# Patient Record
Sex: FEMALE | Race: BLACK OR AFRICAN AMERICAN | Employment: OTHER | URBAN - METROPOLITAN AREA
[De-identification: names, ages, dates, MRNs, and addresses within clinical notes are randomized per-mention and may not be internally consistent; named-entity substitution may affect disease eponyms.]

---

## 2017-01-17 ENCOUNTER — TELEPHONE (OUTPATIENT)
Dept: DERMATOLOGY | Age: 62
End: 2017-01-17

## 2017-01-23 ENCOUNTER — OFFICE VISIT (OUTPATIENT)
Dept: PRIMARY CARE CLINIC | Age: 62
End: 2017-01-23

## 2017-01-23 VITALS
SYSTOLIC BLOOD PRESSURE: 160 MMHG | OXYGEN SATURATION: 96 % | HEART RATE: 67 BPM | TEMPERATURE: 98 F | DIASTOLIC BLOOD PRESSURE: 80 MMHG | WEIGHT: 254.6 LBS | BODY MASS INDEX: 42.37 KG/M2

## 2017-01-23 DIAGNOSIS — K13.79 MOUTH PAIN: Primary | ICD-10-CM

## 2017-01-23 PROCEDURE — 99214 OFFICE O/P EST MOD 30 MIN: CPT | Performed by: FAMILY MEDICINE

## 2017-01-23 RX ORDER — CLINDAMYCIN HYDROCHLORIDE 150 MG/1
150 CAPSULE ORAL 4 TIMES DAILY
Qty: 40 CAPSULE | Refills: 0 | Status: SHIPPED | OUTPATIENT
Start: 2017-01-23 | End: 2017-02-02

## 2017-01-23 ASSESSMENT — ENCOUNTER SYMPTOMS
VOMITING: 0
SORE THROAT: 0
EYE PAIN: 0
BLOOD IN STOOL: 0
TROUBLE SWALLOWING: 0
SINUS PRESSURE: 0
CONSTIPATION: 0
RECTAL PAIN: 0
SHORTNESS OF BREATH: 0
PHOTOPHOBIA: 0
WHEEZING: 0
EYE REDNESS: 0
APNEA: 0
CHOKING: 0
EYE ITCHING: 0
DIARRHEA: 0
STRIDOR: 0
EYE DISCHARGE: 0
ABDOMINAL DISTENTION: 0
COLOR CHANGE: 0
COUGH: 0
CHEST TIGHTNESS: 0
RHINORRHEA: 0
BACK PAIN: 0
NAUSEA: 0

## 2017-01-28 RX ORDER — CANAGLIFLOZIN AND METFORMIN HYDROCHLORIDE 150; 500 MG/1; MG/1
TABLET, FILM COATED ORAL
Qty: 60 TABLET | Refills: 0 | Status: SHIPPED | OUTPATIENT
Start: 2017-01-28 | End: 2017-04-03 | Stop reason: SDUPTHER

## 2017-01-30 DIAGNOSIS — I10 ESSENTIAL HYPERTENSION: ICD-10-CM

## 2017-01-30 RX ORDER — VALSARTAN AND HYDROCHLOROTHIAZIDE 320; 25 MG/1; MG/1
TABLET, FILM COATED ORAL
Qty: 90 TABLET | Refills: 0 | Status: SHIPPED | OUTPATIENT
Start: 2017-01-30 | End: 2017-03-29 | Stop reason: SDUPTHER

## 2017-02-09 ENCOUNTER — OFFICE VISIT (OUTPATIENT)
Dept: DERMATOLOGY | Age: 62
End: 2017-02-09

## 2017-02-09 DIAGNOSIS — L71.9 ROSACEA: Primary | ICD-10-CM

## 2017-02-09 DIAGNOSIS — L30.0 NUMMULAR DERMATITIS: ICD-10-CM

## 2017-02-09 DIAGNOSIS — L82.1 DERMATOSIS PAPULOSA NIGRA: ICD-10-CM

## 2017-02-09 PROCEDURE — 99213 OFFICE O/P EST LOW 20 MIN: CPT | Performed by: DERMATOLOGY

## 2017-02-09 RX ORDER — MINOCYCLINE HYDROCHLORIDE 50 MG/1
CAPSULE ORAL
Qty: 60 CAPSULE | Refills: 2 | Status: SHIPPED | OUTPATIENT
Start: 2017-02-09 | End: 2018-05-03 | Stop reason: SDUPTHER

## 2017-02-09 RX ORDER — TACROLIMUS 1 MG/G
OINTMENT TOPICAL
Qty: 30 G | Refills: 3 | Status: SHIPPED | OUTPATIENT
Start: 2017-02-09 | End: 2019-07-23 | Stop reason: SDUPTHER

## 2017-02-21 RX ORDER — PANTOPRAZOLE SODIUM 40 MG/1
TABLET, DELAYED RELEASE ORAL
Qty: 90 TABLET | Refills: 0 | Status: SHIPPED | OUTPATIENT
Start: 2017-02-21 | End: 2017-06-10 | Stop reason: SDUPTHER

## 2017-03-06 RX ORDER — ATORVASTATIN CALCIUM 20 MG/1
TABLET, FILM COATED ORAL
Qty: 90 TABLET | Refills: 2 | Status: SHIPPED | OUTPATIENT
Start: 2017-03-06 | End: 2017-12-28 | Stop reason: SDUPTHER

## 2017-03-23 ENCOUNTER — OFFICE VISIT (OUTPATIENT)
Dept: PRIMARY CARE CLINIC | Age: 62
End: 2017-03-23

## 2017-03-23 VITALS
TEMPERATURE: 99.2 F | RESPIRATION RATE: 17 BRPM | HEART RATE: 67 BPM | SYSTOLIC BLOOD PRESSURE: 114 MMHG | DIASTOLIC BLOOD PRESSURE: 75 MMHG | WEIGHT: 259 LBS | HEIGHT: 64 IN | BODY MASS INDEX: 44.22 KG/M2

## 2017-03-23 DIAGNOSIS — J06.9 VIRAL URI WITH COUGH: Primary | ICD-10-CM

## 2017-03-23 PROCEDURE — 99213 OFFICE O/P EST LOW 20 MIN: CPT | Performed by: FAMILY MEDICINE

## 2017-03-23 ASSESSMENT — PATIENT HEALTH QUESTIONNAIRE - PHQ9
SUM OF ALL RESPONSES TO PHQ QUESTIONS 1-9: 0
1. LITTLE INTEREST OR PLEASURE IN DOING THINGS: 0
2. FEELING DOWN, DEPRESSED OR HOPELESS: 0
SUM OF ALL RESPONSES TO PHQ9 QUESTIONS 1 & 2: 0

## 2017-03-24 ENCOUNTER — TELEPHONE (OUTPATIENT)
Dept: PRIMARY CARE CLINIC | Age: 62
End: 2017-03-24

## 2017-03-27 ENCOUNTER — TELEPHONE (OUTPATIENT)
Dept: PRIMARY CARE CLINIC | Age: 62
End: 2017-03-27

## 2017-03-27 RX ORDER — AZITHROMYCIN 250 MG/1
TABLET, FILM COATED ORAL
Qty: 1 PACKET | Refills: 0 | Status: SHIPPED | OUTPATIENT
Start: 2017-03-27 | End: 2017-04-06

## 2017-04-05 RX ORDER — CALCIUM CITRATE/VITAMIN D3 200MG-6.25
TABLET ORAL
Qty: 100 STRIP | Refills: 0 | Status: SHIPPED | OUTPATIENT
Start: 2017-04-05 | End: 2017-05-11 | Stop reason: SDUPTHER

## 2017-06-22 ENCOUNTER — TELEPHONE (OUTPATIENT)
Dept: DERMATOLOGY | Age: 62
End: 2017-06-22

## 2017-06-23 RX ORDER — FLUOCINONIDE 0.5 MG/G
OINTMENT TOPICAL
Qty: 60 G | Refills: 1 | Status: SHIPPED | OUTPATIENT
Start: 2017-06-23 | End: 2017-06-30

## 2017-09-19 ENCOUNTER — TELEPHONE (OUTPATIENT)
Dept: PRIMARY CARE CLINIC | Age: 62
End: 2017-09-19

## 2017-09-27 ENCOUNTER — OFFICE VISIT (OUTPATIENT)
Dept: PRIMARY CARE CLINIC | Age: 62
End: 2017-09-27

## 2017-09-27 VITALS
OXYGEN SATURATION: 97 % | TEMPERATURE: 98.2 F | DIASTOLIC BLOOD PRESSURE: 70 MMHG | WEIGHT: 249 LBS | HEART RATE: 60 BPM | BODY MASS INDEX: 44.12 KG/M2 | HEIGHT: 63 IN | SYSTOLIC BLOOD PRESSURE: 110 MMHG

## 2017-09-27 DIAGNOSIS — Z96.643 S/P BILATERAL HIP REPLACEMENTS: ICD-10-CM

## 2017-09-27 DIAGNOSIS — E78.00 PURE HYPERCHOLESTEROLEMIA: ICD-10-CM

## 2017-09-27 DIAGNOSIS — E11.9 TYPE 2 DIABETES MELLITUS WITHOUT COMPLICATION, WITHOUT LONG-TERM CURRENT USE OF INSULIN (HCC): Primary | ICD-10-CM

## 2017-09-27 DIAGNOSIS — I10 ESSENTIAL HYPERTENSION: ICD-10-CM

## 2017-09-27 LAB — HBA1C MFR BLD: 8 %

## 2017-09-27 PROCEDURE — 90736 HZV VACCINE LIVE SUBQ: CPT | Performed by: FAMILY MEDICINE

## 2017-09-27 PROCEDURE — 90471 IMMUNIZATION ADMIN: CPT | Performed by: FAMILY MEDICINE

## 2017-09-27 PROCEDURE — 99214 OFFICE O/P EST MOD 30 MIN: CPT | Performed by: FAMILY MEDICINE

## 2017-09-27 PROCEDURE — 83036 HEMOGLOBIN GLYCOSYLATED A1C: CPT | Performed by: FAMILY MEDICINE

## 2017-09-27 RX ORDER — GLIMEPIRIDE 2 MG/1
2 TABLET ORAL EVERY MORNING
Qty: 90 TABLET | Refills: 3 | Status: SHIPPED | OUTPATIENT
Start: 2017-09-27 | End: 2017-09-27 | Stop reason: DRUGHIGH

## 2017-09-27 RX ORDER — GLIMEPIRIDE 4 MG/1
TABLET ORAL
Qty: 90 TABLET | Refills: 3 | Status: SHIPPED | OUTPATIENT
Start: 2017-09-27 | End: 2018-09-25 | Stop reason: SDUPTHER

## 2017-09-27 ASSESSMENT — ENCOUNTER SYMPTOMS
APNEA: 0
BACK PAIN: 0
PHOTOPHOBIA: 0
CONSTIPATION: 0
VOMITING: 0
CHOKING: 0
COLOR CHANGE: 0
RECTAL PAIN: 0
BLURRED VISION: 0
BLOOD IN STOOL: 0
EYE PAIN: 0
STRIDOR: 0
NAUSEA: 0
WHEEZING: 0
ORTHOPNEA: 0
RHINORRHEA: 0
SINUS PRESSURE: 0
DIARRHEA: 0
TROUBLE SWALLOWING: 0
EYE REDNESS: 0
VISUAL CHANGE: 0
EYE ITCHING: 0
CHEST TIGHTNESS: 0
SHORTNESS OF BREATH: 0
ABDOMINAL DISTENTION: 0
SORE THROAT: 0
EYE DISCHARGE: 0
COUGH: 0

## 2017-10-02 ENCOUNTER — TELEPHONE (OUTPATIENT)
Dept: PRIMARY CARE CLINIC | Age: 62
End: 2017-10-02

## 2017-10-02 NOTE — TELEPHONE ENCOUNTER
Pt was given the pneumonia shot last week. The injection site appears to have a rash on it. Some clear fluid. She wants to know what you recommend. Pls advise. Thank you!    PT JESUS: [phone]

## 2017-10-04 RX ORDER — MOMETASONE FUROATE 50 UG/1
SPRAY, METERED NASAL
Qty: 34 G | Refills: 5 | Status: SHIPPED | OUTPATIENT
Start: 2017-10-04

## 2017-10-04 RX ORDER — CLONIDINE HYDROCHLORIDE 0.2 MG/1
TABLET ORAL
Qty: 180 TABLET | Refills: 1 | Status: SHIPPED | OUTPATIENT
Start: 2017-10-04 | End: 2018-06-13 | Stop reason: SDUPTHER

## 2017-10-05 NOTE — TELEPHONE ENCOUNTER
Left message on voicemail for patient to call the office back.   To check on status of possible vaccine reaction

## 2017-10-06 ENCOUNTER — TELEPHONE (OUTPATIENT)
Dept: PRIMARY CARE CLINIC | Age: 62
End: 2017-10-06

## 2017-11-10 ENCOUNTER — OFFICE VISIT (OUTPATIENT)
Dept: PRIMARY CARE CLINIC | Age: 62
End: 2017-11-10

## 2017-11-10 VITALS
BODY MASS INDEX: 42.85 KG/M2 | SYSTOLIC BLOOD PRESSURE: 116 MMHG | WEIGHT: 251 LBS | HEART RATE: 60 BPM | DIASTOLIC BLOOD PRESSURE: 74 MMHG | HEIGHT: 64 IN

## 2017-11-10 DIAGNOSIS — Z78.0 POSTMENOPAUSE: ICD-10-CM

## 2017-11-10 DIAGNOSIS — Z00.00 ANNUAL PHYSICAL EXAM: Primary | ICD-10-CM

## 2017-11-10 DIAGNOSIS — E55.9 VITAMIN D DEFICIENCY: ICD-10-CM

## 2017-11-10 PROCEDURE — 99396 PREV VISIT EST AGE 40-64: CPT | Performed by: FAMILY MEDICINE

## 2017-11-10 NOTE — PROGRESS NOTES
Administered    Influenza, Intradermal, Preservative free 12/28/2012, 10/28/2016    Pneumococcal Polysaccharide ([id number]) 11/11/2016    Tdap (Boostrix, Adacel) 06/06/2016    Zoster 09/27/2017       Allergies   Allergen Reactions    Codeine Itching    Penicillins Itching     Current Outpatient Prescriptions   Medication Sig Dispense Refill    cloNIDine (CATAPRES) 0.2 MG tablet TAKE 1 TABLET BY MOUTH TWICE DAILY 180 tablet 1    mometasone (NASONEX) 50 MCG/ACT nasal spray SHAKE WELL AND USE 2 SPRAYS NASALLY DAILY 34 g 5    glimepiride (AMARYL) 4 MG tablet Take one tab a day and stop amaryl 2 mg 90 tablet 3    metoprolol tartrate (LOPRESSOR) 25 MG tablet TAKE 1 TABLET BY MOUTH TWICE DAILY 180 tablet 3    pantoprazole (PROTONIX) 40 MG tablet TAKE 1 TABLET BY MOUTH DAILY 90 tablet 2    amLODIPine (NORVASC) 5 MG tablet TAKE 1 TABLET BY MOUTH DAILY 90 tablet 2    INVOKAMET 150-500 MG TAKE 1 TABLET BY MOUTH TWICE DAILY WITH MEALS 60 tablet 5    valsartan-hydrochlorothiazide (DIOVAN-HCT) 320-25 MG per tablet TAKE 1 TABLET BY MOUTH DAILY 90 tablet 5    atorvastatin (LIPITOR) 20 MG tablet TAKE 1 TABLET BY MOUTH DAILY 90 tablet 2    minocycline (MINOCIN;DYNACIN) 50 MG capsule Take 1 by mouth twice daily for 2 to 4 weeks for rosacea flare ups. 60 capsule 2    tacrolimus (PROTOPIC) 0.1 % ointment Apply topically to the face 2 times daily if needed for rosacea.  30 g 3    Blood Glucose Monitoring Suppl BROCK Dispense one glucose monitor with supplies(any monitor covered by insurance  AIC 8.2  Test glucose twice a day 1 Device 0    Glucose Blood (BLOOD GLUCOSE TEST STRIPS) STRP Dispense glucose strips covered by insurance and compatible with her glucose monitor  AIC 8.2  Test glucose twice a day 200 strip 5    Lancets MISC Dispense any lancets covered by insurance  AIC 8.2  Test glucose twice a day 100 each 5    ibuprofen (ADVIL;MOTRIN) 800 MG tablet One tablet TID prn pain 120 tablet 3    clobetasol (TEMOVATE) 0.05 % ointment Apply to the itchy areas twice daily for 2 weeks or until improved. 30 g 1    ONETOUCH DELICA LANCETS 03M MISC USE TO TEST GLUCOSE TWICE DAILY 300 each 3    naproxen (NAPROSYN) 500 MG tablet Take 1 tablet by mouth 2 times daily as needed for Pain 30 tablet 0    fexofenadine (ALLEGRA) 180 MG tablet Take 1 tablet by mouth daily. 30 tablet 3    albuterol (PROVENTIL HFA) 108 (90 BASE) MCG/ACT inhaler Inhale 2 puffs into the lungs every 6 hours as needed for Wheezing for 365 doses. 1 Inhaler 3    aspirin 325 MG EC tablet Take 325 mg by mouth daily.  TRUE METRIX BLOOD GLUCOSE TEST strip FOLLOW PACKAGE DIRECTIONS 100 strip 5     No current facility-administered medications for this visit. Past Medical History:   Diagnosis Date    Diabetes mellitus ([address]     GERD (gastroesophageal reflux disease)     Hyperlipidemia     Hypertension     Obesity     S/P hip replacement      Past Surgical History:   Procedure Laterality Date    JOINT REPLACEMENT      TOTAL HIP ARTHROPLASTY Left Oct '13     Family History   Problem Relation Age of Onset    Heart Disease Mother      Social History     Social History    Marital status:      Spouse name: N/A    Number of children: N/A    Years of education: N/A     Occupational History    Not on file. Social History Main Topics    Smoking status: Never Smoker    Smokeless tobacco: Never Used    Alcohol use No    Drug use: No    Sexual activity: Not on file     Other Topics Concern    Not on file     Social History Narrative    No narrative on file       Review of Systems:  A comprehensive review of systems was negative except for what was noted in the HPI. Physical Exam:   Vitals:    11/10/17 1647   BP: 116/74   Pulse: 60   Weight: 251 lb (113.9 kg)   Height: 5' 4\" (1.626 m)     Body mass index is 43.08 kg/m². Constitutional: She is oriented to person, place, and time. She appears well-developed and well-nourished.

## 2017-12-18 ENCOUNTER — TELEPHONE (OUTPATIENT)
Dept: PRIMARY CARE CLINIC | Age: 62
End: 2017-12-18

## 2017-12-18 RX ORDER — ALBUTEROL SULFATE 90 UG/1
2 AEROSOL, METERED RESPIRATORY (INHALATION) EVERY 6 HOURS PRN
Qty: 1 INHALER | Refills: 3 | Status: SHIPPED | OUTPATIENT
Start: 2017-12-18 | End: 2021-04-19 | Stop reason: SDUPTHER

## 2017-12-28 RX ORDER — ATORVASTATIN CALCIUM 20 MG/1
TABLET, FILM COATED ORAL
Qty: 90 TABLET | Refills: 0 | Status: SHIPPED | OUTPATIENT
Start: 2017-12-28 | End: 2018-04-04 | Stop reason: SDUPTHER

## 2018-01-31 ENCOUNTER — OFFICE VISIT (OUTPATIENT)
Dept: PRIMARY CARE CLINIC | Age: 63
End: 2018-01-31

## 2018-01-31 VITALS — DIASTOLIC BLOOD PRESSURE: 80 MMHG | SYSTOLIC BLOOD PRESSURE: 120 MMHG

## 2018-01-31 DIAGNOSIS — R07.9 CHEST PAIN, UNSPECIFIED TYPE: ICD-10-CM

## 2018-01-31 DIAGNOSIS — Z82.49 FAMILY HISTORY OF HEART DISEASE: ICD-10-CM

## 2018-01-31 DIAGNOSIS — M17.0 PRIMARY OSTEOARTHRITIS OF BOTH KNEES: ICD-10-CM

## 2018-01-31 DIAGNOSIS — M16.0 BILATERAL HIP JOINT ARTHRITIS: ICD-10-CM

## 2018-01-31 DIAGNOSIS — R06.02 SOB (SHORTNESS OF BREATH): Primary | ICD-10-CM

## 2018-01-31 PROCEDURE — 99214 OFFICE O/P EST MOD 30 MIN: CPT | Performed by: FAMILY MEDICINE

## 2018-01-31 ASSESSMENT — ENCOUNTER SYMPTOMS
WHEEZING: 0
STRIDOR: 0
CONSTIPATION: 0
BACK PAIN: 0
NAUSEA: 0
CHOKING: 0
ABDOMINAL DISTENTION: 0
SHORTNESS OF BREATH: 1
CHEST TIGHTNESS: 0
VOMITING: 0
SINUS PRESSURE: 0
APNEA: 0
TROUBLE SWALLOWING: 0
COLOR CHANGE: 0
COUGH: 0
DIARRHEA: 0
EYE REDNESS: 0
RHINORRHEA: 0
EYE PAIN: 0
EYE ITCHING: 0
BLOOD IN STOOL: 0
SORE THROAT: 0
EYE DISCHARGE: 0
PHOTOPHOBIA: 0
RECTAL PAIN: 0

## 2018-01-31 NOTE — PROGRESS NOTES
is no rebound and no guarding. Musculoskeletal: Normal range of motion. She exhibits no edema or tenderness. Lymphadenopathy:     She has no cervical adenopathy. Neurological: She is alert and oriented to person, place, and time. She has normal reflexes. No cranial nerve deficit. Coordination normal.   Skin: Skin is warm and dry. No rash noted. She is not diaphoretic. No erythema. No pallor. Psychiatric: She has a normal mood and affect. Her behavior is normal. Judgment and thought content normal.   Nursing note and vitals reviewed. Assessment:      1. SOB (shortness of breath)    - Stress test, myoview; Future    2. Chest pain, unspecified type  Restart asa    - Stress test, myoview; Future  Should report to ER stat  If chest pain persists or worsens  3. Family history of heart disease    - Stress test, myoview; Future    4. Primary osteoarthritis of both knees    - Stress test, myoview; Future    5. Bilateral hip joint arthritis    - Stress test, myoview;  Future          Plan:

## 2018-02-05 ENCOUNTER — TELEPHONE (OUTPATIENT)
Dept: PRIMARY CARE CLINIC | Age: 63
End: 2018-02-05

## 2018-02-05 ENCOUNTER — HOSPITAL ENCOUNTER (OUTPATIENT)
Dept: NON INVASIVE DIAGNOSTICS | Age: 63
Discharge: HOME | End: 2018-02-05
Attending: FAMILY MEDICINE | Admitting: FAMILY MEDICINE

## 2018-02-05 DIAGNOSIS — R06.02 SHORTNESS OF BREATH: ICD-10-CM

## 2018-02-05 DIAGNOSIS — R94.39 ABNORMAL STRESS TEST: ICD-10-CM

## 2018-02-05 DIAGNOSIS — R07.9 CHEST PAIN, UNSPECIFIED TYPE: Primary | ICD-10-CM

## 2018-02-05 LAB
LV EF: 74 %
LVEF MODALITY: NORMAL

## 2018-02-06 ENCOUNTER — TELEPHONE (OUTPATIENT)
Dept: PRIMARY CARE CLINIC | Age: 63
End: 2018-02-06

## 2018-02-06 NOTE — TELEPHONE ENCOUNTER
Patient states she needs a letter for work. She states she has been off since 02/01/2018 and does not see the cardiologist until Friday. She would like the letter to stay open.   Please advise

## 2018-02-09 ENCOUNTER — OFFICE VISIT (OUTPATIENT)
Dept: CARDIOLOGY CLINIC | Age: 63
End: 2018-02-09

## 2018-02-09 VITALS
DIASTOLIC BLOOD PRESSURE: 58 MMHG | HEART RATE: 72 BPM | HEIGHT: 63 IN | SYSTOLIC BLOOD PRESSURE: 104 MMHG | BODY MASS INDEX: 45 KG/M2 | WEIGHT: 254 LBS

## 2018-02-09 DIAGNOSIS — Z79.01 LONG-TERM (CURRENT) USE OF ANTICOAGULANTS: ICD-10-CM

## 2018-02-09 DIAGNOSIS — R94.31 ABNORMAL EKG: Primary | ICD-10-CM

## 2018-02-09 DIAGNOSIS — I10 ESSENTIAL HYPERTENSION: ICD-10-CM

## 2018-02-09 DIAGNOSIS — E78.5 HYPERLIPIDEMIA, UNSPECIFIED HYPERLIPIDEMIA TYPE: ICD-10-CM

## 2018-02-09 LAB
A/G RATIO: 1.3 (ref 1.1–2.2)
ALBUMIN SERPL-MCNC: 4.2 G/DL (ref 3.4–5)
ALP BLD-CCNC: 71 U/L (ref 40–129)
ALT SERPL-CCNC: 18 U/L (ref 10–40)
ANION GAP SERPL CALCULATED.3IONS-SCNC: 14 MMOL/L (ref 3–16)
AST SERPL-CCNC: 23 U/L (ref 15–37)
BASOPHILS ABSOLUTE: 0.1 K/UL (ref 0–0.2)
BASOPHILS RELATIVE PERCENT: 0.7 %
BILIRUB SERPL-MCNC: 0.9 MG/DL (ref 0–1)
BUN BLDV-MCNC: 21 MG/DL (ref 7–20)
CALCIUM SERPL-MCNC: 9.8 MG/DL (ref 8.3–10.6)
CHLORIDE BLD-SCNC: 97 MMOL/L (ref 99–110)
CO2: 31 MMOL/L (ref 21–32)
CREAT SERPL-MCNC: 1.3 MG/DL (ref 0.6–1.2)
EOSINOPHILS ABSOLUTE: 0.3 K/UL (ref 0–0.6)
EOSINOPHILS RELATIVE PERCENT: 4.5 %
GFR AFRICAN AMERICAN: 50
GFR NON-AFRICAN AMERICAN: 41
GLOBULIN: 3.3 G/DL
GLUCOSE BLD-MCNC: 127 MG/DL (ref 70–99)
HCT VFR BLD CALC: 41.8 % (ref 36–48)
HEMOGLOBIN: 13.6 G/DL (ref 12–16)
INR BLD: 0.98 (ref 0.85–1.15)
LYMPHOCYTES ABSOLUTE: 1.4 K/UL (ref 1–5.1)
LYMPHOCYTES RELATIVE PERCENT: 20.4 %
MCH RBC QN AUTO: 29.4 PG (ref 26–34)
MCHC RBC AUTO-ENTMCNC: 32.7 G/DL (ref 31–36)
MCV RBC AUTO: 90 FL (ref 80–100)
MONOCYTES ABSOLUTE: 0.7 K/UL (ref 0–1.3)
MONOCYTES RELATIVE PERCENT: 9.9 %
NEUTROPHILS ABSOLUTE: 4.4 K/UL (ref 1.7–7.7)
NEUTROPHILS RELATIVE PERCENT: 64.5 %
PDW BLD-RTO: 15.5 % (ref 12.4–15.4)
PLATELET # BLD: 228 K/UL (ref 135–450)
PMV BLD AUTO: 10.4 FL (ref 5–10.5)
POTASSIUM SERPL-SCNC: 4.5 MMOL/L (ref 3.5–5.1)
PROTHROMBIN TIME: 11.1 SEC (ref 9.6–13)
RBC # BLD: 4.64 M/UL (ref 4–5.2)
SODIUM BLD-SCNC: 142 MMOL/L (ref 136–145)
TOTAL PROTEIN: 7.5 G/DL (ref 6.4–8.2)
WBC # BLD: 6.8 K/UL (ref 4–11)

## 2018-02-09 PROCEDURE — 99204 OFFICE O/P NEW MOD 45 MIN: CPT | Performed by: INTERNAL MEDICINE

## 2018-02-09 NOTE — PROGRESS NOTES
MD Maria De Jesus   clobetasol (TEMOVATE) 0.05 % ointment Apply to the itchy areas twice daily for 2 weeks or until improved. 3/30/16  Yes Tommie Denney MD   [facility] LANCETS 01Y MISC USE TO TEST GLUCOSE TWICE DAILY 6/23/15  Yes Gerard Contreras MD   naproxen (NAPROSYN) 500 MG tablet Take 1 tablet by mouth 2 times daily as needed for Pain 6/3/15  Yes Gerard Contreras MD   fexofenadine (ALLEGRA) 180 MG tablet Take 1 tablet by mouth daily. 10/6/14  Yes Gerard Contreras MD   aspirin 325 MG EC tablet Take 325 mg by mouth daily. Yes Historical Provider, MD           Allergies:  Codeine and Penicillins     Review of Systems:   · Constitutional: there has been no unanticipated weight loss. · Eyes: No visual changes or diplopia. No scleral icterus. · ENT: No Headaches, hearing loss or vertigo. No mouth sores or sore throat. · Cardiovascular: Reviewed in HPI  ·  Pulmonary:  no cough or sputum production. · Gastrointestinal: No abdominal pain, appetite loss, blood in stools. No change in bowel or bladder habits. · Genitourinary: No dysuria, trouble voiding, or hematuria. · Musculoskeletal:  No gait disturbance, weakness or joint complaints. · Integumentary: No rash or pruritis. Endocrine: No malaise, fatigue or temperature intolerance. Hematologic/Lymphatic: No abnormal bruising or bleeding, blood clots or swollen lymph nodes. · Allergic/Immunologic: No nasal congestion or hives. · All other ROS are reviewed and are unremarkable. Physical Examination:      Wt Readings from Last 3 Encounters:   02/09/18 254 lb (115.2 kg)   01/31/18 255 lb (115.7 kg)   01/30/18 255 lb (115.7 kg)       BP Readings from Last 3 Encounters:   02/09/18 (!) 104/58   01/31/18 120/80   01/31/18 126/76       Pulse Readings from Last 3 Encounters:   02/09/18 72   01/31/18 61   01/30/18 63       Constitutional and General Appearance:  Healthy. And alert . HEENT: eyes and ears intact.  No nasal masses  THYROID: not enlarged  LUNGS:  · Clear to auscultation and percussion  HEART and VASCULAR:  · The apical impulses not displaced  · Heart tones are crisp and normal  · Cervical veins are not engorged  · The carotid upstroke is normal in amplitude and contour without delay or bruit  · Peripheral pulses are symmetrical and full  · There is no clubbing, cyanosis of the extremities. · No peripheral edema  · Femoral Arteries: 2+ and equal  · Pedal Pulses: 2+ and equal   ABDOMEN[de-identified]  · No masses or tenderness  · Liver/Spleen: No Abnormalities Noted  NEUROLOGICAL:  . Moves all extremities to command. Cranial nerves 2-12 are in tact.   PSYCHIATRIC: alert and lucid  and oriented and appropriate  SKIN: No lesions or rashes  LYMPH NODES: none enlarged    ·   ·   ·     CBC with Differential:    Lab Results   Component Value Date    WBC 8.0 01/30/2018    RBC 4.51 01/30/2018    HGB 13.1 01/30/2018    HCT 40.5 01/30/2018     01/30/2018    MCV 89.8 01/30/2018    MCH 29.1 01/30/2018    MCHC 32.4 01/30/2018    RDW 15.7 01/30/2018    SEGSPCT 66 08/15/2012    LYMPHOPCT 19.3 01/30/2018    MONOPCT 11.5 01/30/2018    EOSPCT 4.3 02/16/2012    BASOPCT 0.9 01/30/2018    MONOSABS 0.9 01/30/2018    LYMPHSABS 1.5 01/30/2018    EOSABS 0.4 01/30/2018    BASOSABS 0.1 01/30/2018    DIFFTYPE Auto-K 02/16/2012     BMP:    Lab Results   Component Value Date     01/30/2018    K 3.8 01/30/2018     01/30/2018    CO2 32 01/30/2018    BUN 27 01/30/2018    LABALBU 4.3 06/03/2015    CREATININE 1.4 01/30/2018    CALCIUM 9.7 01/30/2018    GFRAA 46 01/30/2018    GFRAA >60 12/28/2012    LABGLOM 38 01/30/2018    LABGLOM 65 09/12/2012    LABGLOM 54 09/12/2012     Uric Acid:  No components found for: URIC  PT/INR:    Lab Results   Component Value Date    PROTIME 12.1 10/22/2013    INR 1.08 10/22/2013     Last 3 Troponin:    Lab Results   Component Value Date    TROPONINI <0.01 01/30/2018     FLP:    Lab Results   Component Value Date    TRIG 88 05/27/2016    HDL

## 2018-02-13 ENCOUNTER — HOSPITAL ENCOUNTER (OUTPATIENT)
Dept: CARDIAC CATH/INVASIVE PROCEDURES | Age: 63
Discharge: HOME | End: 2018-02-13
Attending: INTERNAL MEDICINE | Admitting: INTERNAL MEDICINE

## 2018-02-13 VITALS — HEIGHT: 63 IN | WEIGHT: 254 LBS | BODY MASS INDEX: 45 KG/M2

## 2018-02-13 LAB
ANION GAP SERPL CALCULATED.3IONS-SCNC: 11 MMOL/L (ref 3–16)
BUN BLDV-MCNC: 27 MG/DL (ref 7–20)
CALCIUM SERPL-MCNC: 9.5 MG/DL (ref 8.3–10.6)
CHLORIDE BLD-SCNC: 100 MMOL/L (ref 99–110)
CO2: 30 MMOL/L (ref 21–32)
CREAT SERPL-MCNC: 1.3 MG/DL (ref 0.6–1.2)
GFR AFRICAN AMERICAN: 50
GFR NON-AFRICAN AMERICAN: 41
GLUCOSE BLD-MCNC: 123 MG/DL (ref 70–99)
GLUCOSE BLD-MCNC: 92 MG/DL (ref 70–99)
PERFORMED ON: NORMAL
POTASSIUM SERPL-SCNC: 4.2 MMOL/L (ref 3.5–5.1)
SODIUM BLD-SCNC: 141 MMOL/L (ref 136–145)

## 2018-02-13 PROCEDURE — 99024 POSTOP FOLLOW-UP VISIT: CPT | Performed by: INTERNAL MEDICINE

## 2018-02-13 PROCEDURE — 93452 LEFT HRT CATH W/VENTRCLGRPHY: CPT | Performed by: INTERNAL MEDICINE

## 2018-02-13 RX ORDER — ONDANSETRON 2 MG/ML
4 INJECTION INTRAMUSCULAR; INTRAVENOUS EVERY 6 HOURS PRN
Status: DISCONTINUED | OUTPATIENT
Start: 2018-02-13 | End: 2018-02-14 | Stop reason: HOSPADM

## 2018-02-13 RX ORDER — ACETAMINOPHEN 325 MG/1
650 TABLET ORAL EVERY 4 HOURS PRN
Status: DISCONTINUED | OUTPATIENT
Start: 2018-02-13 | End: 2018-02-14 | Stop reason: HOSPADM

## 2018-02-13 RX ORDER — SODIUM CHLORIDE 9 MG/ML
INJECTION, SOLUTION INTRAVENOUS CONTINUOUS
Status: ACTIVE | OUTPATIENT
Start: 2018-02-13 | End: 2018-02-13

## 2018-02-14 ENCOUNTER — TELEPHONE (OUTPATIENT)
Dept: DERMATOLOGY | Age: 63
End: 2018-02-14

## 2018-02-14 NOTE — TELEPHONE ENCOUNTER
Patient called and her leg is not getting any better. It is very itcy.      Call back # [phone]

## 2018-02-16 ENCOUNTER — TELEPHONE (OUTPATIENT)
Dept: CARDIOLOGY CLINIC | Age: 63
End: 2018-02-16

## 2018-03-05 ENCOUNTER — OFFICE VISIT (OUTPATIENT)
Dept: CARDIOLOGY CLINIC | Age: 63
End: 2018-03-05

## 2018-03-05 VITALS
BODY MASS INDEX: 46.94 KG/M2 | DIASTOLIC BLOOD PRESSURE: 84 MMHG | WEIGHT: 260.8 LBS | HEART RATE: 68 BPM | SYSTOLIC BLOOD PRESSURE: 130 MMHG

## 2018-03-05 DIAGNOSIS — E78.5 HYPERLIPIDEMIA, UNSPECIFIED HYPERLIPIDEMIA TYPE: Primary | ICD-10-CM

## 2018-03-05 PROCEDURE — 99213 OFFICE O/P EST LOW 20 MIN: CPT | Performed by: INTERNAL MEDICINE

## 2018-03-05 RX ORDER — AMLODIPINE BESYLATE 5 MG/1
TABLET ORAL
Qty: 90 TABLET | Refills: 0 | Status: SHIPPED | OUTPATIENT
Start: 2018-03-05 | End: 2018-06-13 | Stop reason: SDUPTHER

## 2018-03-05 NOTE — PROGRESS NOTES
impulses not displaced  Heart tones are crisp and normal  Cervical veins are not engorged  The carotid upstroke is normal in amplitude and contour without delay or bruit  JVP is not elevated  ABDOMEN:  Normal bowel sounds, non-distended and non-tender to palpation  EXT: No edema, no calf tenderness. Pulses are present bilaterally. DATA:    Lab Results   Component Value Date    ALT 18 02/09/2018    AST 23 02/09/2018    ALKPHOS 71 02/09/2018    BILITOT 0.9 02/09/2018     Lab Results   Component Value Date    CREATININE 1.3 (H) 02/13/2018    BUN 27 (H) 02/13/2018     02/13/2018    K 4.2 02/13/2018     02/13/2018    CO2 30 02/13/2018     Lab Results   Component Value Date    TSH 1.34 12/28/2012     Lab Results   Component Value Date    WBC 6.8 02/09/2018    HGB 13.6 02/09/2018    HCT 41.8 02/09/2018    MCV 90.0 02/09/2018     02/09/2018     No components found for: CHLPL  Lab Results   Component Value Date    TRIG 88 05/27/2016    TRIG 98 06/03/2015    TRIG 109 08/15/2012     Lab Results   Component Value Date    HDL 43 05/27/2016    HDL 44 06/03/2015    HDL 42 08/15/2012     Lab Results   Component Value Date    LDLCALC 97 05/27/2016    LDLCALC 98 06/03/2015    LDLCALC 114 08/15/2012     Lab Results   Component Value Date    LABVLDL 18 05/27/2016    LABVLDL 20 06/03/2015    LABVLDL 21 01/30/2012     Radiology Review:  Pertinent images / reports were reviewed as a part of this visit and reveals the following:    Last Echo:    Last Stress Test / Angiogram:  Results:  Left ventricular pressure 14 mmHg 2/13/18  Aortic pressure 136 mmHg     Coronary anatomy:   The left main coronary artery is normal.      Left anterior descending artery is normal     Circumflex artery is normal.     The right coronary artery is a dominant vessel and normal.      Left ventriculogram shows ejection fraction of 55%. Normal wall motion.   All coronary arteries were large vessels without compromise.     Impression:  No evidence   coronary artery disease  False positiNormal sinus rhythm  1/30/18  Possible Left atrial enlargement   Borderline ECG  stress test  Last ECG:  This patient was educated using the patient point room wall mount device. Absence from smokers and smoking and diet and exercising are important. Assessment:    Chest pains that were atypical with a false positive stress nuclear study. Cardiac cath was negative with normal coronaries. Plan:   Continue current therapy. Follow with Dr. Quinton Villavicencio. We will be happy to see her in the future as necessary. Please call if we can assist further [phone]. Marcus Pang.  Supriya PALM, [facility] [place]      This note was likely completed using voice recognition technology and may contain unintended errors

## 2018-04-04 DIAGNOSIS — K21.9 GASTROESOPHAGEAL REFLUX DISEASE WITHOUT ESOPHAGITIS: Primary | ICD-10-CM

## 2018-04-04 RX ORDER — PANTOPRAZOLE SODIUM 40 MG/1
TABLET, DELAYED RELEASE ORAL
Qty: 90 TABLET | Refills: 0 | Status: SHIPPED | OUTPATIENT
Start: 2018-04-04 | End: 2018-07-19 | Stop reason: SDUPTHER

## 2018-04-30 ENCOUNTER — TELEPHONE (OUTPATIENT)
Dept: DERMATOLOGY | Age: 63
End: 2018-04-30

## 2018-04-30 ENCOUNTER — TELEPHONE (OUTPATIENT)
Dept: PRIMARY CARE CLINIC | Age: 63
End: 2018-04-30

## 2018-05-03 ENCOUNTER — OFFICE VISIT (OUTPATIENT)
Dept: DERMATOLOGY | Age: 63
End: 2018-05-03

## 2018-05-03 DIAGNOSIS — L30.9 CHRONIC DERMATITIS: ICD-10-CM

## 2018-05-03 DIAGNOSIS — L71.9 ROSACEA: Primary | ICD-10-CM

## 2018-05-03 PROCEDURE — 99213 OFFICE O/P EST LOW 20 MIN: CPT | Performed by: DERMATOLOGY

## 2018-05-03 RX ORDER — MINOCYCLINE HYDROCHLORIDE 50 MG/1
CAPSULE ORAL
Qty: 60 CAPSULE | Refills: 2 | Status: SHIPPED | OUTPATIENT
Start: 2018-05-03 | End: 2019-05-11 | Stop reason: SDUPTHER

## 2018-05-03 RX ORDER — CLOBETASOL PROPIONATE 0.5 MG/G
OINTMENT TOPICAL
Qty: 45 G | Refills: 2 | Status: SHIPPED | OUTPATIENT
Start: 2018-05-03

## 2018-05-15 RX ORDER — CANAGLIFLOZIN AND METFORMIN HYDROCHLORIDE 150; 500 MG/1; MG/1
TABLET, FILM COATED ORAL
Qty: 60 TABLET | Refills: 3 | Status: SHIPPED | OUTPATIENT
Start: 2018-05-15 | End: 2018-09-25 | Stop reason: SDUPTHER

## 2018-06-13 DIAGNOSIS — I10 ESSENTIAL HYPERTENSION: ICD-10-CM

## 2018-06-13 RX ORDER — AMLODIPINE BESYLATE 5 MG/1
TABLET ORAL
Qty: 90 TABLET | Refills: 0 | Status: SHIPPED | OUTPATIENT
Start: 2018-06-13 | End: 2018-09-25 | Stop reason: SDUPTHER

## 2018-06-13 RX ORDER — VALSARTAN AND HYDROCHLOROTHIAZIDE 320; 25 MG/1; MG/1
1 TABLET, FILM COATED ORAL DAILY
Qty: 90 TABLET | Refills: 0 | Status: SHIPPED | OUTPATIENT
Start: 2018-06-13 | End: 2018-09-25 | Stop reason: SDUPTHER

## 2018-07-19 DIAGNOSIS — K21.9 GASTROESOPHAGEAL REFLUX DISEASE WITHOUT ESOPHAGITIS: ICD-10-CM

## 2018-07-20 RX ORDER — PANTOPRAZOLE SODIUM 40 MG/1
TABLET, DELAYED RELEASE ORAL
Qty: 90 TABLET | Refills: 0 | Status: SHIPPED | OUTPATIENT
Start: 2018-07-20 | End: 2018-09-25 | Stop reason: SDUPTHER

## 2018-07-20 NOTE — TELEPHONE ENCOUNTER
Requested Prescriptions     Pending Prescriptions Disp Refills    pantoprazole (PROTONIX) 40 MG tablet [Pharmacy Med Name: PANTOPRAZOLE 40MG TABLETS] 90 tablet 0     Sig: TAKE 1 TABLET BY MOUTH DAILY   .   Last ov      2/14/18   Future OV  none

## 2018-08-03 ENCOUNTER — TELEPHONE (OUTPATIENT)
Dept: PRIMARY CARE CLINIC | Age: 63
End: 2018-08-03

## 2018-08-03 DIAGNOSIS — B37.9 YEAST INFECTION: Primary | ICD-10-CM

## 2018-08-03 RX ORDER — FLUCONAZOLE 150 MG/1
150 TABLET ORAL ONCE
Qty: 1 TABLET | Refills: 0 | Status: SHIPPED | OUTPATIENT
Start: 2018-08-03 | End: 2018-08-03

## 2018-09-25 ENCOUNTER — OFFICE VISIT (OUTPATIENT)
Dept: PRIMARY CARE CLINIC | Age: 63
End: 2018-09-25
Payer: COMMERCIAL

## 2018-09-25 VITALS
HEART RATE: 72 BPM | HEIGHT: 64 IN | BODY MASS INDEX: 42.85 KG/M2 | WEIGHT: 251 LBS | SYSTOLIC BLOOD PRESSURE: 128 MMHG | OXYGEN SATURATION: 95 % | TEMPERATURE: 98.4 F | DIASTOLIC BLOOD PRESSURE: 76 MMHG

## 2018-09-25 DIAGNOSIS — Z12.31 ENCOUNTER FOR SCREENING MAMMOGRAM FOR BREAST CANCER: ICD-10-CM

## 2018-09-25 DIAGNOSIS — I10 ESSENTIAL HYPERTENSION: ICD-10-CM

## 2018-09-25 DIAGNOSIS — E11.9 TYPE 2 DIABETES MELLITUS WITHOUT COMPLICATION, WITHOUT LONG-TERM CURRENT USE OF INSULIN (HCC): Primary | ICD-10-CM

## 2018-09-25 DIAGNOSIS — Z23 NEED FOR PROPHYLACTIC VACCINATION AND INOCULATION AGAINST VARICELLA: ICD-10-CM

## 2018-09-25 DIAGNOSIS — K21.9 GASTROESOPHAGEAL REFLUX DISEASE WITHOUT ESOPHAGITIS: ICD-10-CM

## 2018-09-25 DIAGNOSIS — E66.01 MORBID OBESITY (HCC): ICD-10-CM

## 2018-09-25 DIAGNOSIS — Z13.220 SCREENING FOR HYPERLIPIDEMIA: ICD-10-CM

## 2018-09-25 LAB — HBA1C MFR BLD: 7.5 %

## 2018-09-25 PROCEDURE — 83036 HEMOGLOBIN GLYCOSYLATED A1C: CPT | Performed by: FAMILY MEDICINE

## 2018-09-25 PROCEDURE — 99214 OFFICE O/P EST MOD 30 MIN: CPT | Performed by: FAMILY MEDICINE

## 2018-09-25 RX ORDER — PANTOPRAZOLE SODIUM 40 MG/1
TABLET, DELAYED RELEASE ORAL
Qty: 90 TABLET | Refills: 1 | Status: SHIPPED | OUTPATIENT
Start: 2018-09-25 | End: 2018-12-24

## 2018-09-25 RX ORDER — GLIMEPIRIDE 4 MG/1
TABLET ORAL
Qty: 90 TABLET | Refills: 1 | Status: SHIPPED | OUTPATIENT
Start: 2018-09-25 | End: 2018-12-24

## 2018-09-25 RX ORDER — AMLODIPINE BESYLATE 5 MG/1
TABLET ORAL
Qty: 90 TABLET | Refills: 0 | Status: SHIPPED | OUTPATIENT
Start: 2018-09-25 | End: 2018-12-23 | Stop reason: SDUPTHER

## 2018-09-25 RX ORDER — ATORVASTATIN CALCIUM 20 MG/1
TABLET, FILM COATED ORAL
Qty: 90 TABLET | Refills: 1 | Status: SHIPPED | OUTPATIENT
Start: 2018-09-25 | End: 2019-07-05 | Stop reason: SDUPTHER

## 2018-09-25 RX ORDER — VALSARTAN AND HYDROCHLOROTHIAZIDE 320; 25 MG/1; MG/1
1 TABLET, FILM COATED ORAL DAILY
Qty: 90 TABLET | Refills: 1 | Status: SHIPPED | OUTPATIENT
Start: 2018-09-25 | End: 2019-05-08 | Stop reason: SDUPTHER

## 2018-09-25 ASSESSMENT — ENCOUNTER SYMPTOMS
EYE ITCHING: 0
WHEEZING: 0
NAUSEA: 0
PHOTOPHOBIA: 0
CHOKING: 0
RECTAL PAIN: 0
BLURRED VISION: 0
EYE REDNESS: 0
APNEA: 0
VOMITING: 0
DIARRHEA: 0
STRIDOR: 0
COLOR CHANGE: 0
VISUAL CHANGE: 0
WATER BRASH: 0
CHEST TIGHTNESS: 0
RHINORRHEA: 0
ABDOMINAL PAIN: 0
HOARSE VOICE: 0
SHORTNESS OF BREATH: 0
GLOBUS SENSATION: 0
COUGH: 0
SINUS PRESSURE: 0
EYE DISCHARGE: 0
CONSTIPATION: 0
TROUBLE SWALLOWING: 0
ABDOMINAL DISTENTION: 0
ORTHOPNEA: 0
BELCHING: 0
BLOOD IN STOOL: 0
HEARTBURN: 0
BACK PAIN: 0
EYE PAIN: 0
SORE THROAT: 0

## 2018-09-25 ASSESSMENT — PATIENT HEALTH QUESTIONNAIRE - PHQ9
SUM OF ALL RESPONSES TO PHQ9 QUESTIONS 1 & 2: 0
1. LITTLE INTEREST OR PLEASURE IN DOING THINGS: 0
2. FEELING DOWN, DEPRESSED OR HOPELESS: 0
SUM OF ALL RESPONSES TO PHQ QUESTIONS 1-9: 0
SUM OF ALL RESPONSES TO PHQ QUESTIONS 1-9: 0

## 2018-10-01 DIAGNOSIS — I10 HYPERTENSION: ICD-10-CM

## 2018-10-19 RX ORDER — CLONIDINE HYDROCHLORIDE 0.2 MG/1
TABLET ORAL
Qty: 180 TABLET | Refills: 0 | Status: SHIPPED | OUTPATIENT
Start: 2018-10-19 | End: 2019-01-21 | Stop reason: SDUPTHER

## 2018-12-23 DIAGNOSIS — I10 ESSENTIAL HYPERTENSION: ICD-10-CM

## 2018-12-23 DIAGNOSIS — K21.9 GASTROESOPHAGEAL REFLUX DISEASE WITHOUT ESOPHAGITIS: ICD-10-CM

## 2018-12-23 DIAGNOSIS — E11.9 TYPE 2 DIABETES MELLITUS WITHOUT COMPLICATION, WITHOUT LONG-TERM CURRENT USE OF INSULIN (HCC): ICD-10-CM

## 2018-12-24 RX ORDER — AMLODIPINE BESYLATE 5 MG/1
TABLET ORAL
Qty: 90 TABLET | Refills: 0 | Status: SHIPPED | OUTPATIENT
Start: 2018-12-24 | End: 2019-03-31 | Stop reason: SDUPTHER

## 2018-12-24 RX ORDER — PANTOPRAZOLE SODIUM 40 MG/1
TABLET, DELAYED RELEASE ORAL
Qty: 90 TABLET | Refills: 0 | Status: SHIPPED | OUTPATIENT
Start: 2018-12-24 | End: 2019-06-07 | Stop reason: SDUPTHER

## 2018-12-24 RX ORDER — GLIMEPIRIDE 2 MG/1
2 TABLET ORAL EVERY MORNING
Qty: 90 TABLET | Refills: 0 | Status: SHIPPED | OUTPATIENT
Start: 2018-12-24 | End: 2019-03-23 | Stop reason: SDUPTHER

## 2018-12-27 ENCOUNTER — OFFICE VISIT (OUTPATIENT)
Dept: SLEEP MEDICINE | Age: 63
End: 2018-12-27
Payer: COMMERCIAL

## 2018-12-27 VITALS
TEMPERATURE: 98.1 F | HEART RATE: 74 BPM | BODY MASS INDEX: 42.68 KG/M2 | DIASTOLIC BLOOD PRESSURE: 80 MMHG | WEIGHT: 250 LBS | SYSTOLIC BLOOD PRESSURE: 130 MMHG | HEIGHT: 64 IN | OXYGEN SATURATION: 97 % | RESPIRATION RATE: 18 BRPM

## 2018-12-27 DIAGNOSIS — Z99.89 OSA ON CPAP: Primary | ICD-10-CM

## 2018-12-27 DIAGNOSIS — G47.33 OSA ON CPAP: Primary | ICD-10-CM

## 2018-12-27 DIAGNOSIS — Z99.89 DEPENDENCE ON OTHER ENABLING MACHINES AND DEVICES: ICD-10-CM

## 2018-12-27 PROCEDURE — 99203 OFFICE O/P NEW LOW 30 MIN: CPT | Performed by: PSYCHIATRY & NEUROLOGY

## 2018-12-27 ASSESSMENT — SLEEP AND FATIGUE QUESTIONNAIRES
ESS TOTAL SCORE: 1
HOW LIKELY ARE YOU TO NOD OFF OR FALL ASLEEP WHILE SITTING AND READING: 0
HOW LIKELY ARE YOU TO NOD OFF OR FALL ASLEEP IN A CAR, WHILE STOPPED FOR A FEW MINUTES IN TRAFFIC: 0
HOW LIKELY ARE YOU TO NOD OFF OR FALL ASLEEP WHILE SITTING INACTIVE IN A PUBLIC PLACE: 0
HOW LIKELY ARE YOU TO NOD OFF OR FALL ASLEEP WHILE LYING DOWN TO REST IN THE AFTERNOON WHEN CIRCUMSTANCES PERMIT: 0
NECK CIRCUMFERENCE (INCHES): 16
HOW LIKELY ARE YOU TO NOD OFF OR FALL ASLEEP WHILE WATCHING TV: 1
HOW LIKELY ARE YOU TO NOD OFF OR FALL ASLEEP WHILE SITTING AND TALKING TO SOMEONE: 0
HOW LIKELY ARE YOU TO NOD OFF OR FALL ASLEEP WHILE SITTING QUIETLY AFTER LUNCH WITHOUT ALCOHOL: 0
HOW LIKELY ARE YOU TO NOD OFF OR FALL ASLEEP WHEN YOU ARE A PASSENGER IN A CAR FOR AN HOUR WITHOUT A BREAK: 0

## 2018-12-27 NOTE — PROGRESS NOTES
basis.    I educated to use the CPAP every night more than 4 hours at least.  Was given a new mask to use for now till she can get all her supplies through her insurance. Will download the data in 3 months, patient had never have a problem with CPAP therefore most likely will use it on nightly basis now with new mask. No orders of the defined types were placed in this encounter. Return in about 3 months (around 3/27/2019) for Reveiwing CPAP usage and compliance report and tro.     Jennifer Camacho MD  Medical Director - [facility]

## 2019-01-22 RX ORDER — CLONIDINE HYDROCHLORIDE 0.2 MG/1
TABLET ORAL
Qty: 180 TABLET | Refills: 0 | Status: SHIPPED | OUTPATIENT
Start: 2019-01-22 | End: 2019-03-31 | Stop reason: SDUPTHER

## 2019-01-29 ENCOUNTER — OFFICE VISIT (OUTPATIENT)
Dept: PRIMARY CARE CLINIC | Age: 64
End: 2019-01-29
Payer: COMMERCIAL

## 2019-01-29 VITALS
BODY MASS INDEX: 42.55 KG/M2 | HEART RATE: 65 BPM | HEIGHT: 64 IN | TEMPERATURE: 98.4 F | SYSTOLIC BLOOD PRESSURE: 98 MMHG | OXYGEN SATURATION: 95 % | WEIGHT: 249.2 LBS | DIASTOLIC BLOOD PRESSURE: 66 MMHG

## 2019-01-29 DIAGNOSIS — R82.90 URINE ABNORMALITY: ICD-10-CM

## 2019-01-29 DIAGNOSIS — E11.9 TYPE 2 DIABETES MELLITUS WITHOUT COMPLICATION, WITHOUT LONG-TERM CURRENT USE OF INSULIN (HCC): ICD-10-CM

## 2019-01-29 DIAGNOSIS — N39.0 URINARY TRACT INFECTION WITHOUT HEMATURIA, SITE UNSPECIFIED: Primary | ICD-10-CM

## 2019-01-29 LAB
BACTERIA: ABNORMAL /HPF
BILIRUBIN URINE: NEGATIVE
BILIRUBIN, POC: ABNORMAL
BLOOD URINE, POC: ABNORMAL
BLOOD, URINE: NEGATIVE
CLARITY, POC: ABNORMAL
CLARITY: ABNORMAL
COLOR, POC: YELLOW
COLOR: YELLOW
EPITHELIAL CELLS, UA: 1 /HPF (ref 0–5)
GLUCOSE URINE, POC: ABNORMAL
GLUCOSE URINE: >=1000 MG/DL
HBA1C MFR BLD: 8.1 %
HYALINE CASTS: 0 /LPF (ref 0–8)
KETONES, POC: ABNORMAL
KETONES, URINE: NEGATIVE MG/DL
LEUKOCYTE EST, POC: ABNORMAL
LEUKOCYTE ESTERASE, URINE: NEGATIVE
MICROSCOPIC EXAMINATION: YES
NITRITE, POC: ABNORMAL
NITRITE, URINE: NEGATIVE
PH UA: 5.5
PH, POC: 5.5
PROTEIN UA: NEGATIVE MG/DL
PROTEIN, POC: ABNORMAL
RBC UA: 2 /HPF (ref 0–4)
SPECIFIC GRAVITY UA: 1.03
SPECIFIC GRAVITY, POC: 1.01
URINE TYPE: ABNORMAL
UROBILINOGEN, POC: 0.2
UROBILINOGEN, URINE: 0.2 E.U./DL
WBC UA: 16 /HPF (ref 0–5)

## 2019-01-29 PROCEDURE — 83036 HEMOGLOBIN GLYCOSYLATED A1C: CPT | Performed by: FAMILY MEDICINE

## 2019-01-29 PROCEDURE — 99214 OFFICE O/P EST MOD 30 MIN: CPT | Performed by: FAMILY MEDICINE

## 2019-01-29 PROCEDURE — 81002 URINALYSIS NONAUTO W/O SCOPE: CPT | Performed by: FAMILY MEDICINE

## 2019-01-29 RX ORDER — SULFAMETHOXAZOLE AND TRIMETHOPRIM 800; 160 MG/1; MG/1
1 TABLET ORAL 2 TIMES DAILY
Qty: 14 TABLET | Refills: 0 | Status: SHIPPED | OUTPATIENT
Start: 2019-01-29 | End: 2019-02-05

## 2019-01-29 ASSESSMENT — ENCOUNTER SYMPTOMS
RECTAL PAIN: 0
VOMITING: 0
CONSTIPATION: 0
APNEA: 0
COUGH: 0
WHEEZING: 0
PHOTOPHOBIA: 0
EYE REDNESS: 0
EYE ITCHING: 0
TROUBLE SWALLOWING: 0
EYE DISCHARGE: 0
SINUS PRESSURE: 0
ABDOMINAL DISTENTION: 0
STRIDOR: 0
CHOKING: 0
CHEST TIGHTNESS: 0
RHINORRHEA: 0
EYE PAIN: 0
COLOR CHANGE: 0
NAUSEA: 1
BACK PAIN: 0
DIARRHEA: 0
SORE THROAT: 0
SHORTNESS OF BREATH: 0
BLOOD IN STOOL: 0

## 2019-01-31 LAB
ORGANISM: ABNORMAL
URINE CULTURE, ROUTINE: ABNORMAL
URINE CULTURE, ROUTINE: ABNORMAL

## 2019-03-11 ENCOUNTER — TELEPHONE (OUTPATIENT)
Dept: PRIMARY CARE CLINIC | Age: 64
End: 2019-03-11

## 2019-03-11 DIAGNOSIS — E11.9 TYPE 2 DIABETES MELLITUS WITHOUT COMPLICATION, WITHOUT LONG-TERM CURRENT USE OF INSULIN (HCC): ICD-10-CM

## 2019-03-23 RX ORDER — GLIMEPIRIDE 2 MG/1
2 TABLET ORAL EVERY MORNING
Qty: 90 TABLET | Refills: 0 | Status: SHIPPED | OUTPATIENT
Start: 2019-03-23 | End: 2019-07-05 | Stop reason: SDUPTHER

## 2019-03-25 ENCOUNTER — OFFICE VISIT (OUTPATIENT)
Dept: SLEEP MEDICINE | Age: 64
End: 2019-03-25
Payer: COMMERCIAL

## 2019-03-25 VITALS
OXYGEN SATURATION: 95 % | HEIGHT: 66 IN | TEMPERATURE: 98.6 F | RESPIRATION RATE: 16 BRPM | BODY MASS INDEX: 40.98 KG/M2 | WEIGHT: 255 LBS | HEART RATE: 78 BPM | SYSTOLIC BLOOD PRESSURE: 120 MMHG | DIASTOLIC BLOOD PRESSURE: 73 MMHG

## 2019-03-25 DIAGNOSIS — Z99.89 OSA ON CPAP: Primary | ICD-10-CM

## 2019-03-25 DIAGNOSIS — Z99.89 DEPENDENCE ON OTHER ENABLING MACHINES AND DEVICES: ICD-10-CM

## 2019-03-25 DIAGNOSIS — G47.33 OSA ON CPAP: Primary | ICD-10-CM

## 2019-03-25 PROCEDURE — 99213 OFFICE O/P EST LOW 20 MIN: CPT | Performed by: PSYCHIATRY & NEUROLOGY

## 2019-03-25 ASSESSMENT — SLEEP AND FATIGUE QUESTIONNAIRES
HOW LIKELY ARE YOU TO NOD OFF OR FALL ASLEEP WHILE LYING DOWN TO REST IN THE AFTERNOON WHEN CIRCUMSTANCES PERMIT: 1
HOW LIKELY ARE YOU TO NOD OFF OR FALL ASLEEP WHEN YOU ARE A PASSENGER IN A CAR FOR AN HOUR WITHOUT A BREAK: 0
HOW LIKELY ARE YOU TO NOD OFF OR FALL ASLEEP WHILE SITTING QUIETLY AFTER LUNCH WITHOUT ALCOHOL: 0
HOW LIKELY ARE YOU TO NOD OFF OR FALL ASLEEP WHILE SITTING AND TALKING TO SOMEONE: 0
HOW LIKELY ARE YOU TO NOD OFF OR FALL ASLEEP WHILE SITTING AND READING: 2
HOW LIKELY ARE YOU TO NOD OFF OR FALL ASLEEP WHILE WATCHING TV: 0
HOW LIKELY ARE YOU TO NOD OFF OR FALL ASLEEP WHILE SITTING INACTIVE IN A PUBLIC PLACE: 0
HOW LIKELY ARE YOU TO NOD OFF OR FALL ASLEEP IN A CAR, WHILE STOPPED FOR A FEW MINUTES IN TRAFFIC: 0
ESS TOTAL SCORE: 3

## 2019-03-25 ASSESSMENT — ENCOUNTER SYMPTOMS
CHOKING: 0
APNEA: 0

## 2019-03-31 DIAGNOSIS — I10 ESSENTIAL HYPERTENSION: ICD-10-CM

## 2019-04-05 RX ORDER — CLONIDINE HYDROCHLORIDE 0.2 MG/1
TABLET ORAL
Qty: 180 TABLET | Refills: 0 | Status: SHIPPED | OUTPATIENT
Start: 2019-04-05 | End: 2019-06-12 | Stop reason: SDUPTHER

## 2019-04-05 RX ORDER — AMLODIPINE BESYLATE 5 MG/1
TABLET ORAL
Qty: 90 TABLET | Refills: 0 | Status: SHIPPED | OUTPATIENT
Start: 2019-04-05 | End: 2019-07-21 | Stop reason: SDUPTHER

## 2019-05-08 DIAGNOSIS — I10 ESSENTIAL HYPERTENSION: ICD-10-CM

## 2019-05-09 RX ORDER — VALSARTAN AND HYDROCHLOROTHIAZIDE 320; 25 MG/1; MG/1
1 TABLET, FILM COATED ORAL DAILY
Qty: 90 TABLET | Refills: 0 | Status: SHIPPED | OUTPATIENT
Start: 2019-05-09 | End: 2019-07-21 | Stop reason: SDUPTHER

## 2019-05-13 RX ORDER — MINOCYCLINE HYDROCHLORIDE 50 MG/1
CAPSULE ORAL
Qty: 60 CAPSULE | Refills: 0 | Status: SHIPPED | OUTPATIENT
Start: 2019-05-13 | End: 2019-07-23 | Stop reason: SDUPTHER

## 2019-05-30 ENCOUNTER — TELEPHONE (OUTPATIENT)
Dept: DERMATOLOGY | Age: 64
End: 2019-05-30

## 2019-06-07 DIAGNOSIS — K21.9 GASTROESOPHAGEAL REFLUX DISEASE WITHOUT ESOPHAGITIS: ICD-10-CM

## 2019-06-10 RX ORDER — PANTOPRAZOLE SODIUM 40 MG/1
TABLET, DELAYED RELEASE ORAL
Qty: 90 TABLET | Refills: 0 | Status: SHIPPED | OUTPATIENT
Start: 2019-06-10 | End: 2019-09-11 | Stop reason: SDUPTHER

## 2019-06-12 DIAGNOSIS — I10 HYPERTENSION: ICD-10-CM

## 2019-06-14 RX ORDER — CLONIDINE HYDROCHLORIDE 0.2 MG/1
TABLET ORAL
Qty: 180 TABLET | Refills: 0 | Status: SHIPPED | OUTPATIENT
Start: 2019-06-14 | End: 2019-09-12 | Stop reason: SDUPTHER

## 2019-07-05 DIAGNOSIS — E11.9 TYPE 2 DIABETES MELLITUS WITHOUT COMPLICATION, WITHOUT LONG-TERM CURRENT USE OF INSULIN (HCC): ICD-10-CM

## 2019-07-08 RX ORDER — ATORVASTATIN CALCIUM 20 MG/1
TABLET, FILM COATED ORAL
Qty: 90 TABLET | Refills: 0 | Status: SHIPPED | OUTPATIENT
Start: 2019-07-08 | End: 2019-07-09 | Stop reason: SDUPTHER

## 2019-07-08 RX ORDER — GLIMEPIRIDE 2 MG/1
2 TABLET ORAL EVERY MORNING
Qty: 90 TABLET | Refills: 0 | Status: SHIPPED | OUTPATIENT
Start: 2019-07-08 | End: 2019-10-08 | Stop reason: DRUGHIGH

## 2019-07-09 DIAGNOSIS — E11.9 TYPE 2 DIABETES MELLITUS WITHOUT COMPLICATION, WITHOUT LONG-TERM CURRENT USE OF INSULIN (HCC): ICD-10-CM

## 2019-07-09 DIAGNOSIS — I10 HYPERTENSION: ICD-10-CM

## 2019-07-10 RX ORDER — ATORVASTATIN CALCIUM 20 MG/1
TABLET, FILM COATED ORAL
Qty: 90 TABLET | Refills: 0 | Status: SHIPPED | OUTPATIENT
Start: 2019-07-10 | End: 2020-01-22 | Stop reason: SDUPTHER

## 2019-07-21 DIAGNOSIS — I10 ESSENTIAL HYPERTENSION: ICD-10-CM

## 2019-07-22 RX ORDER — VALSARTAN AND HYDROCHLOROTHIAZIDE 320; 25 MG/1; MG/1
1 TABLET, FILM COATED ORAL DAILY
Qty: 90 TABLET | Refills: 0 | Status: SHIPPED | OUTPATIENT
Start: 2019-07-22 | End: 2019-10-19 | Stop reason: SDUPTHER

## 2019-07-22 RX ORDER — AMLODIPINE BESYLATE 5 MG/1
TABLET ORAL
Qty: 90 TABLET | Refills: 0 | Status: SHIPPED | OUTPATIENT
Start: 2019-07-22 | End: 2019-10-19 | Stop reason: SDUPTHER

## 2019-07-23 ENCOUNTER — TELEPHONE (OUTPATIENT)
Dept: DERMATOLOGY | Age: 64
End: 2019-07-23

## 2019-07-23 RX ORDER — MINOCYCLINE HYDROCHLORIDE 50 MG/1
CAPSULE ORAL
Qty: 60 CAPSULE | Refills: 0 | Status: SHIPPED | OUTPATIENT
Start: 2019-07-23 | End: 2020-01-22 | Stop reason: SDUPTHER

## 2019-07-23 RX ORDER — TACROLIMUS 1 MG/G
OINTMENT TOPICAL
Qty: 30 G | Refills: 0 | Status: SHIPPED | OUTPATIENT
Start: 2019-07-23

## 2019-09-11 DIAGNOSIS — K21.9 GASTROESOPHAGEAL REFLUX DISEASE WITHOUT ESOPHAGITIS: ICD-10-CM

## 2019-09-11 RX ORDER — PANTOPRAZOLE SODIUM 40 MG/1
TABLET, DELAYED RELEASE ORAL
Qty: 90 TABLET | Refills: 0 | Status: SHIPPED | OUTPATIENT
Start: 2019-09-11 | End: 2019-12-21

## 2019-09-13 RX ORDER — CLONIDINE HYDROCHLORIDE 0.2 MG/1
TABLET ORAL
Qty: 180 TABLET | Refills: 0 | Status: SHIPPED | OUTPATIENT
Start: 2019-09-13 | End: 2020-01-22 | Stop reason: SDUPTHER

## 2019-10-01 DIAGNOSIS — E11.9 TYPE 2 DIABETES MELLITUS WITHOUT COMPLICATION, WITHOUT LONG-TERM CURRENT USE OF INSULIN (HCC): ICD-10-CM

## 2019-10-08 ENCOUNTER — OFFICE VISIT (OUTPATIENT)
Dept: PRIMARY CARE CLINIC | Age: 64
End: 2019-10-08
Payer: COMMERCIAL

## 2019-10-08 VITALS
OXYGEN SATURATION: 98 % | DIASTOLIC BLOOD PRESSURE: 63 MMHG | SYSTOLIC BLOOD PRESSURE: 100 MMHG | WEIGHT: 260.8 LBS | BODY MASS INDEX: 41.91 KG/M2 | HEART RATE: 60 BPM | HEIGHT: 66 IN

## 2019-10-08 DIAGNOSIS — E78.2 MIXED HYPERLIPIDEMIA: ICD-10-CM

## 2019-10-08 DIAGNOSIS — Z23 NEED FOR INFLUENZA VACCINATION: ICD-10-CM

## 2019-10-08 DIAGNOSIS — E11.9 TYPE 2 DIABETES MELLITUS WITHOUT COMPLICATION, WITHOUT LONG-TERM CURRENT USE OF INSULIN (HCC): Primary | ICD-10-CM

## 2019-10-08 DIAGNOSIS — I10 ESSENTIAL HYPERTENSION: ICD-10-CM

## 2019-10-08 DIAGNOSIS — E66.01 MORBID (SEVERE) OBESITY DUE TO EXCESS CALORIES (HCC): ICD-10-CM

## 2019-10-08 DIAGNOSIS — M67.441 GANGLION CYST OF FINGER OF RIGHT HAND: ICD-10-CM

## 2019-10-08 DIAGNOSIS — Z12.31 ENCOUNTER FOR MAMMOGRAM TO ESTABLISH BASELINE MAMMOGRAM: ICD-10-CM

## 2019-10-08 LAB — HBA1C MFR BLD: 9 %

## 2019-10-08 PROCEDURE — 90471 IMMUNIZATION ADMIN: CPT | Performed by: FAMILY MEDICINE

## 2019-10-08 PROCEDURE — 83036 HEMOGLOBIN GLYCOSYLATED A1C: CPT | Performed by: FAMILY MEDICINE

## 2019-10-08 PROCEDURE — 90686 IIV4 VACC NO PRSV 0.5 ML IM: CPT | Performed by: FAMILY MEDICINE

## 2019-10-08 PROCEDURE — 99214 OFFICE O/P EST MOD 30 MIN: CPT | Performed by: FAMILY MEDICINE

## 2019-10-08 RX ORDER — GLIMEPIRIDE 4 MG/1
4 TABLET ORAL EVERY MORNING
Qty: 30 TABLET | Refills: 3 | Status: SHIPPED | OUTPATIENT
Start: 2019-10-08 | End: 2020-01-22 | Stop reason: SDUPTHER

## 2019-10-08 ASSESSMENT — PATIENT HEALTH QUESTIONNAIRE - PHQ9
SUM OF ALL RESPONSES TO PHQ9 QUESTIONS 1 & 2: 1
2. FEELING DOWN, DEPRESSED OR HOPELESS: 1
SUM OF ALL RESPONSES TO PHQ QUESTIONS 1-9: 1
1. LITTLE INTEREST OR PLEASURE IN DOING THINGS: 0
SUM OF ALL RESPONSES TO PHQ QUESTIONS 1-9: 1

## 2019-10-08 ASSESSMENT — ENCOUNTER SYMPTOMS
SHORTNESS OF BREATH: 0
ORTHOPNEA: 0
VISUAL CHANGE: 0
BLURRED VISION: 0

## 2019-10-18 ENCOUNTER — NURSE TRIAGE (OUTPATIENT)
Dept: OTHER | Facility: CLINIC | Age: 64
End: 2019-10-18

## 2019-10-18 ENCOUNTER — OFFICE VISIT (OUTPATIENT)
Dept: INTERNAL MEDICINE CLINIC | Age: 64
End: 2019-10-18
Payer: COMMERCIAL

## 2019-10-18 VITALS
BODY MASS INDEX: 43.54 KG/M2 | SYSTOLIC BLOOD PRESSURE: 134 MMHG | HEART RATE: 88 BPM | HEIGHT: 64 IN | RESPIRATION RATE: 16 BRPM | OXYGEN SATURATION: 96 % | WEIGHT: 255 LBS | DIASTOLIC BLOOD PRESSURE: 79 MMHG | TEMPERATURE: 98.8 F

## 2019-10-18 DIAGNOSIS — M62.838 MUSCLE SPASMS OF NECK: ICD-10-CM

## 2019-10-18 DIAGNOSIS — T74.91XA DOMESTIC VIOLENCE OF ADULT, INITIAL ENCOUNTER: Primary | ICD-10-CM

## 2019-10-18 PROCEDURE — 99213 OFFICE O/P EST LOW 20 MIN: CPT | Performed by: STUDENT IN AN ORGANIZED HEALTH CARE EDUCATION/TRAINING PROGRAM

## 2019-10-18 RX ORDER — CYCLOBENZAPRINE HCL 5 MG
5 TABLET ORAL 3 TIMES DAILY PRN
Qty: 30 TABLET | Refills: 0 | Status: SHIPPED | OUTPATIENT
Start: 2019-10-18 | End: 2019-10-28

## 2019-10-18 RX ORDER — ASPIRIN 81 MG/1
81 TABLET ORAL DAILY
Qty: 30 TABLET | Refills: 0 | Status: ON HOLD | OUTPATIENT
Start: 2019-10-18 | End: 2021-10-13 | Stop reason: ALTCHOICE

## 2019-10-18 ASSESSMENT — ENCOUNTER SYMPTOMS
BACK PAIN: 1
BLOOD IN STOOL: 0
NAUSEA: 0
SORE THROAT: 0
ABDOMINAL PAIN: 0
COUGH: 0
CONSTIPATION: 0
DIARRHEA: 0
EYE REDNESS: 0
VOMITING: 0
CHEST TIGHTNESS: 0
SHORTNESS OF BREATH: 0
PHOTOPHOBIA: 0
TROUBLE SWALLOWING: 1
COLOR CHANGE: 1

## 2019-10-19 DIAGNOSIS — I10 ESSENTIAL HYPERTENSION: ICD-10-CM

## 2019-10-21 RX ORDER — AMLODIPINE BESYLATE 5 MG/1
TABLET ORAL
Qty: 90 TABLET | Refills: 0 | Status: SHIPPED | OUTPATIENT
Start: 2019-10-21 | End: 2020-01-22 | Stop reason: SDUPTHER

## 2019-10-21 RX ORDER — VALSARTAN AND HYDROCHLOROTHIAZIDE 320; 25 MG/1; MG/1
1 TABLET, FILM COATED ORAL DAILY
Qty: 90 TABLET | Refills: 0 | Status: SHIPPED | OUTPATIENT
Start: 2019-10-21 | End: 2020-01-22 | Stop reason: SDUPTHER

## 2019-11-06 ENCOUNTER — TELEPHONE (OUTPATIENT)
Dept: INTERNAL MEDICINE CLINIC | Age: 64
End: 2019-11-06

## 2020-01-16 ENCOUNTER — TELEPHONE (OUTPATIENT)
Dept: PRIMARY CARE CLINIC | Age: 65
End: 2020-01-16

## 2020-01-16 RX ORDER — FLUCONAZOLE 100 MG/1
200 TABLET ORAL ONCE
Qty: 2 TABLET | Refills: 0 | Status: SHIPPED | OUTPATIENT
Start: 2020-01-16 | End: 2020-01-16

## 2020-01-22 ENCOUNTER — OFFICE VISIT (OUTPATIENT)
Dept: PRIMARY CARE CLINIC | Age: 65
End: 2020-01-22
Payer: COMMERCIAL

## 2020-01-22 VITALS
WEIGHT: 248 LBS | HEART RATE: 62 BPM | BODY MASS INDEX: 43.94 KG/M2 | HEIGHT: 63 IN | DIASTOLIC BLOOD PRESSURE: 69 MMHG | SYSTOLIC BLOOD PRESSURE: 111 MMHG | OXYGEN SATURATION: 97 %

## 2020-01-22 LAB — HBA1C MFR BLD: 7.6 %

## 2020-01-22 PROCEDURE — 83036 HEMOGLOBIN GLYCOSYLATED A1C: CPT | Performed by: FAMILY MEDICINE

## 2020-01-22 PROCEDURE — 99214 OFFICE O/P EST MOD 30 MIN: CPT | Performed by: FAMILY MEDICINE

## 2020-01-22 RX ORDER — ATORVASTATIN CALCIUM 20 MG/1
20 TABLET, FILM COATED ORAL
COMMUNITY
Start: 2018-12-23 | End: 2020-02-03

## 2020-01-22 RX ORDER — OLMESARTAN MEDOXOMIL AND HYDROCHLOROTHIAZIDE 40/25 40; 25 MG/1; MG/1
1 TABLET ORAL
COMMUNITY
End: 2020-01-22 | Stop reason: SDUPTHER

## 2020-01-22 RX ORDER — FLUCONAZOLE 150 MG/1
TABLET ORAL
COMMUNITY
Start: 2019-11-22 | End: 2020-02-03 | Stop reason: SDUPTHER

## 2020-01-22 RX ORDER — GLIMEPIRIDE 2 MG/1
2 TABLET ORAL
COMMUNITY
Start: 2018-12-24 | End: 2020-03-31 | Stop reason: SDUPTHER

## 2020-01-22 RX ORDER — AMLODIPINE BESYLATE 5 MG/1
TABLET ORAL
COMMUNITY
Start: 2018-12-24 | End: 2020-02-07

## 2020-01-22 RX ORDER — SULFAMETHOXAZOLE AND TRIMETHOPRIM 800; 160 MG/1; MG/1
TABLET ORAL
COMMUNITY
Start: 2019-02-28 | End: 2020-01-22 | Stop reason: SDUPTHER

## 2020-01-22 RX ORDER — ALBUTEROL SULFATE 90 UG/1
AEROSOL, METERED RESPIRATORY (INHALATION)
COMMUNITY
Start: 2017-12-18 | End: 2021-01-22

## 2020-01-22 RX ORDER — SULFAMETHOXAZOLE AND TRIMETHOPRIM 800; 160 MG/1; MG/1
TABLET ORAL
COMMUNITY
Start: 2019-12-19 | End: 2020-01-22 | Stop reason: SDUPTHER

## 2020-01-22 RX ORDER — VALSARTAN AND HYDROCHLOROTHIAZIDE 320; 25 MG/1; MG/1
1 TABLET, FILM COATED ORAL
COMMUNITY
Start: 2018-12-23 | End: 2020-02-03

## 2020-01-22 RX ORDER — NAPROXEN 500 MG/1
TABLET ORAL
COMMUNITY
Start: 2015-06-03 | End: 2021-01-22 | Stop reason: ALTCHOICE

## 2020-01-22 RX ORDER — FLUCONAZOLE 150 MG/1
TABLET ORAL
COMMUNITY
Start: 2019-11-22 | End: 2020-02-03 | Stop reason: ALTCHOICE

## 2020-01-22 RX ORDER — MINOCYCLINE HYDROCHLORIDE 50 MG/1
CAPSULE ORAL
COMMUNITY
Start: 2018-12-23 | End: 2021-01-22 | Stop reason: SDUPTHER

## 2020-01-22 RX ORDER — CLONIDINE HYDROCHLORIDE 0.2 MG/1
TABLET ORAL
COMMUNITY
Start: 2019-01-22 | End: 2020-02-17

## 2020-01-22 RX ORDER — HYDROCODONE BITARTRATE AND ACETAMINOPHEN 5; 325 MG/1; MG/1
1-2 TABLET ORAL
COMMUNITY
Start: 2013-10-29 | End: 2021-01-22 | Stop reason: ALTCHOICE

## 2020-01-22 RX ORDER — ASPIRIN 325 MG
TABLET, DELAYED RELEASE (ENTERIC COATED) ORAL
COMMUNITY
End: 2020-01-22 | Stop reason: SDUPTHER

## 2020-01-22 RX ORDER — LOSARTAN POTASSIUM 25 MG/1
25 TABLET ORAL DAILY
Qty: 90 TABLET | Refills: 1 | Status: SHIPPED | OUTPATIENT
Start: 2020-01-22 | End: 2020-01-22 | Stop reason: CLARIF

## 2020-01-22 RX ORDER — FLUCONAZOLE 100 MG/1
TABLET ORAL
COMMUNITY
Start: 2020-01-16 | End: 2020-02-03 | Stop reason: ALTCHOICE

## 2020-01-22 RX ORDER — PANTOPRAZOLE SODIUM 40 MG/1
TABLET, DELAYED RELEASE ORAL
COMMUNITY
Start: 2018-12-24 | End: 2020-07-07 | Stop reason: SDUPTHER

## 2020-01-22 ASSESSMENT — ENCOUNTER SYMPTOMS
DIARRHEA: 0
COUGH: 0
COLOR CHANGE: 0
RECTAL PAIN: 0
NAUSEA: 0
BLOOD IN STOOL: 0
CHEST TIGHTNESS: 0
RHINORRHEA: 0
SHORTNESS OF BREATH: 0
TROUBLE SWALLOWING: 0
ABDOMINAL DISTENTION: 0
EYE ITCHING: 0
SINUS PRESSURE: 0
EYE REDNESS: 0
ANAL BLEEDING: 0
BACK PAIN: 0
ABDOMINAL PAIN: 0
CHOKING: 0
ORTHOPNEA: 0
EYE DISCHARGE: 0
BLURRED VISION: 0
VOMITING: 0
SORE THROAT: 0
WHEEZING: 0
CONSTIPATION: 0
APNEA: 0
PHOTOPHOBIA: 0
EYE PAIN: 0
STRIDOR: 0

## 2020-01-22 NOTE — PROGRESS NOTES
PND.   Gastrointestinal: Negative for abdominal distention, abdominal pain, anal bleeding, blood in stool, constipation, diarrhea, nausea, rectal pain and vomiting. Endocrine: Negative for polydipsia, polyphagia and polyuria. Genitourinary: Negative for decreased urine volume, difficulty urinating, dyspareunia, dysuria, enuresis, flank pain, frequency, genital sores, hematuria, impotence, menstrual problem, pelvic pain, urgency, vaginal bleeding and vaginal pain. Musculoskeletal: Positive for arthralgias. Negative for back pain, gait problem, joint swelling, myalgias, neck pain and neck stiffness. Skin: Negative for color change, pallor, rash and wound. Neurological: Negative for dizziness, tingling, tremors, seizures, syncope, facial asymmetry, speech difficulty, weakness, light-headedness, numbness and headaches. Hematological: Negative for adenopathy. Does not bruise/bleed easily. Psychiatric/Behavioral: Negative for agitation, behavioral problems, confusion, decreased concentration, dysphoric mood, hallucinations, self-injury, sleep disturbance and suicidal ideas. The patient is not nervous/anxious and is not hyperactive. Objective:   Physical Exam  Vitals signs and nursing note reviewed. Constitutional:       General: She is not in acute distress. Appearance: She is well-developed. She is not diaphoretic. HENT:      Head: Normocephalic and atraumatic. Right Ear: External ear normal.      Left Ear: External ear normal.      Nose: Nose normal.      Mouth/Throat:      Pharynx: No oropharyngeal exudate. Eyes:      General: No scleral icterus. Left eye: No discharge. Conjunctiva/sclera: Conjunctivae normal.      Pupils: Pupils are equal, round, and reactive to light. Neck:      Musculoskeletal: Normal range of motion and neck supple. Thyroid: No thyromegaly. Vascular: No JVD. Trachea: No tracheal deviation.    Cardiovascular:      Rate and Rhythm:

## 2020-02-03 RX ORDER — FLUCONAZOLE 150 MG/1
TABLET ORAL
Qty: 2 TABLET | Refills: 0 | Status: SHIPPED | OUTPATIENT
Start: 2020-02-03 | End: 2020-04-01 | Stop reason: SDUPTHER

## 2020-03-24 ENCOUNTER — VIRTUAL VISIT (OUTPATIENT)
Dept: SLEEP MEDICINE | Age: 65
End: 2020-03-24

## 2020-03-31 RX ORDER — GLIMEPIRIDE 4 MG/1
TABLET ORAL
Qty: 30 TABLET | Refills: 3 | Status: SHIPPED | OUTPATIENT
Start: 2020-03-31 | End: 2020-11-24

## 2020-04-01 ENCOUNTER — TELEPHONE (OUTPATIENT)
Dept: PRIMARY CARE CLINIC | Age: 65
End: 2020-04-01

## 2020-04-01 NOTE — TELEPHONE ENCOUNTER
Patient believes she has yeast infection and would like prescription sent to [facility]. She believes it was Flagyl one dose last time. Please call and advise.

## 2020-06-03 ENCOUNTER — OFFICE VISIT (OUTPATIENT)
Dept: DERMATOLOGY | Age: 65
End: 2020-06-03
Payer: COMMERCIAL

## 2020-06-03 VITALS — TEMPERATURE: 94.7 F

## 2020-06-03 PROCEDURE — 99213 OFFICE O/P EST LOW 20 MIN: CPT | Performed by: DERMATOLOGY

## 2020-06-03 RX ORDER — MINOCYCLINE HYDROCHLORIDE 50 MG/1
50 CAPSULE ORAL 2 TIMES DAILY
Qty: 60 CAPSULE | Refills: 2 | Status: SHIPPED | OUTPATIENT
Start: 2020-06-03 | End: 2021-01-27 | Stop reason: SDUPTHER

## 2020-06-03 RX ORDER — CLOBETASOL PROPIONATE 0.5 MG/G
OINTMENT TOPICAL
Qty: 45 G | Refills: 1 | Status: SHIPPED | OUTPATIENT
Start: 2020-06-03 | End: 2021-01-22

## 2020-06-03 NOTE — PROGRESS NOTES
[facility]  Ashlee Fabienne Kristen Fisher  1955    71 y.o. female     Date of Visit: 6/3/2020    Chief Complaint: rosacea    History of Present Illness:    Last seen 2 years ago:    1. Papulopustular rosacea - flaring lately on the lower face. Worse with wearing mask at work. Did well with minocycline and Protopic 0.1% ointment in the past.     2.  F/u for lichenified dermatitis on the left thigh - worse lately, complains of pruritis. Review of Systems:  Gen: Feels well, good sense of health. Past Medical History, Family History, Surgical History, Medications and Allergies reviewed. Past Medical History:   Diagnosis Date    Diabetes mellitus ([address]     GERD (gastroesophageal reflux disease)     Hyperlipidemia     Hypertension     Obesity     S/P hip replacement     Sleep apnea      Past Surgical History:   Procedure Laterality Date    JOINT REPLACEMENT      TOTAL HIP ARTHROPLASTY Left Oct '13       Allergies   Allergen Reactions    Codeine Itching    Penicillins Itching     Outpatient Medications Marked as Taking for the 6/3/20 encounter (Office Visit) with Nicolas Kidd MD   Medication Sig Dispense Refill    minocycline (MINOCIN;DYNACIN) 50 MG capsule Take 1 capsule by mouth 2 times daily 60 capsule 2    clobetasol (TEMOVATE) 0.05 % ointment Apply to affected area on the left thig twice daily until improved.  45 g 1    glimepiride (AMARYL) 4 MG tablet TAKE 1 TABLET BY MOUTH EVERY MORNING 30 tablet 3    cloNIDine (CATAPRES) 0.2 MG tablet TAKE 1 TABLET BY MOUTH TWICE DAILY 180 tablet 1    amLODIPine (NORVASC) 5 MG tablet TAKE 1 TABLET BY MOUTH DAILY 90 tablet 2    valsartan-hydrochlorothiazide (DIOVAN-HCT) 320-25 MG per tablet TAKE 1 TABLET BY MOUTH DAILY 90 tablet 3    atorvastatin (LIPITOR) 20 MG tablet TAKE 1 TABLET BY MOUTH DAILY 90 tablet 3    estradiol (ESTRING) 2 MG vaginal ring Place 2 mg vaginally      HYDROcodone-acetaminophen (NORCO) 5-325 MG per tablet Take 1-2 tablets by mouth.  albuterol sulfate  (90 Base) MCG/ACT inhaler Inhale into the lungs      canagliflozin-metformin HCl (INVOKAMET) 150-500 MG TAKE 1 TABLET BY MOUTH TWICE DAILY WITH MEALS      metoprolol tartrate (LOPRESSOR) 25 MG tablet Take 25 mg by mouth      minocycline (MINOCIN;DYNACIN) 50 MG capsule TK ONE C PO  BID FOR 2 TO 4 WEEKS FOR ROSACEA FLARE UPS      naproxen (NAPROSYN) 500 MG tablet Take by mouth      pantoprazole (PROTONIX) 40 MG tablet TAKE 1 TABLET BY MOUTH DAILY      pantoprazole (PROTONIX) 40 MG tablet TAKE 1 TABLET BY MOUTH DAILY 90 tablet 1    aspirin 81 MG EC tablet Take 1 tablet by mouth daily 30 tablet 0    Dulaglutide (TRULICITY) 0.53 CY/4.0ZT SOPN Inject 0.75 mg into the skin once a week 4 pen 5    tacrolimus (PROTOPIC) 0.1 % ointment Apply topically to the face 2 times daily if needed for rosacea. 30 g 0    clobetasol (TEMOVATE) 0.05 % ointment Apply to affected area on the left thigh twice daily for up to 2 weeks or until improved. 45 g 2    albuterol sulfate HFA (PROVENTIL HFA) 108 (90 Base) MCG/ACT inhaler Inhale 2 puffs into the lungs every 6 hours as needed for Wheezing 1 Inhaler 3    mometasone (NASONEX) 50 MCG/ACT nasal spray SHAKE WELL AND USE 2 SPRAYS NASALLY DAILY 34 g 5    TRUE METRIX BLOOD GLUCOSE TEST strip FOLLOW PACKAGE DIRECTIONS (Patient taking differently: test blood sugar daily) 100 strip 5    Blood Glucose Monitoring Suppl BROCK Dispense one glucose monitor with supplies(any monitor covered by insurance  AIC 8.2  Test glucose twice a day 1 Device 0    Glucose Blood (BLOOD GLUCOSE TEST STRIPS) STRP Dispense glucose strips covered by insurance and compatible with her glucose monitor  AIC 8.2  Test glucose twice a day 200 strip 5    Lancets MISC Dispense any lancets covered by insurance  AIC 8.2  Test glucose twice a day 100 each 5      for [employer].     Physical

## 2020-06-11 RX ORDER — DULAGLUTIDE 0.75 MG/.5ML
INJECTION, SOLUTION SUBCUTANEOUS
Qty: 2 ML | Refills: 2 | Status: SHIPPED | OUTPATIENT
Start: 2020-06-11 | End: 2020-11-17

## 2020-07-07 RX ORDER — PANTOPRAZOLE SODIUM 40 MG/1
TABLET, DELAYED RELEASE ORAL
Qty: 90 TABLET | Refills: 1 | Status: SHIPPED | OUTPATIENT
Start: 2020-07-07 | End: 2021-02-02 | Stop reason: SDUPTHER

## 2020-07-12 ENCOUNTER — HOSPITAL ENCOUNTER (EMERGENCY)
Age: 65
Discharge: HOME | End: 2020-07-12
Attending: EMERGENCY MEDICINE
Payer: COMMERCIAL

## 2020-07-12 VITALS
OXYGEN SATURATION: 96 % | RESPIRATION RATE: 16 BRPM | BODY MASS INDEX: 42.87 KG/M2 | DIASTOLIC BLOOD PRESSURE: 65 MMHG | HEART RATE: 69 BPM | TEMPERATURE: 98.2 F | SYSTOLIC BLOOD PRESSURE: 122 MMHG | WEIGHT: 242 LBS

## 2020-07-12 LAB
A/G RATIO: 1.2 (ref 1.1–2.2)
ALBUMIN SERPL-MCNC: 4.1 G/DL (ref 3.4–5)
ALP BLD-CCNC: 86 U/L (ref 40–129)
ALT SERPL-CCNC: 13 U/L (ref 10–40)
ANION GAP SERPL CALCULATED.3IONS-SCNC: 14 MMOL/L (ref 3–16)
AST SERPL-CCNC: 19 U/L (ref 15–37)
BASOPHILS ABSOLUTE: 0 K/UL (ref 0–0.2)
BASOPHILS RELATIVE PERCENT: 0.6 %
BILIRUB SERPL-MCNC: 0.5 MG/DL (ref 0–1)
BUN BLDV-MCNC: 31 MG/DL (ref 7–20)
CALCIUM SERPL-MCNC: 9.8 MG/DL (ref 8.3–10.6)
CHLORIDE BLD-SCNC: 103 MMOL/L (ref 99–110)
CO2: 26 MMOL/L (ref 21–32)
CREAT SERPL-MCNC: 1.2 MG/DL (ref 0.6–1.2)
EOSINOPHILS ABSOLUTE: 0.3 K/UL (ref 0–0.6)
EOSINOPHILS RELATIVE PERCENT: 3.6 %
GFR AFRICAN AMERICAN: 54
GFR NON-AFRICAN AMERICAN: 45
GLOBULIN: 3.5 G/DL
GLUCOSE BLD-MCNC: 145 MG/DL (ref 70–99)
HCT VFR BLD CALC: 37.3 % (ref 36–48)
HEMOGLOBIN: 12.4 G/DL (ref 12–16)
LYMPHOCYTES ABSOLUTE: 1.3 K/UL (ref 1–5.1)
LYMPHOCYTES RELATIVE PERCENT: 16.5 %
MCH RBC QN AUTO: 28.9 PG (ref 26–34)
MCHC RBC AUTO-ENTMCNC: 33.3 G/DL (ref 31–36)
MCV RBC AUTO: 86.5 FL (ref 80–100)
MONOCYTES ABSOLUTE: 0.9 K/UL (ref 0–1.3)
MONOCYTES RELATIVE PERCENT: 10.8 %
NEUTROPHILS ABSOLUTE: 5.4 K/UL (ref 1.7–7.7)
NEUTROPHILS RELATIVE PERCENT: 68.5 %
PDW BLD-RTO: 15.5 % (ref 12.4–15.4)
PLATELET # BLD: 220 K/UL (ref 135–450)
PMV BLD AUTO: 9.9 FL (ref 5–10.5)
POTASSIUM REFLEX MAGNESIUM: 3.7 MMOL/L (ref 3.5–5.1)
PRO-BNP: 17 PG/ML (ref 0–124)
RBC # BLD: 4.31 M/UL (ref 4–5.2)
SODIUM BLD-SCNC: 143 MMOL/L (ref 136–145)
TOTAL PROTEIN: 7.6 G/DL (ref 6.4–8.2)
TROPONIN: <0.01 NG/ML
WBC # BLD: 7.9 K/UL (ref 4–11)

## 2020-07-12 PROCEDURE — 99284 EMERGENCY DEPT VISIT MOD MDM: CPT

## 2020-07-12 PROCEDURE — 80053 COMPREHEN METABOLIC PANEL: CPT

## 2020-07-12 PROCEDURE — 84484 ASSAY OF TROPONIN QUANT: CPT

## 2020-07-12 PROCEDURE — 2580000003 HC RX 258: Performed by: EMERGENCY MEDICINE

## 2020-07-12 PROCEDURE — 85025 COMPLETE CBC W/AUTO DIFF WBC: CPT

## 2020-07-12 PROCEDURE — 93005 ELECTROCARDIOGRAM TRACING: CPT | Performed by: EMERGENCY MEDICINE

## 2020-07-12 PROCEDURE — 83880 ASSAY OF NATRIURETIC PEPTIDE: CPT

## 2020-07-12 RX ORDER — 0.9 % SODIUM CHLORIDE 0.9 %
1000 INTRAVENOUS SOLUTION INTRAVENOUS ONCE
Status: COMPLETED | OUTPATIENT
Start: 2020-07-12 | End: 2020-07-12

## 2020-07-12 RX ADMIN — SODIUM CHLORIDE 1000 ML: 9 INJECTION, SOLUTION INTRAVENOUS at 05:48

## 2020-07-12 NOTE — ED PROVIDER NOTES
[id number] [address] ENCOUNTER        Pt Name: Amanda Alford  MRN: [id number]  [place] 1955  Date of evaluation: 7/12/2020  Provider: Renu Banda MD  PCP: Timoteo Feliciano MD    This patient was seen and evaluated by the attending physician Renu Banda MD.      [address] [place] [address]       Chief Complaint   Patient presents with    Dizziness       HISTORY OF PRESENT ILLNESS   (Location/Symptom, Timing/Onset, Context/Setting, Quality, Duration, Modifying Factors, Severity)  Note limiting factors. Amanda Alford is a 71 y.o. female with hx of DM, HTN, HLD, p/w cc of feeling dizzy and lightheaded intermittently since yesterday morning. No nausea, vomiting, chest pain or palpitations. No tingling numbness weakness or paresthesia. Nursing Notes were all reviewed and agreed with or any disagreements were addressed  in the HPI. REVIEW OF SYSTEMS    (2-9 systems for level 4, 10 or more for level 5)     Review of Systems    Positives and Pertinent negatives as per HPI. Except as noted abovein the ROS, all other systems were reviewed and negative.        PAST MEDICAL HISTORY     Past Medical History:   Diagnosis Date    Diabetes mellitus ([facility] [place] [address]     GERD (gastroesophageal reflux disease)     Hyperlipidemia     Hypertension     Obesity     S/P hip replacement     Sleep apnea          SURGICAL HISTORY     Past Surgical History:   Procedure Laterality Date    JOINT REPLACEMENT      TOTAL HIP ARTHROPLASTY Left Oct '13         CURRENTMEDICATIONS       Previous Medications    ALBUTEROL SULFATE HFA (PROVENTIL HFA) 108 (90 BASE) MCG/ACT INHALER    Inhale 2 puffs into the lungs every 6 hours as needed for Wheezing    ALBUTEROL SULFATE  (90 BASE) MCG/ACT INHALER    Inhale into the lungs    AMLODIPINE (NORVASC) 5 MG TABLET    TAKE 1 TABLET BY MOUTH DAILY    ASPIRIN 81 MG EC TABLET    Take 1 tablet by mouth daily    ATORVASTATIN (LIPITOR) 20 MG TABLET TAKE 1 TABLET BY MOUTH DAILY    BLOOD GLUCOSE MONITORING SUPPL BROCK    Dispense one glucose monitor with supplies(any monitor covered by insurance  AIC 8.2  Test glucose twice a day    CANAGLIFLOZIN-METFORMIN HCL (INVOKAMET) 150-500 MG    TAKE 1 TABLET BY MOUTH TWICE DAILY WITH MEALS    CLOBETASOL (TEMOVATE) 0.05 % OINTMENT    Apply to affected area on the left thigh twice daily for up to 2 weeks or until improved. CLOBETASOL (TEMOVATE) 0.05 % OINTMENT    Apply to affected area on the left thig twice daily until improved. CLONIDINE (CATAPRES) 0.2 MG TABLET    TAKE 1 TABLET BY MOUTH TWICE DAILY    ESTRADIOL (ESTRING) 2 MG VAGINAL RING    Place 2 mg vaginally    GLIMEPIRIDE (AMARYL) 4 MG TABLET    TAKE 1 TABLET BY MOUTH EVERY MORNING    GLUCOSE BLOOD (BLOOD GLUCOSE TEST STRIPS) STRP    Dispense glucose strips covered by insurance and compatible with her glucose monitor  AIC 8.2  Test glucose twice a day    HYDROCODONE-ACETAMINOPHEN (NORCO) 5-325 MG PER TABLET    Take 1-2 tablets by mouth. LANCETS MISC    Dispense any lancets covered by insurance  AIC 8.2  Test glucose twice a day    METOPROLOL TARTRATE (LOPRESSOR) 25 MG TABLET    Take 25 mg by mouth    MINOCYCLINE (MINOCIN;DYNACIN) 50 MG CAPSULE    TK ONE C PO  BID FOR 2 TO 4 WEEKS FOR ROSACEA FLARE UPS    MINOCYCLINE (MINOCIN;DYNACIN) 50 MG CAPSULE    Take 1 capsule by mouth 2 times daily    MOMETASONE (NASONEX) 50 MCG/ACT NASAL SPRAY    SHAKE WELL AND USE 2 SPRAYS NASALLY DAILY    NAPROXEN (NAPROSYN) 500 MG TABLET    Take by mouth    PANTOPRAZOLE (PROTONIX) 40 MG TABLET    TAKE 1 TABLET BY MOUTH DAILY    TACROLIMUS (PROTOPIC) 0.1 % OINTMENT    Apply topically to the face 2 times daily if needed for rosacea.     TRUE METRIX BLOOD GLUCOSE TEST STRIP    FOLLOW PACKAGE DIRECTIONS    TRULICITY 1.59 OC/4.8SJ SOPN    INJECT 0.75 MG UNDER THE SKIN ONCE WEEKLY    VALSARTAN-HYDROCHLOROTHIAZIDE (DIOVAN-HCT) 320-25 MG PER TABLET    TAKE 1 TABLET BY MOUTH DAILY ALLERGIES     Codeine and Penicillins    FAMILYHISTORY       Family History   Problem Relation Age of Onset    Heart Disease Mother           SOCIAL HISTORY       Social History     Socioeconomic History    Marital status:      Spouse name: None    Number of children: None    Years of education: None    Highest education level: None   Occupational History    None   Social Needs    Financial resource strain: None    Food insecurity     Worry: None     Inability: None    Transportation needs     Medical: None     Non-medical: None   Tobacco Use    Smoking status: Never Smoker    Smokeless tobacco: Never Used   Substance and Sexual Activity    Alcohol use: No    Drug use: No    Sexual activity: Yes     Partners: Male   Lifestyle    Physical activity     Days per week: None     Minutes per session: None    Stress: None   Relationships    Social connections     Talks on phone: None     Gets together: None     Attends [employer] witness service: None     Active member of club or organization: None     Attends meetings of clubs or organizations: None     Relationship status: None    Intimate partner violence     Fear of current or ex partner: None     Emotionally abused: None     Physically abused: None     Forced sexual activity: None   Other Topics Concern    None   Social History Narrative    None       SCREENINGS    [place] Coma Scale  Eye Opening: Spontaneous  Best Verbal Response: Oriented  Best Motor Response: Obeys commands  [place] Coma Scale Score: 15        PHYSICAL EXAM    (up to 7 for level 4, 8 or more for level 5)     ED Triage Vitals   BP Temp Temp src Pulse Resp SpO2 Height Weight   -- -- -- -- -- -- -- --       Physical Exam     General Appearance:  Alert, cooperative, no distress, appears stated age. Head:  Normocephalic, without obvious abnormality, atraumatic. Eyes:  conjunctiva/corneas clear, EOM's intact. Sclera anicteric.    ENT: Mucous membranes moist.   Neck: Supple, symmetrical, trachea midline, no adenopathy. No jugular venous distention. Lungs:   No Respiratory Distress. Chest Wall:  Atraumatic   Heart:  RRR no m/c/g/r   Abdomen:   Soft, NT, ND   Extremities:  Full range of motion. Pulses: Symmetric x4   Skin:  No rashes or lesions to exposed skin. Neurologic: Alert and oriented X 3. Motor grossly normal.  Speech clear. DIAGNOSTIC RESULTS   LABS:    Labs Reviewed   CBC WITH AUTO DIFFERENTIAL - Abnormal; Notable for the following components:       Result Value    RDW 15.5 (*)     All other components within normal limits    Narrative:     Performed at:  [facility]  [facility] [place],  [facility] [employer] [employer]   Phone [phone]   COMPREHENSIVE METABOLIC PANEL W/ REFLEX TO MG FOR LOW K - Abnormal; Notable for the following components:    Glucose 145 (*)     BUN 31 (*)     GFR Non- 45 (*)     GFR  54 (*)     All other components within normal limits    Narrative:     Performed at:  [facility]  [employer] [place],  [place] [facility] [employer]   Phone [phone]   TROPONIN    Narrative:     Performed at:  [facility]  [place],  [employer]   Phone [phone] PEPTIDE    Narrative:     Performed at:  [facility]  [employer] [place],  [place] [facility] [facility] [employer]   Phone [phone]       All other labs were within normal range or not returned as of this dictation. EKG: All EKG's are interpreted by the Emergency Department Physician in the absence of a cardiologist.  Please see their note for interpretation of EKG. EKG reviewed by myself. Dated today 05 27. Rate 72. Normal sinus rhythm. RSR prime V1. No change compared to January 2018.     RADIOLOGY:   Non-plain film images such as CT, Ultrasound and MRI are read by the radiologist. Shannan Waller radiographic images are visualized andpreliminarily interpreted by the  ED Provider with the below findings:        Interpretation perthe Radiologist below, if available at the time of this note:    No orders to display     No results found. PROCEDURES   Unless otherwise noted below, none     Procedures    CRITICAL CARE TIME   N/A    CONSULTS:  None      EMERGENCY DEPARTMENT COURSE and DIFFERENTIAL DIAGNOSIS/MDM:   Vitals:    Vitals:    07/12/20 0525 07/12/20 0600   BP: (!) 152/83 117/69   Pulse: 89 75   Resp: 16 16   Temp: 98.2 °F (36.8 °C)    TempSrc: Oral    SpO2: 99% 95%   Weight: 109.8 kg (242 lb)        Patient was given thefollowing medications:  Medications   0.9 % sodium chloride bolus (1,000 mLs Intravenous New Bag 7/12/20 0548)       64yoF with lightheadedness for past day  Checking labs, EKG. EKG WNL  Labs benign. Improved after ED treatment. DC home with PCP followup. FINAL IMPRESSION      1.  Lightheadedness          DISPOSITION/PLAN   DISPOSITION        PATIENT REFERREDTO:  Cliff Rios MD  [address] [address] [phone]            DISCHARGE MEDICATIONS:  New Prescriptions    No medications on file       DISCONTINUED MEDICATIONS:  Discontinued Medications    No medications on file              (Please note that portions ofthis note were completed with a voice recognition program.  Efforts were made to edit the dictations but occasionally words are mis-transcribed.)    Kedric [place], MD (electronically signed)           Kedric [place], MD  07/12/20 2990

## 2020-07-14 LAB
EKG ATRIAL RATE: 72 BPM
EKG DIAGNOSIS: NORMAL
EKG P AXIS: 50 DEGREES
EKG P-R INTERVAL: 158 MS
EKG Q-T INTERVAL: 394 MS
EKG QRS DURATION: 94 MS
EKG QTC CALCULATION (BAZETT): 431 MS
EKG R AXIS: -26 DEGREES
EKG T AXIS: 25 DEGREES
EKG VENTRICULAR RATE: 72 BPM

## 2020-07-14 PROCEDURE — 93010 ELECTROCARDIOGRAM REPORT: CPT | Performed by: INTERNAL MEDICINE

## 2020-11-24 RX ORDER — GLIMEPIRIDE 4 MG/1
TABLET ORAL
Qty: 90 TABLET | Refills: 1 | Status: SHIPPED | OUTPATIENT
Start: 2020-11-24 | End: 2021-01-05

## 2020-12-05 RX ORDER — CLONIDINE HYDROCHLORIDE 0.2 MG/1
TABLET ORAL
Qty: 180 TABLET | Refills: 1 | Status: SHIPPED | OUTPATIENT
Start: 2020-12-05 | End: 2021-06-02 | Stop reason: SDUPTHER

## 2021-01-05 DIAGNOSIS — E11.9 TYPE 2 DIABETES MELLITUS WITHOUT COMPLICATION, WITHOUT LONG-TERM CURRENT USE OF INSULIN (HCC): ICD-10-CM

## 2021-01-05 DIAGNOSIS — N76.0 ACUTE VAGINITIS: ICD-10-CM

## 2021-01-05 RX ORDER — GLIMEPIRIDE 4 MG/1
TABLET ORAL
Qty: 90 TABLET | Refills: 1 | Status: SHIPPED | OUTPATIENT
Start: 2021-01-05 | End: 2021-01-22 | Stop reason: SDUPTHER

## 2021-01-05 RX ORDER — FLUCONAZOLE 150 MG/1
TABLET ORAL
Qty: 1 TABLET | Refills: 0 | Status: SHIPPED | OUTPATIENT
Start: 2021-01-05 | End: 2021-01-22 | Stop reason: ALTCHOICE

## 2021-01-05 NOTE — TELEPHONE ENCOUNTER
Medication:   Requested Prescriptions     Pending Prescriptions Disp Refills    glimepiride (AMARYL) 4 MG tablet [Pharmacy Med Name: GLIMEPIRIDE 4MG TABLETS] 90 tablet 1     Sig: TAKE 1 TABLET BY MOUTH EVERY MORNING    fluconazole (DIFLUCAN) 150 MG tablet [Pharmacy Med Name: FLUCONAZOLE 150MG TABLETS] 1 tablet 0     Sig: TAKE 1 TABLET BY MOUTH 1 TIME FOR 1 DOSE        Last Filled:      Patient Phone Number: [phone] (home) [phone] (work)    Last appt: 4/1/2020   Next appt: 1/22/2021    Last OARRS:   RX Monitoring 7/28/2015   Periodic Controlled Substance Monitoring No signs of potential drug abuse or diversion identified.

## 2021-01-22 ENCOUNTER — OFFICE VISIT (OUTPATIENT)
Dept: PRIMARY CARE CLINIC | Age: 66
End: 2021-01-22
Payer: COMMERCIAL

## 2021-01-22 VITALS
TEMPERATURE: 97.2 F | DIASTOLIC BLOOD PRESSURE: 64 MMHG | WEIGHT: 253 LBS | OXYGEN SATURATION: 97 % | HEART RATE: 68 BPM | BODY MASS INDEX: 44.82 KG/M2 | SYSTOLIC BLOOD PRESSURE: 114 MMHG

## 2021-01-22 DIAGNOSIS — E11.9 TYPE 2 DIABETES MELLITUS WITHOUT COMPLICATION, WITHOUT LONG-TERM CURRENT USE OF INSULIN (HCC): Primary | ICD-10-CM

## 2021-01-22 DIAGNOSIS — Z12.31 ENCOUNTER FOR MAMMOGRAM TO ESTABLISH BASELINE MAMMOGRAM: ICD-10-CM

## 2021-01-22 DIAGNOSIS — E78.2 MIXED HYPERLIPIDEMIA: ICD-10-CM

## 2021-01-22 DIAGNOSIS — Z12.11 SCREEN FOR COLON CANCER: ICD-10-CM

## 2021-01-22 DIAGNOSIS — I10 ESSENTIAL HYPERTENSION: ICD-10-CM

## 2021-01-22 LAB — HBA1C MFR BLD: 9.1 %

## 2021-01-22 PROCEDURE — 83036 HEMOGLOBIN GLYCOSYLATED A1C: CPT | Performed by: FAMILY MEDICINE

## 2021-01-22 PROCEDURE — 99214 OFFICE O/P EST MOD 30 MIN: CPT | Performed by: FAMILY MEDICINE

## 2021-01-22 RX ORDER — GLIMEPIRIDE 4 MG/1
TABLET ORAL
Qty: 90 TABLET | Refills: 1 | Status: SHIPPED | OUTPATIENT
Start: 2021-01-22 | End: 2021-07-23 | Stop reason: SDUPTHER

## 2021-01-22 RX ORDER — GLUCOSAMINE HCL/CHONDROITIN SU 500-400 MG
CAPSULE ORAL
Qty: 200 STRIP | Refills: 5 | Status: SHIPPED | OUTPATIENT
Start: 2021-01-22 | End: 2022-01-27

## 2021-01-22 RX ORDER — PIOGLITAZONEHYDROCHLORIDE 30 MG/1
30 TABLET ORAL DAILY
Qty: 30 TABLET | Refills: 3 | Status: SHIPPED | OUTPATIENT
Start: 2021-01-22 | End: 2021-01-24 | Stop reason: SDUPTHER

## 2021-01-22 RX ORDER — CANAGLIFLOZIN AND METFORMIN HYDROCHLORIDE 150; 500 MG/1; MG/1
TABLET, FILM COATED ORAL
Qty: 180 TABLET | Refills: 2 | Status: SHIPPED | OUTPATIENT
Start: 2021-01-22 | End: 2021-06-02 | Stop reason: SDUPTHER

## 2021-01-22 RX ORDER — DULAGLUTIDE 0.75 MG/.5ML
INJECTION, SOLUTION SUBCUTANEOUS
Qty: 2 ML | Refills: 5 | Status: SHIPPED | OUTPATIENT
Start: 2021-01-22 | End: 2021-09-20

## 2021-01-22 ASSESSMENT — ENCOUNTER SYMPTOMS
CHEST TIGHTNESS: 0
NAUSEA: 0
BLOOD IN STOOL: 0
DIARRHEA: 0
BACK PAIN: 0
ABDOMINAL DISTENTION: 0
WHEEZING: 0
COUGH: 0
SORE THROAT: 0
STRIDOR: 0
RECTAL PAIN: 0
COLOR CHANGE: 0
SINUS PRESSURE: 0
CHOKING: 0
SHORTNESS OF BREATH: 0
EYE DISCHARGE: 0
EYE PAIN: 0
VOMITING: 0
PHOTOPHOBIA: 0
TROUBLE SWALLOWING: 0
RHINORRHEA: 0
EYE REDNESS: 0
APNEA: 0
EYE ITCHING: 0
CONSTIPATION: 0

## 2021-01-22 ASSESSMENT — PATIENT HEALTH QUESTIONNAIRE - PHQ9
SUM OF ALL RESPONSES TO PHQ QUESTIONS 1-9: 0
SUM OF ALL RESPONSES TO PHQ9 QUESTIONS 1 & 2: 0

## 2021-01-22 NOTE — PROGRESS NOTES
HPI 75 y/o female known dm-2(last AIC 7.6 and 9.1 today), hypertension, hyperlipidemia    She is fu today    Dm-2  No blurred vision, polyuria, polydipsia, but some wt gain  Am glucose 170 -180  No hypoglycemia    Hypertension  No ha or sob or chest pain    Hyperlipidemia  No muscle aches    Past Medical History:   Diagnosis Date    [facility] ([facility] [address]     GERD (gastroesophageal reflux disease)     Hyperlipidemia     Hypertension     Obesity     S/P hip replacement     Sleep apnea          Allergies   Allergen Reactions    Codeine Itching    Penicillins Itching   Review of Systems   Constitutional: Negative for activity change, appetite change, chills, diaphoresis, fatigue and fever. HENT: Negative for congestion, dental problem, drooling, ear discharge, ear pain, hearing loss, mouth sores, nosebleeds, postnasal drip, rhinorrhea, sinus pressure, sneezing, sore throat, tinnitus and trouble swallowing. Eyes: Negative for photophobia, pain, discharge, redness, itching and visual disturbance. Respiratory: Negative for apnea, cough, choking, chest tightness, shortness of breath, wheezing and stridor. Cardiovascular: Negative for chest pain, palpitations and leg swelling. Gastrointestinal: Negative for abdominal distention, blood in stool, constipation, diarrhea, nausea, rectal pain and vomiting. Genitourinary: Negative for decreased urine volume, difficulty urinating, dyspareunia, dysuria, enuresis, flank pain, frequency, genital sores, hematuria, menstrual problem, pelvic pain, urgency, vaginal bleeding and vaginal pain. Musculoskeletal: Negative for arthralgias, back pain, gait problem, joint swelling, myalgias, neck pain and neck stiffness. Skin: Negative for color change, pallor, rash and wound. Neurological: Negative for dizziness, tremors, seizures, syncope, facial asymmetry, speech difficulty, weakness, light-headedness, numbness and headaches. Hematological: Negative for adenopathy. Does not bruise/bleed easily. Psychiatric/Behavioral: Negative for agitation, behavioral problems, confusion, decreased concentration, dysphoric mood, hallucinations, self-injury, sleep disturbance and suicidal ideas. The patient is not nervous/anxious and is not hyperactive. Physical Exam  Constitutional:       General: She is not in acute distress. Appearance: She is well-developed. She is not diaphoretic. HENT:      Head: Normocephalic and atraumatic. Right Ear: External ear normal.      Left Ear: External ear normal.      Nose: Nose normal.      Mouth/Throat:      Pharynx: No oropharyngeal exudate. Eyes:      General: No scleral icterus. Left eye: No discharge. Conjunctiva/sclera: Conjunctivae normal.      Pupils: Pupils are equal, round, and reactive to light. Neck:      Musculoskeletal: Normal range of motion and neck supple. Thyroid: No thyromegaly. Vascular: No JVD. Trachea: No tracheal deviation. Cardiovascular:      Rate and Rhythm: Normal rate and regular rhythm. Heart sounds: Normal heart sounds. No murmur. No friction rub. No gallop. Pulmonary:      Effort: Pulmonary effort is normal. No respiratory distress. Breath sounds: Normal breath sounds. No stridor. No wheezing or rales. Chest:      Chest wall: No tenderness. Abdominal:      General: Bowel sounds are normal. There is no distension. Palpations: Abdomen is soft. There is no mass. Tenderness: There is no abdominal tenderness. There is no guarding or rebound. Musculoskeletal: Normal range of motion. General: No tenderness. Lymphadenopathy:      Cervical: No cervical adenopathy. Skin:     General: Skin is warm and dry. Coloration: Skin is not pale. Findings: No erythema or rash. Neurological:      Mental Status: She is alert and oriented to person, place, and time. Cranial Nerves: No cranial nerve deficit. Coordination: Coordination normal.      Deep Tendon Reflexes: Reflexes are normal and symmetric. Reflexes normal.   Psychiatric:         Behavior: Behavior normal.         Thought Content: Thought content normal.         Judgment: Judgment normal.       Lab Results   Component Value Date    CHOL 158 05/27/2016    CHOL 162 06/03/2015    CHOL 103 12/28/2012     Lab Results   Component Value Date    TRIG 88 05/27/2016    TRIG 98 06/03/2015    TRIG 109 08/15/2012     Lab Results   Component Value Date    HDL 43 05/27/2016    HDL 44 06/03/2015    HDL 42 08/15/2012     Lab Results   Component Value Date    LDLCALC 97 05/27/2016    LDLCALC 98 06/03/2015    LDLCALC 114 08/15/2012     Lab Results   Component Value Date    LABVLDL 18 05/27/2016    LABVLDL 20 06/03/2015    LABVLDL 21 01/30/2012     Lab Results   Component Value Date    CHOLHDLRATIO 4.2 08/15/2012     Lab Results   Component Value Date    CREATININE 1.2 07/12/2020    BUN 31 (H) 07/12/2020     07/12/2020    K 3.7 07/12/2020     07/12/2020    CO2 26 07/12/2020         1. Type 2 diabetes mellitus without complication, without long-term current use of insulin ([facility])  AIC 7.6 to 9.1  Goal <7  Add actos  Diet & wt loss discussed  - blood glucose monitor strips; Dispense glucose strips covered by insurance and compatible with her glucose monitor  AIC 9.1  Test glucose twice a day  Dispense: 200 strip; Refill: 5  - POCT glycosylated hemoglobin (Hb A1C)  - MICROALBUMIN / CREATININE URINE RATIO; Future  - Dulaglutide (TRULICITY) 9.59 TF/8.9JO SOPN; ADMINISTER 0.5 ML UNDER THE SKIN 1 TIME WEEKLY  Dispense: 2 mL; Refill: 5  - glimepiride (AMARYL) 4 MG tablet; TAKE 1 TABLET BY MOUTH EVERY MORNING  Dispense: 90 tablet; Refill: 1  - pioglitazone (ACTOS) 30 MG tablet; Take 1 tablet by mouth daily  Dispense: 30 tablet; Refill: 3    2. Mixed hyperlipidemia    - Lipid, Fasting; Future  - AST; Future  - AST;  Future

## 2021-01-27 RX ORDER — MINOCYCLINE HYDROCHLORIDE 50 MG/1
50 CAPSULE ORAL 2 TIMES DAILY
Qty: 60 CAPSULE | Refills: 1 | Status: SHIPPED | OUTPATIENT
Start: 2021-01-27 | End: 2021-08-17 | Stop reason: SDUPTHER

## 2021-01-29 ENCOUNTER — TELEPHONE (OUTPATIENT)
Dept: PRIMARY CARE CLINIC | Age: 66
End: 2021-01-29

## 2021-01-29 NOTE — TELEPHONE ENCOUNTER
Patient states that she needs refills of pantoprazole and amlodipine sent to [facility] in [facility].

## 2021-02-02 DIAGNOSIS — K21.9 GASTROESOPHAGEAL REFLUX DISEASE WITHOUT ESOPHAGITIS: ICD-10-CM

## 2021-02-02 RX ORDER — AMLODIPINE BESYLATE 5 MG/1
TABLET ORAL
Qty: 90 TABLET | Refills: 2 | Status: SHIPPED | OUTPATIENT
Start: 2021-02-02 | End: 2021-06-02 | Stop reason: SDUPTHER

## 2021-02-02 RX ORDER — PANTOPRAZOLE SODIUM 40 MG/1
TABLET, DELAYED RELEASE ORAL
Qty: 90 TABLET | Refills: 2 | Status: SHIPPED | OUTPATIENT
Start: 2021-02-02 | End: 2021-11-11

## 2021-02-02 NOTE — TELEPHONE ENCOUNTER
Medication:   Requested Prescriptions     Pending Prescriptions Disp Refills    amLODIPine (NORVASC) 5 MG tablet 90 tablet 2        Last Filled:      Patient Phone Number: [phone] (home) [phone] (work)    Last appt: 1/22/2021   Next appt: 4/23/2021    Last OARRS:   RX Monitoring 7/28/2015   Periodic Controlled Substance Monitoring No signs of potential drug abuse or diversion identified.

## 2021-02-07 ENCOUNTER — TELEPHONE (OUTPATIENT)
Dept: PRIMARY CARE CLINIC | Age: 66
End: 2021-02-07

## 2021-02-07 DIAGNOSIS — M25.559 ACUTE HIP PAIN, UNSPECIFIED LATERALITY: Primary | ICD-10-CM

## 2021-02-07 RX ORDER — OXYCODONE HYDROCHLORIDE AND ACETAMINOPHEN 5; 325 MG/1; MG/1
1 TABLET ORAL EVERY 6 HOURS PRN
Qty: 12 TABLET | Refills: 0 | Status: SHIPPED | OUTPATIENT
Start: 2021-02-07 | End: 2021-02-10

## 2021-02-15 ENCOUNTER — HOSPITAL ENCOUNTER (OUTPATIENT)
Dept: NUCLEAR MEDICINE | Age: 66
Discharge: HOME | End: 2021-02-15
Payer: COMMERCIAL

## 2021-02-15 DIAGNOSIS — Z96.641 PRESENCE OF RIGHT ARTIFICIAL HIP JOINT: ICD-10-CM

## 2021-02-15 PROCEDURE — 3430000000 HC RX DIAGNOSTIC RADIOPHARMACEUTICAL: Performed by: PHYSICIAN ASSISTANT

## 2021-02-15 PROCEDURE — 78315 BONE IMAGING 3 PHASE: CPT

## 2021-02-15 PROCEDURE — A9503 TC99M MEDRONATE: HCPCS | Performed by: PHYSICIAN ASSISTANT

## 2021-02-15 RX ORDER — TC 99M MEDRONATE 20 MG/10ML
25 INJECTION, POWDER, LYOPHILIZED, FOR SOLUTION INTRAVENOUS
Status: COMPLETED | OUTPATIENT
Start: 2021-02-15 | End: 2021-02-15

## 2021-02-15 RX ADMIN — TC 99M MEDRONATE 25 MILLICURIE: 20 INJECTION, POWDER, LYOPHILIZED, FOR SOLUTION INTRAVENOUS at 08:53

## 2021-03-15 RX ORDER — ATORVASTATIN CALCIUM 20 MG/1
TABLET, FILM COATED ORAL
Qty: 90 TABLET | Refills: 3 | Status: SHIPPED | OUTPATIENT
Start: 2021-03-15 | End: 2021-06-02 | Stop reason: SDUPTHER

## 2021-03-17 RX ORDER — VALSARTAN AND HYDROCHLOROTHIAZIDE 320; 25 MG/1; MG/1
1 TABLET, FILM COATED ORAL DAILY
Qty: 90 TABLET | Refills: 1 | Status: SHIPPED | OUTPATIENT
Start: 2021-03-17 | End: 2021-09-24 | Stop reason: SDUPTHER

## 2021-04-19 RX ORDER — ALBUTEROL SULFATE 90 UG/1
2 AEROSOL, METERED RESPIRATORY (INHALATION) EVERY 6 HOURS PRN
Qty: 1 INHALER | Refills: 5 | Status: SHIPPED | OUTPATIENT
Start: 2021-04-19

## 2021-04-19 NOTE — TELEPHONE ENCOUNTER
Medication:   Requested Prescriptions     Pending Prescriptions Disp Refills    albuterol sulfate HFA (PROVENTIL HFA) 108 (90 Base) MCG/ACT inhaler 1 Inhaler 3     Sig: Inhale 2 puffs into the lungs every 6 hours as needed for Wheezing        Last Filled:      Patient Phone Number: [phone] (home) [phone] (work)    Last appt: 1/22/2021   Next appt: 4/23/2021    Last OARRS:   RX Monitoring 7/28/2015   Periodic Controlled Substance Monitoring No signs of potential drug abuse or diversion identified.

## 2021-04-23 ENCOUNTER — OFFICE VISIT (OUTPATIENT)
Dept: PRIMARY CARE CLINIC | Age: 66
End: 2021-04-23
Payer: COMMERCIAL

## 2021-04-23 ENCOUNTER — HOSPITAL ENCOUNTER (INPATIENT)
Age: 66
LOS: 4 days | Discharge: HOME | DRG: 243 | End: 2021-04-27
Attending: EMERGENCY MEDICINE | Admitting: HOSPITALIST
Payer: COMMERCIAL

## 2021-04-23 ENCOUNTER — APPOINTMENT (OUTPATIENT)
Dept: GENERAL RADIOLOGY | Age: 66
DRG: 243 | End: 2021-04-23
Payer: COMMERCIAL

## 2021-04-23 VITALS
SYSTOLIC BLOOD PRESSURE: 142 MMHG | DIASTOLIC BLOOD PRESSURE: 67 MMHG | BODY MASS INDEX: 46.41 KG/M2 | TEMPERATURE: 97.2 F | HEART RATE: 87 BPM | WEIGHT: 262 LBS | OXYGEN SATURATION: 96 %

## 2021-04-23 DIAGNOSIS — I44.2 COMPLETE HEART BLOCK (HCC): Primary | ICD-10-CM

## 2021-04-23 DIAGNOSIS — R07.9 CHEST PAIN, UNSPECIFIED TYPE: ICD-10-CM

## 2021-04-23 DIAGNOSIS — N17.9 AKI (ACUTE KIDNEY INJURY) (HCC): ICD-10-CM

## 2021-04-23 DIAGNOSIS — R07.9 CHEST PAIN, UNSPECIFIED TYPE: Primary | ICD-10-CM

## 2021-04-23 DIAGNOSIS — I45.9 HEART BLOCK: ICD-10-CM

## 2021-04-23 LAB
ANION GAP SERPL CALCULATED.3IONS-SCNC: 11 MMOL/L (ref 3–16)
BASOPHILS ABSOLUTE: 0.1 K/UL (ref 0–0.2)
BASOPHILS RELATIVE PERCENT: 0.8 %
BUN BLDV-MCNC: 58 MG/DL (ref 7–20)
CALCIUM SERPL-MCNC: 9.4 MG/DL (ref 8.3–10.6)
CHLORIDE BLD-SCNC: 99 MMOL/L (ref 99–110)
CO2: 28 MMOL/L (ref 21–32)
CREAT SERPL-MCNC: 2.4 MG/DL (ref 0.6–1.2)
EOSINOPHILS ABSOLUTE: 0.2 K/UL (ref 0–0.6)
EOSINOPHILS RELATIVE PERCENT: 2.8 %
GFR AFRICAN AMERICAN: 24
GFR NON-AFRICAN AMERICAN: 20
GLUCOSE BLD-MCNC: 103 MG/DL (ref 70–99)
GLUCOSE BLD-MCNC: 92 MG/DL (ref 70–99)
HCT VFR BLD CALC: 37.5 % (ref 36–48)
HEMOGLOBIN: 12.5 G/DL (ref 12–16)
LYMPHOCYTES ABSOLUTE: 1.6 K/UL (ref 1–5.1)
LYMPHOCYTES RELATIVE PERCENT: 19.3 %
MAGNESIUM: 2.5 MG/DL (ref 1.8–2.4)
MCH RBC QN AUTO: 29.3 PG (ref 26–34)
MCHC RBC AUTO-ENTMCNC: 33.2 G/DL (ref 31–36)
MCV RBC AUTO: 88.2 FL (ref 80–100)
MONOCYTES ABSOLUTE: 1.2 K/UL (ref 0–1.3)
MONOCYTES RELATIVE PERCENT: 14.4 %
NEUTROPHILS ABSOLUTE: 5.2 K/UL (ref 1.7–7.7)
NEUTROPHILS RELATIVE PERCENT: 62.7 %
PDW BLD-RTO: 15.8 % (ref 12.4–15.4)
PERFORMED ON: NORMAL
PLATELET # BLD: 189 K/UL (ref 135–450)
PMV BLD AUTO: 10.4 FL (ref 5–10.5)
POTASSIUM REFLEX MAGNESIUM: 4.3 MMOL/L (ref 3.5–5.1)
RBC # BLD: 4.26 M/UL (ref 4–5.2)
SODIUM BLD-SCNC: 138 MMOL/L (ref 136–145)
TROPONIN: <0.01 NG/ML
WBC # BLD: 8.3 K/UL (ref 4–11)

## 2021-04-23 PROCEDURE — 36415 COLL VENOUS BLD VENIPUNCTURE: CPT

## 2021-04-23 PROCEDURE — 84484 ASSAY OF TROPONIN QUANT: CPT

## 2021-04-23 PROCEDURE — 85025 COMPLETE CBC W/AUTO DIFF WBC: CPT

## 2021-04-23 PROCEDURE — 71046 X-RAY EXAM CHEST 2 VIEWS: CPT

## 2021-04-23 PROCEDURE — 99284 EMERGENCY DEPT VISIT MOD MDM: CPT

## 2021-04-23 PROCEDURE — 83735 ASSAY OF MAGNESIUM: CPT

## 2021-04-23 PROCEDURE — 99214 OFFICE O/P EST MOD 30 MIN: CPT | Performed by: FAMILY MEDICINE

## 2021-04-23 PROCEDURE — 2580000003 HC RX 258: Performed by: NURSE PRACTITIONER

## 2021-04-23 PROCEDURE — 2580000003 HC RX 258: Performed by: HOSPITALIST

## 2021-04-23 PROCEDURE — 2000000000 HC ICU R&B

## 2021-04-23 PROCEDURE — 93000 ELECTROCARDIOGRAM COMPLETE: CPT | Performed by: FAMILY MEDICINE

## 2021-04-23 PROCEDURE — 93005 ELECTROCARDIOGRAM TRACING: CPT | Performed by: NURSE PRACTITIONER

## 2021-04-23 PROCEDURE — 96360 HYDRATION IV INFUSION INIT: CPT

## 2021-04-23 PROCEDURE — 6370000000 HC RX 637 (ALT 250 FOR IP): Performed by: NURSE PRACTITIONER

## 2021-04-23 PROCEDURE — 80048 BASIC METABOLIC PNL TOTAL CA: CPT

## 2021-04-23 RX ORDER — SODIUM CHLORIDE 0.9 % (FLUSH) 0.9 %
5-40 SYRINGE (ML) INJECTION EVERY 12 HOURS SCHEDULED
Status: DISCONTINUED | OUTPATIENT
Start: 2021-04-23 | End: 2021-04-26

## 2021-04-23 RX ORDER — PANTOPRAZOLE SODIUM 40 MG/1
40 TABLET, DELAYED RELEASE ORAL DAILY
Status: DISCONTINUED | OUTPATIENT
Start: 2021-04-24 | End: 2021-04-27 | Stop reason: HOSPADM

## 2021-04-23 RX ORDER — ATORVASTATIN CALCIUM 20 MG/1
20 TABLET, FILM COATED ORAL DAILY
Status: DISCONTINUED | OUTPATIENT
Start: 2021-04-24 | End: 2021-04-27 | Stop reason: HOSPADM

## 2021-04-23 RX ORDER — FLUTICASONE PROPIONATE 50 MCG
2 SPRAY, SUSPENSION (ML) NASAL DAILY
Status: DISCONTINUED | OUTPATIENT
Start: 2021-04-24 | End: 2021-04-27 | Stop reason: HOSPADM

## 2021-04-23 RX ORDER — 0.9 % SODIUM CHLORIDE 0.9 %
1000 INTRAVENOUS SOLUTION INTRAVENOUS ONCE
Status: COMPLETED | OUTPATIENT
Start: 2021-04-23 | End: 2021-04-23

## 2021-04-23 RX ORDER — NITROGLYCERIN 0.4 MG/1
0.4 TABLET SUBLINGUAL EVERY 5 MIN PRN
Status: DISCONTINUED | OUTPATIENT
Start: 2021-04-23 | End: 2021-04-27 | Stop reason: HOSPADM

## 2021-04-23 RX ORDER — NICOTINE POLACRILEX 4 MG
15 LOZENGE BUCCAL PRN
Status: DISCONTINUED | OUTPATIENT
Start: 2021-04-23 | End: 2021-04-27 | Stop reason: HOSPADM

## 2021-04-23 RX ORDER — ALBUTEROL SULFATE 90 UG/1
2 AEROSOL, METERED RESPIRATORY (INHALATION) EVERY 6 HOURS PRN
Status: DISCONTINUED | OUTPATIENT
Start: 2021-04-23 | End: 2021-04-27 | Stop reason: HOSPADM

## 2021-04-23 RX ORDER — PROMETHAZINE HYDROCHLORIDE 25 MG/1
12.5 TABLET ORAL EVERY 6 HOURS PRN
Status: DISCONTINUED | OUTPATIENT
Start: 2021-04-23 | End: 2021-04-27 | Stop reason: HOSPADM

## 2021-04-23 RX ORDER — ASPIRIN 81 MG/1
81 TABLET ORAL DAILY
Status: DISCONTINUED | OUTPATIENT
Start: 2021-04-24 | End: 2021-04-27

## 2021-04-23 RX ORDER — SODIUM CHLORIDE 9 MG/ML
25 INJECTION, SOLUTION INTRAVENOUS PRN
Status: DISCONTINUED | OUTPATIENT
Start: 2021-04-23 | End: 2021-04-26 | Stop reason: SDUPTHER

## 2021-04-23 RX ORDER — AMLODIPINE BESYLATE 5 MG/1
5 TABLET ORAL DAILY
Status: DISCONTINUED | OUTPATIENT
Start: 2021-04-24 | End: 2021-04-27 | Stop reason: HOSPADM

## 2021-04-23 RX ORDER — SODIUM CHLORIDE 0.9 % (FLUSH) 0.9 %
5-40 SYRINGE (ML) INJECTION PRN
Status: DISCONTINUED | OUTPATIENT
Start: 2021-04-23 | End: 2021-04-26

## 2021-04-23 RX ORDER — POLYETHYLENE GLYCOL 3350 17 G/17G
17 POWDER, FOR SOLUTION ORAL DAILY PRN
Status: DISCONTINUED | OUTPATIENT
Start: 2021-04-23 | End: 2021-04-27 | Stop reason: HOSPADM

## 2021-04-23 RX ORDER — MELOXICAM 15 MG/1
15 TABLET ORAL DAILY
COMMUNITY
Start: 2021-02-09 | End: 2021-06-02

## 2021-04-23 RX ORDER — SODIUM CHLORIDE 9 MG/ML
INJECTION, SOLUTION INTRAVENOUS CONTINUOUS
Status: DISCONTINUED | OUTPATIENT
Start: 2021-04-23 | End: 2021-04-27

## 2021-04-23 RX ORDER — ACETAMINOPHEN 650 MG/1
650 SUPPOSITORY RECTAL EVERY 6 HOURS PRN
Status: DISCONTINUED | OUTPATIENT
Start: 2021-04-23 | End: 2021-04-26 | Stop reason: SDUPTHER

## 2021-04-23 RX ORDER — DEXTROSE MONOHYDRATE 50 MG/ML
100 INJECTION, SOLUTION INTRAVENOUS PRN
Status: DISCONTINUED | OUTPATIENT
Start: 2021-04-23 | End: 2021-04-27 | Stop reason: HOSPADM

## 2021-04-23 RX ORDER — ONDANSETRON 2 MG/ML
4 INJECTION INTRAMUSCULAR; INTRAVENOUS EVERY 6 HOURS PRN
Status: DISCONTINUED | OUTPATIENT
Start: 2021-04-23 | End: 2021-04-27 | Stop reason: HOSPADM

## 2021-04-23 RX ORDER — ACETAMINOPHEN 325 MG/1
650 TABLET ORAL EVERY 6 HOURS PRN
Status: DISCONTINUED | OUTPATIENT
Start: 2021-04-23 | End: 2021-04-26 | Stop reason: SDUPTHER

## 2021-04-23 RX ORDER — DEXTROSE MONOHYDRATE 25 G/50ML
12.5 INJECTION, SOLUTION INTRAVENOUS PRN
Status: DISCONTINUED | OUTPATIENT
Start: 2021-04-23 | End: 2021-04-27 | Stop reason: HOSPADM

## 2021-04-23 RX ORDER — ASPIRIN 81 MG/1
324 TABLET, CHEWABLE ORAL ONCE
Status: COMPLETED | OUTPATIENT
Start: 2021-04-23 | End: 2021-04-23

## 2021-04-23 RX ADMIN — ASPIRIN 324 MG: 81 TABLET, CHEWABLE ORAL at 17:59

## 2021-04-23 RX ADMIN — SODIUM CHLORIDE: 9 INJECTION, SOLUTION INTRAVENOUS at 23:23

## 2021-04-23 RX ADMIN — SODIUM CHLORIDE 1000 ML: 9 INJECTION, SOLUTION INTRAVENOUS at 18:54

## 2021-04-23 RX ADMIN — SODIUM CHLORIDE 1000 ML: 9 INJECTION, SOLUTION INTRAVENOUS at 20:09

## 2021-04-23 ASSESSMENT — PAIN DESCRIPTION - LOCATION
LOCATION: CHEST
LOCATION: CHEST

## 2021-04-23 ASSESSMENT — PAIN DESCRIPTION - FREQUENCY: FREQUENCY: CONTINUOUS

## 2021-04-23 ASSESSMENT — PAIN SCALES - GENERAL
PAINLEVEL_OUTOF10: 5
PAINLEVEL_OUTOF10: 4

## 2021-04-23 ASSESSMENT — PAIN DESCRIPTION - PAIN TYPE
TYPE: ACUTE PAIN
TYPE: ACUTE PAIN

## 2021-04-23 NOTE — PROGRESS NOTES
Medication Reconciliation     List of medications patient is currently taking is complete. Source of information:   1. Conversation with patient at bedside  2. EPIC records        Notes regarding home medications:  1. Patient did not take any meds today. 2. Takes all medications before bed.  BID meds she typically just takes once daily at night given her work schedule        Tommie Sanchez, Pharmacy Intern  4/23/2021 7:26 PM

## 2021-04-23 NOTE — ED PROVIDER NOTES
[facility] Emergency Department    CHIEF COMPLAINT  Chest Pain (abnormal EKG at PCP office )      [facility]  I have seen and evaluated this patient with my supervising physician, Dr. Adriana Acevedo. HISTORY OF PRESENT ILLNESS  Dony Aguayo is a 76 y.o. obese female with a history of diabetes mellitus, hypertension, and hyperlipidemia who presents to the ED sent by her PCPs office for an abnormal EKG which was performed after she endorsed a 1 week history of exertional shortness of breath and \"tight\" 4-5/10 substernal chest pain with radiation into her right neck since Wednesday also with exertion. She does have the tightness presently. Accompanying symptoms include postprandial nausea, dizziness, presyncope, cough productive of yellowish phlegm, and chills. Denies ripping or tearing sensation, vomiting, fevers, hemoptysis, leg/calf pain or swelling, abdominal pain, diarrhea, or urinary symptoms. No other complaints, modifying factors or associated symptoms. Nursing notes reviewed.    Past Medical History:   Diagnosis Date    Diabetes mellitus ([facility] [address]     GERD (gastroesophageal reflux disease)     Hyperlipidemia     Hypertension     Obesity     S/P hip replacement     Sleep apnea      Past Surgical History:   Procedure Laterality Date    JOINT REPLACEMENT      TOTAL HIP ARTHROPLASTY Left Oct '13     Family History   Problem Relation Age of Onset    Heart Disease Mother      Social History     Socioeconomic History    Marital status:      Spouse name: Not on file    Number of children: Not on file    Years of education: Not on file    Highest education level: Not on file   Occupational History    Not on file   Social Needs    Financial resource strain: Not on file    Food insecurity     Worry: Not on file     Inability: Not on file    Transportation needs     Medical: Not on file     Non-medical: Not on file   Tobacco Use    Smoking status: Never Smoker    Smokeless tobacco: Never Used   Substance and Sexual Activity    Alcohol use: No    Drug use: No    Sexual activity: Yes     Partners: Male   Lifestyle    Physical activity     Days per week: Not on file     Minutes per session: Not on file    Stress: Not on file   Relationships    Social connections     Talks on phone: Not on file     Gets together: Not on file     Attends Taoism service: Not on file     Active member of club or organization: Not on file     Attends meetings of clubs or organizations: Not on file     Relationship status: Not on file    Intimate partner violence     Fear of current or ex partner: Not on file     Emotionally abused: Not on file     Physically abused: Not on file     Forced sexual activity: Not on file   Other Topics Concern    Not on file   Social History Narrative    Not on file     No current facility-administered medications for this encounter.       Current Outpatient Medications   Medication Sig Dispense Refill    albuterol sulfate HFA (PROVENTIL HFA) 108 (90 Base) MCG/ACT inhaler Inhale 2 puffs into the lungs every 6 hours as needed for Wheezing 1 Inhaler 5    valsartan-hydroCHLOROthiazide (DIOVAN-HCT) 320-25 MG per tablet Take 1 tablet by mouth daily 90 tablet 1    atorvastatin (LIPITOR) 20 MG tablet TAKE ONE TABLET BY MOUTH DAILY 90 tablet 3    metoprolol tartrate (LOPRESSOR) 25 MG tablet TAKE 1 TABLET BY MOUTH TWICE DAILY 180 tablet 1    amLODIPine (NORVASC) 5 MG tablet TAKE 1 TABLET BY MOUTH DAILY 90 tablet 2    pantoprazole (PROTONIX) 40 MG tablet TAKE 1 TABLET BY MOUTH DAILY 90 tablet 2    minocycline (MINOCIN;DYNACIN) 50 MG capsule Take 1 capsule by mouth 2 times daily 60 capsule 1    pioglitazone (ACTOS) 30 MG tablet Take 1 tablet by mouth daily 30 tablet 5    blood glucose monitor strips Dispense glucose strips covered by insurance and compatible with her glucose monitor  AIC 8.2  Test glucose twice a day 200 strip 5    canagliflozin-metformin HCl (INVOKAMET) 150-500 MG TAKE 1 TABLET BY MOUTH TWICE DAILY WITH MEALS 180 tablet 2    Dulaglutide (TRULICITY) 5.11 OI/2.7MZ SOPN ADMINISTER 0.5 ML UNDER THE SKIN 1 TIME WEEKLY 2 mL 5    glimepiride (AMARYL) 4 MG tablet TAKE 1 TABLET BY MOUTH EVERY MORNING 90 tablet 1    cloNIDine (CATAPRES) 0.2 MG tablet TAKE 1 TABLET BY MOUTH TWICE DAILY 180 tablet 1    oxaprozin (DAYPRO) 600 MG tablet TAKE 1 TABLET BY MOUTH EVERY DAY 60 tablet 3    estradiol (ESTRING) 2 MG vaginal ring Place 2 mg vaginally      aspirin 81 MG EC tablet Take 1 tablet by mouth daily 30 tablet 0    tacrolimus (PROTOPIC) 0.1 % ointment Apply topically to the face 2 times daily if needed for rosacea. 30 g 0    clobetasol (TEMOVATE) 0.05 % ointment Apply to affected area on the left thigh twice daily for up to 2 weeks or until improved. 45 g 2    mometasone (NASONEX) 50 MCG/ACT nasal spray SHAKE WELL AND USE 2 SPRAYS NASALLY DAILY 34 g 5     Allergies   Allergen Reactions    Codeine Itching    Penicillins Itching       REVIEW OF SYSTEMS  10 systems reviewed, pertinent positives per HPI otherwise noted to be negative    PHYSICAL EXAM  Pulse (!) 42   Temp 99.1 °F (37.3 °C) (Oral)   Resp 28   Wt 266 lb 15.6 oz (121.1 kg)   SpO2 99%   BMI 47.29 kg/m²   GENERAL APPEARANCE: Awake and alert. Cooperative.  + Distress. Non-- toxic in appearance. HEAD: Normocephalic. Atraumatic. EYES: PERRL. EOM's grossly intact. ENT: Mucous membranes are moist.   NECK: Supple. There is no meningismus. HEART: . No murmurs. LUNGS: Respirations unlabored. CTAB. Good air exchange. Speaking comfortably in full sentences. ABDOMEN: Soft. Non-distended. Non-tender. No guarding or rebound. There is no midline pulsatile abdominal mass. No masses. No organomegaly. EXTREMITIES: Trace peripheral edema. Moves all extremities equally. All extremities neurovascularly intact. Radial pulse equal bilaterally. SKIN: Warm and dry.  No acute rashes. NEUROLOGICAL: Alert and oriented. CN's 2-12 intact. No gross facial drooping. Strength 5/5, sensation intact. PSYCHIATRIC: Normal mood and affect. RADIOLOGY  No results found. ED COURSE  Patient received asa 324 mg for pain, with good relief. Triage vitals stable. Cardiac workup was initiated to include basic/cardiac labs, CXR, and EKG.       Labs Ordered:  I have reviewed and interpreted all of the currently available lab results from this visit:  Results for orders placed or performed during the hospital encounter of 04/23/21   CBC Auto Differential   Result Value Ref Range    WBC 8.3 4.0 - 11.0 K/uL    RBC 4.26 4.00 - 5.20 M/uL    Hemoglobin 12.5 12.0 - 16.0 g/dL    Hematocrit 37.5 36.0 - 48.0 %    MCV 88.2 80.0 - 100.0 fL    MCH 29.3 26.0 - 34.0 pg    MCHC 33.2 31.0 - 36.0 g/dL    RDW 15.8 (H) 12.4 - 15.4 %    Platelets 221 508 - 233 K/uL    MPV 10.4 5.0 - 10.5 fL    Neutrophils % 62.7 %    Lymphocytes % 19.3 %    Monocytes % 14.4 %    Eosinophils % 2.8 %    Basophils % 0.8 %    Neutrophils Absolute 5.2 1.7 - 7.7 K/uL    Lymphocytes Absolute 1.6 1.0 - 5.1 K/uL    Monocytes Absolute 1.2 0.0 - 1.3 K/uL    Eosinophils Absolute 0.2 0.0 - 0.6 K/uL    Basophils Absolute 0.1 0.0 - 0.2 K/uL   Basic Metabolic Panel w/ Reflex to MG   Result Value Ref Range    Sodium 138 136 - 145 mmol/L    Potassium reflex Magnesium 4.3 3.5 - 5.1 mmol/L    Chloride 99 99 - 110 mmol/L    CO2 28 21 - 32 mmol/L    Anion Gap 11 3 - 16    Glucose 103 (H) 70 - 99 mg/dL    BUN 58 (H) 7 - 20 mg/dL    CREATININE 2.4 (H) 0.6 - 1.2 mg/dL    GFR Non-African American 20 (A) >60    GFR  24 (A) >60    Calcium 9.4 8.3 - 10.6 mg/dL   Troponin   Result Value Ref Range    Troponin <0.01 <0.01 ng/mL   Magnesium   Result Value Ref Range    Magnesium 2.50 (H) 1.80 - 2.40 mg/dL   POCT Glucose   Result Value Ref Range    POC Glucose 92 70 - 99 mg/dl    Performed on ACCU-CHEK    EKG 12 Lead   Result Value Ref Range Ventricular Rate 38 BPM    Atrial Rate 38 BPM    QRS Duration 78 ms    Q-T Interval 474 ms    QTc Calculation (Bazett) 376 ms    P Axis 55 degrees    R Axis -9 degrees    T Axis 33 degrees    Diagnosis       Marked sinus bradycardia with A-V dissociation and Junctional bradycardia with Sinus/atrial captureAbnormal ECGWhen compared with ECG of 12-JUL-2020 05:27,Junctional rhythm has replaced Sinus rhythmVent. rate has decreased BY  34 BPMIncomplete right bundle branch block is no longer PresentST now depressed in Anterior leadsQT has shortened             Imaging ordered:  Xr Chest (2 Vw)    Result Date: 4/23/2021  EXAMINATION: TWO XRAY VIEWS OF THE CHEST 4/23/2021 5:48 pm COMPARISON: 01/30/2018 HISTORY: ORDERING SYSTEM PROVIDED HISTORY: Chest Pain TECHNOLOGIST PROVIDED HISTORY: Reason for exam:->Chest Pain Reason for Exam: chest pain FINDINGS: Frontal and lateral views of the chest were performed. There is no acute skeletal abnormality. There is diffuse idiopathic skeletal hyperostosis. The heart size is stable, and at the upper limits of normal.  Mediastinal contours are within normal limits. Lungs remain clear, without evidence of acute airspace consolidation, pneumothorax, or pleural effusion. There is stable nonspecific elevation of the right hemidiaphragm. No acute cardiopulmonary disease. Interventions:  Patient received a cardiac dose of  mg p.o. Nitro was not given. .       Reevaluation:  Patient resting comfortably on stretcher with eyes open with stable vital signs. A discussion was had with Mrs. Kimberlyn Dempsey regarding complete heart block, chest pain, ERIK, and intention to admit. .  Risk management discussed and shared decision making had with patient and/or surrogate. All questions were answered patient is agreeable with plan for admission. CRITICAL CARE TIME  0 Minutes of critical care time spent not including separately billable procedures.     [facility]  Patient presents to the emergency department with chest pain. Alternate diagnoses are less likely based on history and physical. Considered myocardial infarction, aortic dissection, pulmonary embolism, tension pneumothorax, endocarditis, GERD, and pneumonia but less likely based on history and physical. Considered MI but no ischemic changes on EKG and no elevation in troponin. Considered aortic dissection but less likely as no radiation of pain into back with ripping/tearing sensation, there are equal radial pulses bilaterally, and there is no midline pulsatile abdominal mass. Considered pulmonary embolism but less likely as no cough or hemoptysis, no DVT symptoms,  Considered tension pneumothorax but less likely as no unilaterally diminished breath sounds or tracheal deviation. Considered  endocarditis but less likely as no fever, murmur, or IV drug use. Considered GERD but less likely as no postprandial epigastric abdominal/throat burning. Considered pneumonia but less likely as no fever, chills, sweats, leukocytosis, cough, and no radiographic evidence of consolidation or infiltrates on imaging-CXR. Work-up reveals:  Reason elevated at 2.5  Magnesium: <0.01  BMP: Glucose 103, BUN 28, creatinine 2.4, GFR 24  CBC: Negative for leukocytosis or anemia with RDW 15.8 but otherwise unremarkable. EKG: Marked sinus bradycardia with AV disassociation and junctional bradycardia with sinus/atrial capture junctional rhythm has replaced sinus rhythm. Ventricular rate has decreased 34 bpm.  Incomplete right heart is no longer present ST depression in anterior leads. QT has shortened. CXR: No acute cardiopulmonary disease      Consulted Dr. Axel Buitrago - cardioplogy. Discussed patient's HPI, ED work-up, results, and treatment. Dr. Axel Buitrago recommends IV hydration, holding beta-blockers and calcium channel blockers with the exception of amlodipine.   He does not endorse transcutaneous pacing at this time as the patient is maintaining her blood

## 2021-04-23 NOTE — PROGRESS NOTES
77 y/o female c/o some chest discomfort/tightness onset past weekend. Some sob also. Seems worse when walking and bending over. Not present  At rest. Pain does mildly rad to neck but not arms. Ears are popping. Did have sl cough at times including today. No fever. No sick family members    She did have neg cardiac cath 2018. Physical Exam  Constitutional:       General: She is not in acute distress. Appearance: She is well-developed. She is not diaphoretic. HENT:      Head: Normocephalic and atraumatic. Right Ear: External ear normal.      Left Ear: External ear normal.      Nose: Nose normal.      Mouth/Throat:      Pharynx: No oropharyngeal exudate. Eyes:      General: No scleral icterus. Left eye: No discharge. Conjunctiva/sclera: Conjunctivae normal.      Pupils: Pupils are equal, round, and reactive to light. Neck:      Musculoskeletal: Normal range of motion and neck supple. Thyroid: No thyromegaly. Vascular: No JVD. Trachea: No tracheal deviation. Cardiovascular:      Rate and Rhythm: Normal rate and regular rhythm. Heart sounds: Normal heart sounds. No murmur. No friction rub. No gallop. Pulmonary:      Effort: Pulmonary effort is normal. No respiratory distress. Breath sounds: Normal breath sounds. No stridor. No wheezing or rales. Chest:      Chest wall: No tenderness. Abdominal:      General: Bowel sounds are normal. There is no distension. Palpations: Abdomen is soft. There is no mass. Tenderness: There is no abdominal tenderness. There is no guarding or rebound. Musculoskeletal: Normal range of motion. General: No tenderness. Lymphadenopathy:      Cervical: No cervical adenopathy. Skin:     General: Skin is warm and dry. Coloration: Skin is not pale. Findings: No erythema or rash. Neurological:      Mental Status: She is alert and oriented to person, place, and time. Cranial Nerves:  No cranial nerve deficit. Coordination: Coordination normal.      Deep Tendon Reflexes: Reflexes are normal and symmetric. Reflexes normal.   Psychiatric:         Behavior: Behavior normal.         Thought Content: Thought content normal.         Judgment: Judgment normal.       1. Chest pain, unspecified type    Atypical   Previous card cath neg      2 .  Heart block  ekg suggests complete HB      Needs to be transported  to ER

## 2021-04-23 NOTE — ED NOTES
Introduced self to patient and family. Patient denies needs at this time. Will continue to monitor patient closely.      Alycia Gonzalez RN  04/23/21 1992

## 2021-04-23 NOTE — ED NOTES
Bed: A-16  Expected date:   Expected time:   Means of arrival: [facility]  Comments:  65F heart block     Jaylen Painting RN  04/23/21 [id number]

## 2021-04-24 LAB
ANION GAP SERPL CALCULATED.3IONS-SCNC: 9 MMOL/L (ref 3–16)
BASOPHILS ABSOLUTE: 0 K/UL (ref 0–0.2)
BASOPHILS RELATIVE PERCENT: 0.6 %
BILIRUBIN URINE: NEGATIVE
BLOOD, URINE: NEGATIVE
BUN BLDV-MCNC: 46 MG/DL (ref 7–20)
CALCIUM SERPL-MCNC: 8.5 MG/DL (ref 8.3–10.6)
CHLORIDE BLD-SCNC: 105 MMOL/L (ref 99–110)
CLARITY: CLEAR
CO2: 25 MMOL/L (ref 21–32)
COLOR: YELLOW
CREAT SERPL-MCNC: 1.6 MG/DL (ref 0.6–1.2)
CREATININE URINE: 64.4 MG/DL (ref 28–259)
EKG ATRIAL RATE: 38 BPM
EKG DIAGNOSIS: NORMAL
EKG P AXIS: 55 DEGREES
EKG Q-T INTERVAL: 474 MS
EKG QRS DURATION: 78 MS
EKG QTC CALCULATION (BAZETT): 376 MS
EKG R AXIS: -9 DEGREES
EKG T AXIS: 33 DEGREES
EKG VENTRICULAR RATE: 38 BPM
EOSINOPHILS ABSOLUTE: 0.3 K/UL (ref 0–0.6)
EOSINOPHILS RELATIVE PERCENT: 4.3 %
ESTIMATED AVERAGE GLUCOSE: 162.8 MG/DL
GFR AFRICAN AMERICAN: 39
GFR NON-AFRICAN AMERICAN: 32
GLUCOSE BLD-MCNC: 100 MG/DL (ref 70–99)
GLUCOSE BLD-MCNC: 80 MG/DL (ref 70–99)
GLUCOSE BLD-MCNC: 81 MG/DL (ref 70–99)
GLUCOSE BLD-MCNC: 85 MG/DL (ref 70–99)
GLUCOSE BLD-MCNC: 87 MG/DL (ref 70–99)
GLUCOSE BLD-MCNC: 91 MG/DL (ref 70–99)
GLUCOSE URINE: 500 MG/DL
HBA1C MFR BLD: 7.3 %
HCT VFR BLD CALC: 33.3 % (ref 36–48)
HEMOGLOBIN: 10.8 G/DL (ref 12–16)
KETONES, URINE: NEGATIVE MG/DL
LEUKOCYTE ESTERASE, URINE: NEGATIVE
LYMPHOCYTES ABSOLUTE: 1.6 K/UL (ref 1–5.1)
LYMPHOCYTES RELATIVE PERCENT: 24.5 %
MCH RBC QN AUTO: 28.6 PG (ref 26–34)
MCHC RBC AUTO-ENTMCNC: 32.3 G/DL (ref 31–36)
MCV RBC AUTO: 88.4 FL (ref 80–100)
MICROSCOPIC EXAMINATION: ABNORMAL
MONOCYTES ABSOLUTE: 0.9 K/UL (ref 0–1.3)
MONOCYTES RELATIVE PERCENT: 14.6 %
NEUTROPHILS ABSOLUTE: 3.6 K/UL (ref 1.7–7.7)
NEUTROPHILS RELATIVE PERCENT: 56 %
NITRITE, URINE: NEGATIVE
PDW BLD-RTO: 15.8 % (ref 12.4–15.4)
PERFORMED ON: ABNORMAL
PERFORMED ON: NORMAL
PH UA: 5.5 (ref 5–8)
PLATELET # BLD: 165 K/UL (ref 135–450)
PMV BLD AUTO: 10.1 FL (ref 5–10.5)
POTASSIUM REFLEX MAGNESIUM: 4 MMOL/L (ref 3.5–5.1)
PROTEIN PROTEIN: 5 MG/DL
PROTEIN UA: NEGATIVE MG/DL
PROTEIN/CREAT RATIO: 0.1 MG/DL
RBC # BLD: 3.76 M/UL (ref 4–5.2)
SODIUM BLD-SCNC: 139 MMOL/L (ref 136–145)
SPECIFIC GRAVITY UA: 1.02 (ref 1–1.03)
TROPONIN: <0.01 NG/ML
URINE REFLEX TO CULTURE: ABNORMAL
URINE TYPE: ABNORMAL
UROBILINOGEN, URINE: 0.2 E.U./DL
WBC # BLD: 6.4 K/UL (ref 4–11)

## 2021-04-24 PROCEDURE — 6360000002 HC RX W HCPCS: Performed by: HOSPITALIST

## 2021-04-24 PROCEDURE — 6370000000 HC RX 637 (ALT 250 FOR IP): Performed by: HOSPITALIST

## 2021-04-24 PROCEDURE — 85025 COMPLETE CBC W/AUTO DIFF WBC: CPT

## 2021-04-24 PROCEDURE — 83036 HEMOGLOBIN GLYCOSYLATED A1C: CPT

## 2021-04-24 PROCEDURE — 99255 IP/OBS CONSLTJ NEW/EST HI 80: CPT | Performed by: INTERNAL MEDICINE

## 2021-04-24 PROCEDURE — 84156 ASSAY OF PROTEIN URINE: CPT

## 2021-04-24 PROCEDURE — 94760 N-INVAS EAR/PLS OXIMETRY 1: CPT

## 2021-04-24 PROCEDURE — 93010 ELECTROCARDIOGRAM REPORT: CPT | Performed by: INTERNAL MEDICINE

## 2021-04-24 PROCEDURE — 99223 1ST HOSP IP/OBS HIGH 75: CPT | Performed by: INTERNAL MEDICINE

## 2021-04-24 PROCEDURE — 2580000003 HC RX 258: Performed by: HOSPITALIST

## 2021-04-24 PROCEDURE — 2000000000 HC ICU R&B

## 2021-04-24 PROCEDURE — 81003 URINALYSIS AUTO W/O SCOPE: CPT

## 2021-04-24 PROCEDURE — 36415 COLL VENOUS BLD VENIPUNCTURE: CPT

## 2021-04-24 PROCEDURE — 82570 ASSAY OF URINE CREATININE: CPT

## 2021-04-24 PROCEDURE — 80048 BASIC METABOLIC PNL TOTAL CA: CPT

## 2021-04-24 PROCEDURE — 84484 ASSAY OF TROPONIN QUANT: CPT

## 2021-04-24 RX ADMIN — FLUTICASONE PROPIONATE 2 SPRAY: 50 SPRAY, METERED NASAL at 07:56

## 2021-04-24 RX ADMIN — SODIUM CHLORIDE, PRESERVATIVE FREE 10 ML: 5 INJECTION INTRAVENOUS at 07:49

## 2021-04-24 RX ADMIN — AMLODIPINE BESYLATE 5 MG: 5 TABLET ORAL at 08:30

## 2021-04-24 RX ADMIN — ENOXAPARIN SODIUM 30 MG: 30 INJECTION SUBCUTANEOUS at 08:02

## 2021-04-24 RX ADMIN — SODIUM CHLORIDE: 9 INJECTION, SOLUTION INTRAVENOUS at 13:00

## 2021-04-24 RX ADMIN — SODIUM CHLORIDE, PRESERVATIVE FREE 10 ML: 5 INJECTION INTRAVENOUS at 20:11

## 2021-04-24 RX ADMIN — ASPIRIN 81 MG: 81 TABLET, COATED ORAL at 08:01

## 2021-04-24 RX ADMIN — PANTOPRAZOLE SODIUM 40 MG: 40 TABLET, DELAYED RELEASE ORAL at 06:45

## 2021-04-24 RX ADMIN — ATORVASTATIN CALCIUM 20 MG: 20 TABLET, FILM COATED ORAL at 08:02

## 2021-04-24 ASSESSMENT — PAIN SCALES - GENERAL
PAINLEVEL_OUTOF10: 0
PAINLEVEL_OUTOF10: 0

## 2021-04-24 NOTE — CONSULTS
[facility]  Cardiology Consult    Antonia Parker  1955 April 24, 2021      Reason for Referral: Complete heart block    CC: Chest pain and dizziness      Subjective:     History of Present Illness:    Antonia Parker is a 76 y.o. patient with a PMH significant for diabetes mellitus, hyperlipidemia presented with complaints of chest pain dizziness. Patient complains of chest pain. Onset was 2 days ago, with unchanged course since that time. The patient describes the pain as intermittent, precordial in nature, does not radiate. Patient rates pain as a 5/10 in intensity. Associated symptoms are Dizziness. Aggravating factors are exercise. Alleviating factors are: rest. Patient's cardiac risk factors are none. Patient's risk factors for DVT/PE: none. Previous cardiac testing: none. Past Medical History:   has a past medical history of Diabetes mellitus ([address] GERD (gastroesophageal reflux disease), Hyperlipidemia, Hypertension, Obesity, S/P hip replacement, and Sleep apnea. Surgical History:   has a past surgical history that includes Total hip arthroplasty (Left, Oct '13) and joint replacement. Social History:   reports that she has never smoked. She has never used smokeless tobacco. She reports that she does not drink alcohol or use drugs. Family History:  family history includes Heart Disease in her mother. Home Medications:  Were reviewed and are listed in nursing record and/or below  Prior to Admission medications    Medication Sig Start Date End Date Taking?  Authorizing Provider   valsartan-hydroCHLOROthiazide (DIOVAN-HCT) 320-25 MG per tablet Take 1 tablet by mouth daily 3/17/21  Yes Mathew English MD   atorvastatin (LIPITOR) 20 MG tablet TAKE ONE TABLET BY MOUTH DAILY 3/15/21  Yes Mathew English MD   metoprolol tartrate (LOPRESSOR) 25 MG tablet TAKE 1 TABLET BY MOUTH TWICE DAILY  Patient taking differently: Take 50 mg by mouth nightly  3/15/21  Yes Mathew English MD amLODIPine (NORVASC) 5 MG tablet TAKE 1 TABLET BY MOUTH DAILY 2/2/21  Yes Roni Ravi MD   pantoprazole (PROTONIX) 40 MG tablet TAKE 1 TABLET BY MOUTH DAILY 2/2/21  Yes Roni Ravi MD   minocycline (MINOCIN;DYNACIN) 50 MG capsule Take 1 capsule by mouth 2 times daily 1/27/21  Yes Vi Buckley MD   pioglitazone (ACTOS) 30 MG tablet Take 1 tablet by mouth daily 1/24/21  Yes Roni Ravi MD   canagliflozin-metformin HCl (INVOKAMET) 150-500 MG TAKE 1 TABLET BY MOUTH TWICE DAILY WITH MEALS  Patient taking differently: TAKE 1 TABLET BY MOUTH EVERY NIGHT 1/22/21  Yes Roni Ravi MD   Dulaglutide (TRULICITY) 9.90 SX/5.5CE SOPN ADMINISTER 0.5 ML UNDER THE SKIN 1 TIME WEEKLY 1/22/21  Yes Roni Ravi MD   glimepiride (AMARYL) 4 MG tablet TAKE 1 TABLET BY MOUTH EVERY MORNING 1/22/21  Yes Roni Ravi MD   cloNIDine (CATAPRES) 0.2 MG tablet TAKE 1 TABLET BY MOUTH TWICE DAILY  Patient taking differently: Take 0.2 mg by mouth nightly  12/5/20  Yes Roni Ravi MD   oxaprozin (DAYPRO) 600 MG tablet TAKE 1 TABLET BY MOUTH EVERY DAY 9/1/20  Yes Roni Ravi MD   aspirin 81 MG EC tablet Take 1 tablet by mouth daily 10/18/19  Yes Francisco Ramirez MD   meloxicam (MOBIC) 15 MG tablet Take 15 mg by mouth daily 2/9/21   Historical Provider, MD   albuterol sulfate HFA (PROVENTIL HFA) 108 (90 Base) MCG/ACT inhaler Inhale 2 puffs into the lungs every 6 hours as needed for Wheezing 4/19/21   Roni Ravi MD   blood glucose monitor strips Dispense glucose strips covered by insurance and compatible with her glucose monitor  AIC 8.2  Test glucose twice a day 1/22/21   Roni Ravi MD   estradiol (ESTRING) 2 MG vaginal ring Place 2 mg vaginally 2/25/19   Historical Provider, MD   tacrolimus (PROTOPIC) 0.1 % ointment Apply topically to the face 2 times daily if needed for rosacea.  7/23/19   Vi Buckley MD   clobetasol (TEMOVATE) 0.05 % ointment Apply to affected area on the left thigh twice daily for up to 2 weeks or until improved. 5/3/18   Laverne Franklin MD   mometasone (NASONEX) 50 MCG/ACT nasal spray SHAKE WELL AND USE 2 SPRAYS NASALLY DAILY 10/4/17   Nic Waters MD        CURRENT Medications:  nitroGLYCERIN (NITROSTAT) SL tablet 0.4 mg, Q5 Min PRN  albuterol sulfate  (90 Base) MCG/ACT inhaler 2 puff, Q6H PRN  amLODIPine (NORVASC) tablet 5 mg, Daily  aspirin EC tablet 81 mg, Daily  atorvastatin (LIPITOR) tablet 20 mg, Daily  fluticasone (FLONASE) 50 MCG/ACT nasal spray 2 spray, Daily  pantoprazole (PROTONIX) tablet 40 mg, Daily  glucose (GLUTOSE) 40 % oral gel 15 g, PRN  dextrose 50 % IV solution, PRN  glucagon (rDNA) injection 1 mg, PRN  dextrose 5 % solution, PRN  insulin lispro (HUMALOG) injection vial 0-6 Units, TID WC  insulin lispro (HUMALOG) injection vial 0-3 Units, Nightly  sodium chloride flush 0.9 % injection 5-40 mL, 2 times per day  sodium chloride flush 0.9 % injection 5-40 mL, PRN  0.9 % sodium chloride infusion, PRN  enoxaparin (LOVENOX) injection 30 mg, Daily  promethazine (PHENERGAN) tablet 12.5 mg, Q6H PRN    Or  ondansetron (ZOFRAN) injection 4 mg, Q6H PRN  polyethylene glycol (GLYCOLAX) packet 17 g, Daily PRN  acetaminophen (TYLENOL) tablet 650 mg, Q6H PRN    Or  acetaminophen (TYLENOL) suppository 650 mg, Q6H PRN  0.9 % sodium chloride infusion, Continuous        Allergies:  Codeine and Penicillins           Review of Systems: SEE HPI   · Constitutional: no unanticipated weight loss. There's been no change in energy level, sleep pattern, or activity level. No fevers, chills. · Eyes: No visual changes or diplopia. No scleral icterus. · ENT: No Headaches, hearing loss or vertigo. No mouth sores or sore throat. · Cardiovascular:  Chest pain, tightness or discomfort.  No Shortness of breath. No Dyspnea on exertion, Orthopnea, Paroxysmal nocturnal dyspnea or breathlessness at rest.   No Palpitations.  No Syncope ('blackouts', 'faints', 'collapse') or dizziness.    · Respiratory: No performed, no rashes or lesions. Pysch: Normal mood and affect. Alert and oriented to time place person   Neurologic: Normal gross motor and sensory exam.       Labs     All labs have been reviewed    Lab Results   Component Value Date    WBC 6.4 04/24/2021    RBC 3.76 04/24/2021    HGB 10.8 04/24/2021    HCT 33.3 04/24/2021    MCV 88.4 04/24/2021    RDW 15.8 04/24/2021     04/24/2021     Lab Results   Component Value Date     04/24/2021    K 4.0 04/24/2021     04/24/2021    CO2 25 04/24/2021    BUN 46 04/24/2021    CREATININE 1.6 04/24/2021    GFRAA 39 04/24/2021    GFRAA >60 12/28/2012    AGRATIO 1.2 07/12/2020    LABGLOM 32 04/24/2021    LABGLOM 65 09/12/2012    LABGLOM 54 09/12/2012    GLUCOSE 91 04/24/2021    PROT 7.6 07/12/2020    PROT 7.4 12/28/2012    CALCIUM 8.5 04/24/2021    BILITOT 0.5 07/12/2020    ALKPHOS 86 07/12/2020    AST 19 07/12/2020    ALT 13 07/12/2020     No results found for: [employer]  Lab Results   Component Value Date    LABA1C 7.3 04/24/2021     Lab Results   Component Value Date    CKTOTAL 216 (H) 12/28/2012    CKMB 2.0 08/15/2012    CKMBINDEX 1 08/15/2012    TROPONINI <0.01 04/24/2021       Cardiac, Vascular and Imaging Data all Personally Reviewed in Detail by Myself      EKG: Sinus rhythm with A-V dissociation and heart block    Echocardiogram: Pending        Assessment and Plan     -Chest pain tightness precordial chest pain. Possible coronary ischemia. Troponins are negative no acute coronary syndrome. Will possibly require cardiac catheterization. Complete heart block A-V dissociation. Continue to monitor blood pressure is stable. Will require permanent pacemaker if does not reverse    Acute kidney injury consider nephrology consultation. Thank you for allowing us to participate in the care of Cecil English. Please do not hesitate to contact me if you have any questions.     Raymond Garcia MD, MPH    [address],

## 2021-04-24 NOTE — PROGRESS NOTES
Hospitalist Progress Note      PCP: Francine Kearns MD    Date of Admission: 4/23/2021    Chief Complaint: chest pressure, lightheadedness    Hospital Course: patient admitted with av dissociation, symptomatic for a week    Subjective: Denies recurrence of chest pressure, remains in 3rd degree block, denies sob      Medications:  Reviewed    Infusion Medications    dextrose      sodium chloride      sodium chloride 75 mL/hr at 04/23/21 2323     Scheduled Medications    amLODIPine  5 mg Oral Daily    aspirin EC  81 mg Oral Daily    atorvastatin  20 mg Oral Daily    fluticasone  2 spray Each Nostril Daily    pantoprazole  40 mg Oral Daily    insulin lispro  0-6 Units Subcutaneous TID WC    insulin lispro  0-3 Units Subcutaneous Nightly    sodium chloride flush  5-40 mL Intravenous 2 times per day    enoxaparin  30 mg Subcutaneous Daily     PRN Meds: nitroGLYCERIN, albuterol sulfate HFA, glucose, dextrose, glucagon (rDNA), dextrose, sodium chloride flush, sodium chloride, promethazine **OR** ondansetron, polyethylene glycol, acetaminophen **OR** acetaminophen      Intake/Output Summary (Last 24 hours) at 4/24/2021 1007  Last data filed at 4/24/2021 0700  Gross per 24 hour   Intake 1000 ml   Output 1050 ml   Net -50 ml       Physical Exam Performed:    BP (!) 110/55   Pulse 54   Temp 97.8 °F (36.6 °C) (Oral)   Resp 20   Ht 5' 4.5\" (1.638 m)   Wt 262 lb 9.1 oz (119.1 kg)   SpO2 91%   BMI 44.37 kg/m²     General appearance: No apparent distress, appears stated age and cooperative. HEENT: Pupils equal, round, and reactive to light. Conjunctivae/corneas clear. Neck: Supple, with full range of motion. No jugular venous distention. Trachea midline. Respiratory:  Normal respiratory effort. No use of accessory muscles, no intercostal retractions  Cardiovascular:soy rate, regular rhythm  Abdomen: Soft, non-tender, non-distended , obese  Musculoskeletal: No clubbing, cyanosis or edema bilaterally. Full range of motion without deformity. Skin: Skin color, texture, turgor normal.  No rashes or lesions. Neurologic:  Neurovascularly intact without any focal sensory/motor deficits. Cranial nerves: II-XII intact, grossly non-focal.  Psychiatric: Alert and oriented, thought content appropriate, normal insight  Capillary Refill: Brisk,< 3 seconds   Peripheral Pulses: +2 palpable, equal bilaterally       Labs:   Recent Labs     04/23/21 1734 04/24/21 0316   WBC 8.3 6.4   HGB 12.5 10.8*   HCT 37.5 33.3*    165     Recent Labs     04/23/21  1734 04/24/21 0316    139   K 4.3 4.0   CL 99 105   CO2 28 25   BUN 58* 46*   CREATININE 2.4* 1.6*   CALCIUM 9.4 8.5     No results for input(s): AST, ALT, BILIDIR, BILITOT, ALKPHOS in the last 72 hours. No results for input(s): INR in the last 72 hours. Recent Labs     04/23/21 1734 04/24/21 0319   TROPONINI <0.01 <0.01       Urinalysis:      Lab Results   Component Value Date    NITRU Negative 04/24/2021    WBCUA 16 01/29/2019    BACTERIA 4+ 01/29/2019    RBCUA 2 01/29/2019    BLOODU Negative 04/24/2021    SPECGRAV 1.018 04/24/2021    GLUCOSEU 500 04/24/2021    GLUCOSEU NEGATIVE 02/16/2012       Radiology:  XR CHEST (2 VW)   Preliminary Result   No acute cardiopulmonary disease.                  Assessment/Plan:    Active Hospital Problems    Diagnosis Date Noted    Complete heart block (HCC) [I44.2] 04/23/2021     1) Chest pressure  - catherization per Cardiology    2) av dissociation  - poss pm    3) sharri  - rehydrating, improved, holding offending agents    4) dm  - continue corrective iss, controlled    DVT Prophylaxis: lovenox  Diet: Diet NPO Effective Now  Code Status: Full Code         Watson Chua MD

## 2021-04-24 NOTE — CONSULTS
Nephrology Consult Note                                                                                                                                                                                                                                                                                                                                                               Office : [phone]     Fax :[phone]              Patient's Name: Leatha Saeed  2:40 PM  4/24/2021    Reason for Consult: ERIK   Requesting Physician:  Marty Rogers MD      Chief Complaint: CHB     History of Present Ilness:    Leatha Saeed is a 76 y.o. patient with a PMH significant for diabetes mellitus, hyperlipidemia presented with complaints of chest pain dizziness. Patient complains of chest pain. Onset was 2 days ago, with unchanged course since that time. The patient describes the pain as intermittent, precordial in nature, does not radiate. Patient rates pain as a 5/10 in intensity. Associated symptoms are Dizziness. Aggravating factors are exercise. Alleviating factors are: rest. Patient's cardiac risk factors are none. Patient's risk factors for DVT/PE: none.       Past Medical History:   Diagnosis Date    Diabetes mellitus ([address]     GERD (gastroesophageal reflux disease)     Hyperlipidemia     Hypertension     Obesity     S/P hip replacement     Sleep apnea        Past Surgical History:   Procedure Laterality Date    JOINT REPLACEMENT      TOTAL HIP ARTHROPLASTY Left Oct '13       Family History   Problem Relation Age of Onset    Heart Disease Mother         reports that she has never smoked. She has never used smokeless tobacco. She reports that she does not drink alcohol or use drugs.     Allergies:  Codeine and Penicillins    Current Medications:    nitroGLYCERIN (NITROSTAT) SL tablet 0.4 mg, Q5 Min PRN  albuterol sulfate  (90 Base) MCG/ACT inhaler 2 puff, Q6H PRN  amLODIPine (NORVASC) tablet 5 mg, Daily  aspirin EC tablet 81 mg, Daily  atorvastatin (LIPITOR) tablet 20 mg, Daily  fluticasone (FLONASE) 50 MCG/ACT nasal spray 2 spray, Daily  pantoprazole (PROTONIX) tablet 40 mg, Daily  glucose (GLUTOSE) 40 % oral gel 15 g, PRN  dextrose 50 % IV solution, PRN  glucagon (rDNA) injection 1 mg, PRN  dextrose 5 % solution, PRN  insulin lispro (HUMALOG) injection vial 0-6 Units, TID WC  insulin lispro (HUMALOG) injection vial 0-3 Units, Nightly  sodium chloride flush 0.9 % injection 5-40 mL, 2 times per day  sodium chloride flush 0.9 % injection 5-40 mL, PRN  0.9 % sodium chloride infusion, PRN  enoxaparin (LOVENOX) injection 30 mg, Daily  promethazine (PHENERGAN) tablet 12.5 mg, Q6H PRN    Or  ondansetron (ZOFRAN) injection 4 mg, Q6H PRN  polyethylene glycol (GLYCOLAX) packet 17 g, Daily PRN  acetaminophen (TYLENOL) tablet 650 mg, Q6H PRN    Or  acetaminophen (TYLENOL) suppository 650 mg, Q6H PRN  0.9 % sodium chloride infusion, Continuous        Review of Systems:   14 point ROS obtained but were negative except mentioned in HPI      Physical exam:     Vitals:  /62   Pulse (!) 35   Temp 98.2 °F (36.8 °C) (Oral)   Resp 16   Ht 5' 4.5\" (1.638 m)   Wt 262 lb 9.1 oz (119.1 kg)   SpO2 92%   BMI 44.37 kg/m²   Constitutional:  OAA X3 NAD  Skin: no rash, turgor wnl  Heent:  eomi, mmm  Neck: no bruits or jvd noted  Cardiovascular:  S1, S2 without m/r/g  Respiratory: CTA B without w/r/r  Abdomen:  +bs, soft, nt, nd  Ext: + lower extremity edema  Psychiatric: mood and affect appropriate  Musculoskeletal:  Rom, muscular strength intact    Data:   Labs:  CBC:   Recent Labs     04/23/21  1734 04/24/21  0316   WBC 8.3 6.4   HGB 12.5 10.8*    165     BMP:    Recent Labs     04/23/21  1734 04/24/21  0316    139   K 4.3 4.0   CL 99 105   CO2 28 25   BUN 58* 46*   CREATININE 2.4* 1.6*   GLUCOSE 103* 91     Ca/Mg/Phos:   Recent Labs     04/23/21  1734 04/23/21  2135 04/24/21  0316   CALCIUM 9.4  --  8.5   MG --  2.50*  --      Hepatic: No results for input(s): AST, ALT, ALB, BILITOT, ALKPHOS in the last 72 hours. Troponin:   Recent Labs     04/23/21  1734 04/24/21  0319   TROPONINI <0.01 <0.01     BNP: No results for input(s): BNP in the last 72 hours. Lipids: No results for input(s): CHOL, TRIG, HDL, LDLCALC, LABVLDL in the last 72 hours. ABGs: No results for input(s): PHART, PO2ART, ZDB0XBA in the last 72 hours. INR: No results for input(s): INR in the last 72 hours. UA:  Recent Labs     04/24/21  0710   COLORU YELLOW   CLARITYU Clear   GLUCOSEU 500*   BILIRUBINUR Negative   KETUA Negative   SPECGRAV 1.018   BLOODU Negative   PHUR 5.5   PROTEINU Negative   UROBILINOGEN 0.2   NITRU Negative   LEUKOCYTESUR Negative   LABMICR Not Indicated   URINETYPE NotGiven      Urine Microscopic: No results for input(s): LABCAST, BACTERIA, COMU, HYALCAST, WBCUA, RBCUA, EPIU in the last 72 hours. Urine Culture: No results for input(s): LABURIN in the last 72 hours. Urine Chemistry:   Recent Labs     04/24/21  1330   LABCREA 64.4   PROTEINUR 5.00             IMAGING:  XR CHEST (2 VW)   Preliminary Result   No acute cardiopulmonary disease. Assessment/Plan   1. ERIK     2. Complete heart block A-V dissociation    3. Anemia    4. Acid- base/ Electrolyte imbalance     5.  CP       Plan   - ERIK sec to hypoperfusion   - will need cath   - check Ur studies   - need risk stratification for YENI   - monitor UO and renal funciton   - labs in am     Recommend to dose adjust all medications  based on renal functions  Maintain SBP> 90 mmHg   Daily weights   AVOID NSAIDs  Avoid Nephrotoxins  Monitor Intake/Output  Call if significant decrease in urine output               Thank you for allowing us to participate in care of Anais Funes free to contact me   [facility] [address]  Office : [phone]  Fax :[phone]

## 2021-04-24 NOTE — PLAN OF CARE
Problem: Falls - Risk of:  Goal: Will remain free from falls  Outcome: Ongoing  Goal: Absence of physical injury  Outcome: Ongoing     Problem: Cardiac:  Goal: Ability to maintain vital signs within normal range will improve  Outcome: Ongoing  Goal: Cardiovascular alteration will improve  Outcome: Ongoing

## 2021-04-24 NOTE — PLAN OF CARE
Problem: Falls - Risk of:  Goal: Will remain free from falls  Description: Will remain free from falls  4/24/2021 1545 by Storm Jessica RN  Outcome: Ongoing  Note: Side rails up x 3. Bed locked and low position. Falling star program remains in place. Call light and personal belongings within reach. Frequent visual monitoring continues. Toileting program inplace. Patient assisted in turning/repositioning at least once every 2 hours, and on a prn basis. Problem: Falls - Risk of:  Goal: Absence of physical injury  Description: Absence of physical injury  4/24/2021 1545 by Storm Jessica RN  Outcome: Ongoing  Note: No physical injury during this shift. Problem: Cardiac:  Goal: Ability to maintain vital signs within normal range will improve  Description: Ability to maintain vital signs within normal range will improve  4/24/2021 1545 by Storm Jessica RN  Outcome: Ongoing  Note: Pt on third degree block. Pt on continuous monitoring.      Problem: Cardiac:  Goal: Cardiovascular alteration will improve  Description: Cardiovascular alteration will improve  4/24/2021 1545 by Storm Jessica RN  Outcome: Ongoing

## 2021-04-24 NOTE — PROGRESS NOTES
4 Eyes Skin Assessment     NAME:  Mary Marion  YOB: 1955  MEDICAL RECORD NUMBER:  [id number]    The patient is being assess for  Admission    I agree that 2 RN's have performed a thorough Head to Toe Skin Assessment on the patient. ALL assessment sites listed below have been assessed. Areas assessed by both nurses:    Head, Face, Ears, Shoulders, Back, Chest, Arms, Elbows, Hands, Sacrum. Buttock, Coccyx, Ischium and Legs. Feet and Heels        Does the Patient have a Wound?  No noted wound(s)       Scott Prevention initiated:  NA   Wound Care Orders initiated:  NA    Pressure Injury (Stage 3,4, Unstageable, DTI, NWPT, and Complex wounds) if present place consult order under [de-identified] NA    New and Established Ostomies if present place consult order under : NA      Nurse 1 eSignature: Electronically signed by Malik Cabrera RN on 4/24/21 at 5:58 AM EDT    **SHARE this note so that the co-signing nurse is able to place an eSignature**    Nurse 2 eSignature: Electronically signed by Wyatt Rangel RN on 4/24/21 at 6:01 AM EDT

## 2021-04-24 NOTE — H&P
2/2/21  Yes Mellissa Hernandez MD   minocycline (MINOCIN;DYNACIN) 50 MG capsule Take 1 capsule by mouth 2 times daily 1/27/21  Yes Natalie Vilchis MD   pioglitazone (ACTOS) 30 MG tablet Take 1 tablet by mouth daily 1/24/21  Yes Mellissa Hernandez MD   canagliflozin-metformin HCl (INVOKAMET) 150-500 MG TAKE 1 TABLET BY MOUTH TWICE DAILY WITH MEALS  Patient taking differently: TAKE 1 TABLET BY MOUTH EVERY NIGHT 1/22/21  Yes Mellissa Hernandez MD   Dulaglutide (TRULICITY) 9.05 ZF/5.9AE SOPN ADMINISTER 0.5 ML UNDER THE SKIN 1 TIME WEEKLY 1/22/21  Yes Mellissa Hernandez MD   glimepiride (AMARYL) 4 MG tablet TAKE 1 TABLET BY MOUTH EVERY MORNING 1/22/21  Yes Mellissa Hernandez MD   cloNIDine (CATAPRES) 0.2 MG tablet TAKE 1 TABLET BY MOUTH TWICE DAILY  Patient taking differently: Take 0.2 mg by mouth nightly  12/5/20  Yes Mellissa Hernandez MD   oxaprozin (DAYPRO) 600 MG tablet TAKE 1 TABLET BY MOUTH EVERY DAY 9/1/20  Yes Mellissa Hernandez MD   aspirin 81 MG EC tablet Take 1 tablet by mouth daily 10/18/19  Yes Karl Posey MD   meloxicam (MOBIC) 15 MG tablet Take 15 mg by mouth daily 2/9/21   Historical Provider, MD   albuterol sulfate HFA (PROVENTIL HFA) 108 (90 Base) MCG/ACT inhaler Inhale 2 puffs into the lungs every 6 hours as needed for Wheezing 4/19/21   Mellissa Hernandez MD   blood glucose monitor strips Dispense glucose strips covered by insurance and compatible with her glucose monitor  AIC 8.2  Test glucose twice a day 1/22/21   Mellissa Hernandez MD   estradiol (ESTRING) 2 MG vaginal ring Place 2 mg vaginally 2/25/19   Historical Provider, MD   tacrolimus (PROTOPIC) 0.1 % ointment Apply topically to the face 2 times daily if needed for rosacea. 7/23/19   Natalie Vilchis MD   clobetasol (TEMOVATE) 0.05 % ointment Apply to affected area on the left thigh twice daily for up to 2 weeks or until improved.  5/3/18   Natalie Vilchis MD   mometasone (NASONEX) 50 MCG/ACT nasal spray SHAKE WELL AND USE 2 SPRAYS NASALLY DAILY 10/4/17   Mellissa Hernandez, hours. Recent Labs     04/23/21  1734   TROPONINI <0.01       Urinalysis:      Lab Results   Component Value Date    NITRU Negative 01/29/2019    WBCUA 16 01/29/2019    BACTERIA 4+ 01/29/2019    RBCUA 2 01/29/2019    BLOODU Negative 01/29/2019    SPECGRAV 1.026 01/29/2019    GLUCOSEU >=1000 01/29/2019    GLUCOSEU NEGATIVE 02/16/2012       Radiology:          XR CHEST (2 VW)   Preliminary Result   No acute cardiopulmonary disease. ASSESSMENT:    Active Hospital Problems    Diagnosis Date Noted    Complete heart block ([facility] [address] [I44.2] 04/23/2021         PLAN:      Third degree block  - hold clonidine, metoprolol  - icu  - Card consult    Chest pain  - serial trop    DM   - corrective ISS    sharri  - check urine, holding valsartan, hctz, nsaids    DVT Prophylaxis:   Diet: Diet NPO Effective Now  Code Status: Prior       Johnny Fierro MD    Thank you Segundo Parry MD for the opportunity to be involved in this patient's care. If you have any questions or concerns please feel free to contact me at [phone].

## 2021-04-24 NOTE — ED NOTES
Patient transported to Room 2104 in the ICU by this RN and Eben Ramires ED Tech with zoll monitor. ED SBAR report provider to Lito Salazar RN in ICU. IV site clean, dry, and intact. MEWS score and pain assessed as 4/10 and documented. Updated patient and family on plan of care.        John Pappas RN  04/23/21 2110

## 2021-04-25 LAB
A/G RATIO: 1.1 (ref 1.1–2.2)
ALBUMIN SERPL-MCNC: 3.2 G/DL (ref 3.4–5)
ALP BLD-CCNC: 65 U/L (ref 40–129)
ALT SERPL-CCNC: 20 U/L (ref 10–40)
ANION GAP SERPL CALCULATED.3IONS-SCNC: 10 MMOL/L (ref 3–16)
AST SERPL-CCNC: 16 U/L (ref 15–37)
BASOPHILS ABSOLUTE: 0.1 K/UL (ref 0–0.2)
BASOPHILS RELATIVE PERCENT: 0.8 %
BILIRUB SERPL-MCNC: 0.9 MG/DL (ref 0–1)
BUN BLDV-MCNC: 21 MG/DL (ref 7–20)
CALCIUM SERPL-MCNC: 8.6 MG/DL (ref 8.3–10.6)
CHLORIDE BLD-SCNC: 108 MMOL/L (ref 99–110)
CO2: 25 MMOL/L (ref 21–32)
CREAT SERPL-MCNC: 0.9 MG/DL (ref 0.6–1.2)
EOSINOPHILS ABSOLUTE: 0.2 K/UL (ref 0–0.6)
EOSINOPHILS RELATIVE PERCENT: 2.6 %
GFR AFRICAN AMERICAN: >60
GFR NON-AFRICAN AMERICAN: >60
GLOBULIN: 3 G/DL
GLUCOSE BLD-MCNC: 137 MG/DL (ref 70–99)
GLUCOSE BLD-MCNC: 188 MG/DL (ref 70–99)
GLUCOSE BLD-MCNC: 77 MG/DL (ref 70–99)
GLUCOSE BLD-MCNC: 79 MG/DL (ref 70–99)
HCT VFR BLD CALC: 34.1 % (ref 36–48)
HEMOGLOBIN: 11.1 G/DL (ref 12–16)
LYMPHOCYTES ABSOLUTE: 0.9 K/UL (ref 1–5.1)
LYMPHOCYTES RELATIVE PERCENT: 11.5 %
MCH RBC QN AUTO: 28.8 PG (ref 26–34)
MCHC RBC AUTO-ENTMCNC: 32.5 G/DL (ref 31–36)
MCV RBC AUTO: 88.6 FL (ref 80–100)
MONOCYTES ABSOLUTE: 1.1 K/UL (ref 0–1.3)
MONOCYTES RELATIVE PERCENT: 14.2 %
NEUTROPHILS ABSOLUTE: 5.6 K/UL (ref 1.7–7.7)
NEUTROPHILS RELATIVE PERCENT: 70.9 %
PDW BLD-RTO: 15.8 % (ref 12.4–15.4)
PERFORMED ON: ABNORMAL
PERFORMED ON: ABNORMAL
PERFORMED ON: NORMAL
PLATELET # BLD: 160 K/UL (ref 135–450)
PMV BLD AUTO: 10.1 FL (ref 5–10.5)
POTASSIUM REFLEX MAGNESIUM: 4.1 MMOL/L (ref 3.5–5.1)
RBC # BLD: 3.85 M/UL (ref 4–5.2)
SODIUM BLD-SCNC: 143 MMOL/L (ref 136–145)
TOTAL PROTEIN: 6.2 G/DL (ref 6.4–8.2)
WBC # BLD: 7.9 K/UL (ref 4–11)

## 2021-04-25 PROCEDURE — 85025 COMPLETE CBC W/AUTO DIFF WBC: CPT

## 2021-04-25 PROCEDURE — 2580000003 HC RX 258: Performed by: HOSPITALIST

## 2021-04-25 PROCEDURE — 2000000000 HC ICU R&B

## 2021-04-25 PROCEDURE — 80053 COMPREHEN METABOLIC PANEL: CPT

## 2021-04-25 PROCEDURE — 6360000002 HC RX W HCPCS: Performed by: HOSPITALIST

## 2021-04-25 PROCEDURE — 6370000000 HC RX 637 (ALT 250 FOR IP): Performed by: HOSPITALIST

## 2021-04-25 PROCEDURE — 94760 N-INVAS EAR/PLS OXIMETRY 1: CPT

## 2021-04-25 PROCEDURE — 99233 SBSQ HOSP IP/OBS HIGH 50: CPT | Performed by: INTERNAL MEDICINE

## 2021-04-25 PROCEDURE — 99232 SBSQ HOSP IP/OBS MODERATE 35: CPT | Performed by: INTERNAL MEDICINE

## 2021-04-25 PROCEDURE — 36415 COLL VENOUS BLD VENIPUNCTURE: CPT

## 2021-04-25 RX ADMIN — SODIUM CHLORIDE, PRESERVATIVE FREE 10 ML: 5 INJECTION INTRAVENOUS at 21:05

## 2021-04-25 RX ADMIN — PANTOPRAZOLE SODIUM 40 MG: 40 TABLET, DELAYED RELEASE ORAL at 07:43

## 2021-04-25 RX ADMIN — ENOXAPARIN SODIUM 30 MG: 30 INJECTION SUBCUTANEOUS at 07:52

## 2021-04-25 RX ADMIN — FLUTICASONE PROPIONATE 2 SPRAY: 50 SPRAY, METERED NASAL at 07:50

## 2021-04-25 RX ADMIN — ENOXAPARIN SODIUM 40 MG: 40 INJECTION SUBCUTANEOUS at 20:36

## 2021-04-25 RX ADMIN — ATORVASTATIN CALCIUM 20 MG: 20 TABLET, FILM COATED ORAL at 07:45

## 2021-04-25 RX ADMIN — SODIUM CHLORIDE, PRESERVATIVE FREE 10 ML: 5 INJECTION INTRAVENOUS at 07:45

## 2021-04-25 RX ADMIN — INSULIN LISPRO 1 UNITS: 100 INJECTION, SOLUTION INTRAVENOUS; SUBCUTANEOUS at 20:36

## 2021-04-25 RX ADMIN — ASPIRIN 81 MG: 81 TABLET, COATED ORAL at 07:45

## 2021-04-25 RX ADMIN — AMLODIPINE BESYLATE 5 MG: 5 TABLET ORAL at 07:50

## 2021-04-25 NOTE — PROGRESS NOTES
range of motion without deformity. Skin: Skin color, texture, turgor normal.  No rashes or lesions. Neurologic:  Neurovascularly intact without any focal sensory/motor deficits. Cranial nerves: II-XII intact, grossly non-focal.  Psychiatric: Alert and oriented, thought content appropriate, normal insight  Capillary Refill: Brisk,< 3 seconds   Peripheral Pulses: +2 palpable, equal bilaterally       Labs:   Recent Labs     04/23/21  1734 04/24/21  0316 04/25/21  0631   WBC 8.3 6.4 7.9   HGB 12.5 10.8* 11.1*   HCT 37.5 33.3* 34.1*    165 160     Recent Labs     04/23/21  1734 04/24/21  0316 04/25/21  0631    139 143   K 4.3 4.0 4.1   CL 99 105 108   CO2 28 25 25   BUN 58* 46* 21*   CREATININE 2.4* 1.6* 0.9   CALCIUM 9.4 8.5 8.6     Recent Labs     04/25/21  0631   AST 16   ALT 20   BILITOT 0.9   ALKPHOS 65     No results for input(s): INR in the last 72 hours. Recent Labs     04/23/21  1734 04/24/21  0319   TROPONINI <0.01 <0.01       Urinalysis:      Lab Results   Component Value Date    NITRU Negative 04/24/2021    WBCUA 16 01/29/2019    BACTERIA 4+ 01/29/2019    RBCUA 2 01/29/2019    BLOODU Negative 04/24/2021    SPECGRAV 1.018 04/24/2021    GLUCOSEU 500 04/24/2021    GLUCOSEU NEGATIVE 02/16/2012       Radiology:  XR CHEST (2 VW)   Preliminary Result   No acute cardiopulmonary disease.                  Assessment/Plan:    Active Hospital Problems    Diagnosis Date Noted    Precordial chest pain [R07.2]     ERIK (acute kidney injury) ([facility] [address] [N17.9]     Chest pain [R07.9]     Complete heart block (HCC) [I44.2] 04/23/2021     1) Chest pressure  - catherization per Cardiology    2) av dissociation  - poss pm    3) erik  - resolved following rehydration    4) dm  - continue corrective iss, controlled    DVT Prophylaxis: lovenox  Diet: DIET CARDIAC;  Code Status: Full Code         Valente Hughes MD

## 2021-04-25 NOTE — CONSULTS
Q6H PRN    Or  ondansetron (ZOFRAN) injection 4 mg, Q6H PRN  polyethylene glycol (GLYCOLAX) packet 17 g, Daily PRN  acetaminophen (TYLENOL) tablet 650 mg, Q6H PRN    Or  acetaminophen (TYLENOL) suppository 650 mg, Q6H PRN  0.9 % sodium chloride infusion, Continuous          Physical exam:     Vitals:  /75   Pulse 86   Temp 99.8 °F (37.7 °C) (Axillary)   Resp 18   Ht 5' 4.5\" (1.638 m)   Wt 261 lb 11 oz (118.7 kg)   SpO2 96%   BMI 44.22 kg/m²   Constitutional:  OAA X3 NAD  Skin: no rash, turgor wnl  Heent:  eomi, mmm  Neck: no bruits or jvd noted  Cardiovascular:  S1, S2 without m/r/g  Respiratory: CTA B without w/r/r  Abdomen:  +bs, soft, nt, nd  Ext: + lower extremity edema  Psychiatric: mood and affect appropriate  Musculoskeletal:  Rom, muscular strength intact    Data:   Labs:  CBC:   Recent Labs     04/23/21 1734 04/24/21 0316 04/25/21  0631   WBC 8.3 6.4 7.9   HGB 12.5 10.8* 11.1*    165 160     BMP:    Recent Labs     04/23/21 1734 04/24/21 0316 04/25/21  0631    139 143   K 4.3 4.0 4.1   CL 99 105 108   CO2 28 25 25   BUN 58* 46* 21*   CREATININE 2.4* 1.6* 0.9   GLUCOSE 103* 91 79     Ca/Mg/Phos:   Recent Labs     04/23/21 1734 04/23/21  2135 04/24/21 0316 04/25/21  0631   CALCIUM 9.4  --  8.5 8.6   MG  --  2.50*  --   --      Hepatic:   Recent Labs     04/25/21  0631   AST 16   ALT 20   BILITOT 0.9   ALKPHOS 65     Troponin:   Recent Labs     04/23/21 1734 04/24/21  0319   TROPONINI <0.01 <0.01     BNP: No results for input(s): BNP in the last 72 hours. Lipids: No results for input(s): CHOL, TRIG, HDL, LDLCALC, LABVLDL in the last 72 hours. ABGs: No results for input(s): PHART, PO2ART, PGL1QME in the last 72 hours. INR: No results for input(s): INR in the last 72 hours.   UA:  Recent Labs     04/24/21  0710   COLORU YELLOW   CLARITYU Clear   GLUCOSEU 500*   BILIRUBINUR Negative   KETUA Negative   SPECGRAV 1.018   BLOODU Negative   PHUR 5.5   PROTEINU Negative   UROBILINOGEN 0. 2   NITRU Negative   LEUKOCYTESUR Negative   LABMICR Not Indicated   Caretha English NotGiven      Urine Microscopic: No results for input(s): LABCAST, BACTERIA, COMU, HYALCAST, WBCUA, RBCUA, EPIU in the last 72 hours. Urine Culture: No results for input(s): LABURIN in the last 72 hours. Urine Chemistry:   Recent Labs     04/24/21  1330   LABCREA 64.4   PROTEINUR 5.00             IMAGING:  XR CHEST (2 VW)   Preliminary Result   No acute cardiopulmonary disease. Assessment/Plan   1. ERIK     2. Complete heart block A-V dissociation    3. Anemia    4. Acid- base/ Electrolyte imbalance     5.  CP       Plan   - ERIK sec to hypoperfusion   - will need cath   - check Ur studies - no proteinuria   - low risk for  for YENI   - monitor UO and renal funciton   - labs in am     Recommend to dose adjust all medications  based on renal functions  Maintain SBP> 90 mmHg   Daily weights   AVOID NSAIDs  Avoid Nephrotoxins  Monitor Intake/Output  Call if significant decrease in urine output               Thank you for allowing us to participate in care of Anais [facility] free to contact me   [facility] [address]  Office : [phone]  Fax :[phone]

## 2021-04-25 NOTE — PROGRESS NOTES
Physician Progress Note      Oli Queen  CSN #:                  [id number]  :                       1955  ADMIT DATE:       2021 5:21 PM  [address] [place] DATE:  RESPONDING  PROVIDER #:        Ema Rees MD          QUERY TEXT:    Dear Dr. Ezio Rendon and/or associates,  Patient admitted with BMI=44.37. If possible, please document in progress   notes and discharge summary if you are evaluating and /or treating any of the   following: The medical record reflects the following:  Risk Factors: Hx DM  Clinical Indicators: BMI=44.37, 5'4.5\", 262 lb  Treatment:  when appropriate  Thank you,  Kelli Montes RN, CDS  [email or web address]  Options provided:  -- Morbid obesity  -- Obesity  -- Overweight  -- Other - I will add my own diagnosis  -- Disagree - Not applicable / Not valid  -- Disagree - Clinically unable to determine / Unknown  -- Refer to Clinical Documentation Reviewer    PROVIDER RESPONSE TEXT:    This patient has obesity. Query created by:  [address]  on 2021 7:42 AM      Electronically signed by:  Ema Rees MD 2021 10:52 PM

## 2021-04-25 NOTE — PLAN OF CARE
Problem: Falls - Risk of:  Goal: Will remain free from falls  Description: Will remain free from falls  Outcome: Ongoing  Note: Side rails up x 3. Bed locked and low position. Falling star program remains in place. Call light and personal belongings within reach. Frequent visual monitoring continues. Toileting program inplace. Patient assisted in turning/repositioning at least once every 2 hours, and on a prn basis. Problem: Falls - Risk of:  Goal: Absence of physical injury  Description: Absence of physical injury  Outcome: Ongoing  Note: No physical injury during this shift. Problem: Cardiac:  Goal: Ability to maintain vital signs within normal range will improve  Description: Ability to maintain vital signs within normal range will improve  Outcome: Ongoing  Note: Pt still on complete heart block in the cardiac monitor.      Problem: Cardiac:  Goal: Cardiovascular alteration will improve  Description: Cardiovascular alteration will improve  Outcome: Ongoing

## 2021-04-25 NOTE — PROGRESS NOTES
[facility]  Cardiology Consult    Joel Mcpherson  1955 April 25, 2021      Reason for Referral: Complete heart block    CC: Chest pain and dizziness      Subjective:     History of Present Illness:    Joel Mcpherson is a 76 y.o. patient with a PMH significant for diabetes mellitus, hyperlipidemia presented with complaints of chest pain dizziness. Patient complains of chest pain. Onset was 2 days ago, with unchanged course since that time. The patient describes the pain as intermittent, precordial in nature, does not radiate. Patient rates pain as a 5/10 in intensity. Associated symptoms are Dizziness. Aggravating factors are exercise. Alleviating factors are: rest. Patient's cardiac risk factors are none. Patient's risk factors for DVT/PE: none. Previous cardiac testing: none. Past Medical History:   has a past medical history of Diabetes mellitus ([address] GERD (gastroesophageal reflux disease), Hyperlipidemia, Hypertension, Obesity, S/P hip replacement, and Sleep apnea. Surgical History:   has a past surgical history that includes Total hip arthroplasty (Left, Oct '13) and joint replacement. Social History:   reports that she has never smoked. She has never used smokeless tobacco. She reports that she does not drink alcohol or use drugs. Family History:  family history includes Heart Disease in her mother. Home Medications:  Were reviewed and are listed in nursing record and/or below  Prior to Admission medications    Medication Sig Start Date End Date Taking?  Authorizing Provider   valsartan-hydroCHLOROthiazide (DIOVAN-HCT) 320-25 MG per tablet Take 1 tablet by mouth daily 3/17/21  Yes Anitha Flores MD   atorvastatin (LIPITOR) 20 MG tablet TAKE ONE TABLET BY MOUTH DAILY 3/15/21  Yes Anitha Flores MD   metoprolol tartrate (LOPRESSOR) 25 MG tablet TAKE 1 TABLET BY MOUTH TWICE DAILY  Patient taking differently: Take 50 mg by mouth nightly  3/15/21  Yes Anitha Flores MD amLODIPine (NORVASC) 5 MG tablet TAKE 1 TABLET BY MOUTH DAILY 2/2/21  Yes Cee Mancini MD   pantoprazole (PROTONIX) 40 MG tablet TAKE 1 TABLET BY MOUTH DAILY 2/2/21  Yes Cee Mancini MD   minocycline (MINOCIN;DYNACIN) 50 MG capsule Take 1 capsule by mouth 2 times daily 1/27/21  Yes Laura Salvador MD   pioglitazone (ACTOS) 30 MG tablet Take 1 tablet by mouth daily 1/24/21  Yes Cee Mancini MD   canagliflozin-metformin HCl (INVOKAMET) 150-500 MG TAKE 1 TABLET BY MOUTH TWICE DAILY WITH MEALS  Patient taking differently: TAKE 1 TABLET BY MOUTH EVERY NIGHT 1/22/21  Yes Cee Mancini MD   Dulaglutide (TRULICITY) 0.29 AE/8.8LB SOPN ADMINISTER 0.5 ML UNDER THE SKIN 1 TIME WEEKLY 1/22/21  Yes Cee Mancini MD   glimepiride (AMARYL) 4 MG tablet TAKE 1 TABLET BY MOUTH EVERY MORNING 1/22/21  Yes Cee Mancini MD   cloNIDine (CATAPRES) 0.2 MG tablet TAKE 1 TABLET BY MOUTH TWICE DAILY  Patient taking differently: Take 0.2 mg by mouth nightly  12/5/20  Yes Cee Mancini MD   oxaprozin (DAYPRO) 600 MG tablet TAKE 1 TABLET BY MOUTH EVERY DAY 9/1/20  Yes Cee Mancini MD   aspirin 81 MG EC tablet Take 1 tablet by mouth daily 10/18/19  Yes Kirstie Heart MD   meloxicam (MOBIC) 15 MG tablet Take 15 mg by mouth daily 2/9/21   Historical Provider, MD   albuterol sulfate HFA (PROVENTIL HFA) 108 (90 Base) MCG/ACT inhaler Inhale 2 puffs into the lungs every 6 hours as needed for Wheezing 4/19/21   Cee Mancini MD   blood glucose monitor strips Dispense glucose strips covered by insurance and compatible with her glucose monitor  AIC 8.2  Test glucose twice a day 1/22/21   Cee Mancini MD   estradiol (ESTRING) 2 MG vaginal ring Place 2 mg vaginally 2/25/19   Historical Provider, MD   tacrolimus (PROTOPIC) 0.1 % ointment Apply topically to the face 2 times daily if needed for rosacea.  7/23/19   Laura Salvador MD   clobetasol (TEMOVATE) 0.05 % ointment Apply to affected area on the left thigh twice daily for up to 2 weeks or until improved. 5/3/18   Pablo Felder MD   mometasone (NASONEX) 50 MCG/ACT nasal spray SHAKE WELL AND USE 2 SPRAYS NASALLY DAILY 10/4/17   Jensen Vogt MD        CURRENT Medications:  nitroGLYCERIN (NITROSTAT) SL tablet 0.4 mg, Q5 Min PRN  albuterol sulfate  (90 Base) MCG/ACT inhaler 2 puff, Q6H PRN  amLODIPine (NORVASC) tablet 5 mg, Daily  aspirin EC tablet 81 mg, Daily  atorvastatin (LIPITOR) tablet 20 mg, Daily  fluticasone (FLONASE) 50 MCG/ACT nasal spray 2 spray, Daily  pantoprazole (PROTONIX) tablet 40 mg, Daily  glucose (GLUTOSE) 40 % oral gel 15 g, PRN  dextrose 50 % IV solution, PRN  glucagon (rDNA) injection 1 mg, PRN  dextrose 5 % solution, PRN  insulin lispro (HUMALOG) injection vial 0-6 Units, TID WC  insulin lispro (HUMALOG) injection vial 0-3 Units, Nightly  sodium chloride flush 0.9 % injection 5-40 mL, 2 times per day  sodium chloride flush 0.9 % injection 5-40 mL, PRN  0.9 % sodium chloride infusion, PRN  enoxaparin (LOVENOX) injection 30 mg, Daily  promethazine (PHENERGAN) tablet 12.5 mg, Q6H PRN    Or  ondansetron (ZOFRAN) injection 4 mg, Q6H PRN  polyethylene glycol (GLYCOLAX) packet 17 g, Daily PRN  acetaminophen (TYLENOL) tablet 650 mg, Q6H PRN    Or  acetaminophen (TYLENOL) suppository 650 mg, Q6H PRN  0.9 % sodium chloride infusion, Continuous        Allergies:  Codeine and Penicillins           Review of Systems: SEE HPI   · Constitutional: no unanticipated weight loss. There's been no change in energy level, sleep pattern, or activity level. No fevers, chills. · Eyes: No visual changes or diplopia. No scleral icterus. · ENT: No Headaches, hearing loss or vertigo. No mouth sores or sore throat. · Cardiovascular:  Chest pain, tightness or discomfort.  No Shortness of breath. No Dyspnea on exertion, Orthopnea, Paroxysmal nocturnal dyspnea or breathlessness at rest.   No Palpitations.  No Syncope ('blackouts', 'faints', 'collapse') or dizziness.    · Respiratory: No cough or wheezing, no sputum production. No hematemesis. · Gastrointestinal: No abdominal pain, appetite loss, blood in stools. No change in bowel or bladder habits. · Genitourinary: No dysuria, trouble voiding, or hematuria. · Musculoskeletal:  No gait disturbance, no joint complaints. · Integumentary: No rash or pruritis. · Neurological: No headache, diplopia, change in muscle strength, numbness or tingling. · Psychiatric: No anxiety or depression. · Endocrine: No temperature intolerance. No excessive thirst, fluid intake, or urination. No tremor. · Hematologic/Lymphatic: No abnormal bruising or bleeding, blood clots or swollen lymph nodes. · Allergic/Immunologic: No nasal congestion or hives. Objective:     PHYSICAL EXAM:      Vitals:    04/25/21 0835   BP:    Pulse:    Resp:    Temp:    SpO2: 96%    Weight: 261 lb 11 oz (118.7 kg)       General Appearance:  Alert, cooperative, no distress, appears stated age. Head:  Normocephalic, without obvious abnormality, atraumatic. Eyes:  Pupils equal and round. No scleral icterus. Mouth: Moist mucosa, no pharyngeal erythema. Nose: Nares normal. No drainage or sinus tenderness. Neck: Supple, symmetrical, trachea midline. No adenopathy. No tenderness/mass/nodules. No carotid bruit or elevated JVD. Lungs:   Respiratory Effort: Normal   Auscultation: Clear to auscultation bilaterally, respirations unlabored. No wheeze, rales   Chest Wall:  No tenderness or deformity. Cardiovascular:    Pulses  Palpation: normal   Ascultation: Regular rate, S1/ S2 normal. No murmur, rub, or gallop. 2+ radial and pedal pulses, symmetric  Carotid  Femoral   Abdomen and Gastrointestinal:   Soft, non-tender, bowel sounds active. Liver and Spleen  Masses   Musculoskeletal: No muscle wasting  Back  Gait   Extremities: Extremities normal, atraumatic. No cyanosis or edema. No cyanosis clubbing       Skin: Inspection and palpation performed, no rashes or lesions. of [place]. Please do not hesitate to contact me if you have any questions.     Karina Sparks MD, MPH    [facility], Critical access hospital[facility] [address]  Ph: [phone]  Fax: [phone]    4/25/2021 10:29 AM

## 2021-04-26 ENCOUNTER — APPOINTMENT (OUTPATIENT)
Dept: CARDIAC CATH/INVASIVE PROCEDURES | Age: 66
DRG: 243 | End: 2021-04-26
Payer: COMMERCIAL

## 2021-04-26 LAB
ANION GAP SERPL CALCULATED.3IONS-SCNC: 11 MMOL/L (ref 3–16)
BASOPHILS ABSOLUTE: 0.1 K/UL (ref 0–0.2)
BASOPHILS RELATIVE PERCENT: 0.9 %
BUN BLDV-MCNC: 19 MG/DL (ref 7–20)
CALCIUM SERPL-MCNC: 8.9 MG/DL (ref 8.3–10.6)
CHLORIDE BLD-SCNC: 104 MMOL/L (ref 99–110)
CO2: 26 MMOL/L (ref 21–32)
CREAT SERPL-MCNC: 1.1 MG/DL (ref 0.6–1.2)
EOSINOPHILS ABSOLUTE: 0.2 K/UL (ref 0–0.6)
EOSINOPHILS RELATIVE PERCENT: 2.4 %
GFR AFRICAN AMERICAN: >60
GFR NON-AFRICAN AMERICAN: 50
GLUCOSE BLD-MCNC: 102 MG/DL (ref 70–99)
GLUCOSE BLD-MCNC: 110 MG/DL (ref 70–99)
GLUCOSE BLD-MCNC: 113 MG/DL (ref 70–99)
GLUCOSE BLD-MCNC: 144 MG/DL (ref 70–99)
GLUCOSE BLD-MCNC: 96 MG/DL (ref 70–99)
HCT VFR BLD CALC: 34.4 % (ref 36–48)
HEMOGLOBIN: 11.5 G/DL (ref 12–16)
LV EF: 60 %
LVEF MODALITY: NORMAL
LYMPHOCYTES ABSOLUTE: 1.3 K/UL (ref 1–5.1)
LYMPHOCYTES RELATIVE PERCENT: 17.4 %
MCH RBC QN AUTO: 29.6 PG (ref 26–34)
MCHC RBC AUTO-ENTMCNC: 33.5 G/DL (ref 31–36)
MCV RBC AUTO: 88.4 FL (ref 80–100)
MONOCYTES ABSOLUTE: 1.2 K/UL (ref 0–1.3)
MONOCYTES RELATIVE PERCENT: 16.9 %
NEUTROPHILS ABSOLUTE: 4.6 K/UL (ref 1.7–7.7)
NEUTROPHILS RELATIVE PERCENT: 62.4 %
PDW BLD-RTO: 15.8 % (ref 12.4–15.4)
PERFORMED ON: ABNORMAL
PERFORMED ON: NORMAL
PLATELET # BLD: 152 K/UL (ref 135–450)
PMV BLD AUTO: 9.8 FL (ref 5–10.5)
POTASSIUM SERPL-SCNC: 3.9 MMOL/L (ref 3.5–5.1)
RBC # BLD: 3.89 M/UL (ref 4–5.2)
SODIUM BLD-SCNC: 141 MMOL/L (ref 136–145)
WBC # BLD: 7.4 K/UL (ref 4–11)

## 2021-04-26 PROCEDURE — 99233 SBSQ HOSP IP/OBS HIGH 50: CPT | Performed by: HOSPITALIST

## 2021-04-26 PROCEDURE — 93458 L HRT ARTERY/VENTRICLE ANGIO: CPT

## 2021-04-26 PROCEDURE — 93458 L HRT ARTERY/VENTRICLE ANGIO: CPT | Performed by: INTERNAL MEDICINE

## 2021-04-26 PROCEDURE — C1769 GUIDE WIRE: HCPCS

## 2021-04-26 PROCEDURE — 6360000002 HC RX W HCPCS

## 2021-04-26 PROCEDURE — B2111ZZ FLUOROSCOPY OF MULTIPLE CORONARY ARTERIES USING LOW OSMOLAR CONTRAST: ICD-10-PCS | Performed by: INTERNAL MEDICINE

## 2021-04-26 PROCEDURE — 99152 MOD SED SAME PHYS/QHP 5/>YRS: CPT

## 2021-04-26 PROCEDURE — 80048 BASIC METABOLIC PNL TOTAL CA: CPT

## 2021-04-26 PROCEDURE — 6370000000 HC RX 637 (ALT 250 FOR IP): Performed by: HOSPITALIST

## 2021-04-26 PROCEDURE — 2000000000 HC ICU R&B

## 2021-04-26 PROCEDURE — 4A023N7 MEASUREMENT OF CARDIAC SAMPLING AND PRESSURE, LEFT HEART, PERCUTANEOUS APPROACH: ICD-10-PCS | Performed by: INTERNAL MEDICINE

## 2021-04-26 PROCEDURE — C1894 INTRO/SHEATH, NON-LASER: HCPCS

## 2021-04-26 PROCEDURE — B2151ZZ FLUOROSCOPY OF LEFT HEART USING LOW OSMOLAR CONTRAST: ICD-10-PCS | Performed by: INTERNAL MEDICINE

## 2021-04-26 PROCEDURE — 85025 COMPLETE CBC W/AUTO DIFF WBC: CPT

## 2021-04-26 PROCEDURE — 2580000003 HC RX 258: Performed by: HOSPITALIST

## 2021-04-26 PROCEDURE — 99254 IP/OBS CNSLTJ NEW/EST MOD 60: CPT | Performed by: NURSE PRACTITIONER

## 2021-04-26 PROCEDURE — 6360000002 HC RX W HCPCS: Performed by: NURSE PRACTITIONER

## 2021-04-26 PROCEDURE — 2709999900 HC NON-CHARGEABLE SUPPLY

## 2021-04-26 PROCEDURE — 2580000003 HC RX 258: Performed by: INTERNAL MEDICINE

## 2021-04-26 PROCEDURE — 6360000002 HC RX W HCPCS: Performed by: HOSPITALIST

## 2021-04-26 PROCEDURE — 36415 COLL VENOUS BLD VENIPUNCTURE: CPT

## 2021-04-26 PROCEDURE — 2500000003 HC RX 250 WO HCPCS

## 2021-04-26 PROCEDURE — 93306 TTE W/DOPPLER COMPLETE: CPT

## 2021-04-26 PROCEDURE — 6360000004 HC RX CONTRAST MEDICATION: Performed by: INTERNAL MEDICINE

## 2021-04-26 RX ORDER — SODIUM CHLORIDE 9 MG/ML
INJECTION, SOLUTION INTRAVENOUS CONTINUOUS
Status: DISCONTINUED | OUTPATIENT
Start: 2021-04-26 | End: 2021-04-27

## 2021-04-26 RX ORDER — SODIUM CHLORIDE 0.9 % (FLUSH) 0.9 %
5-40 SYRINGE (ML) INJECTION PRN
Status: DISCONTINUED | OUTPATIENT
Start: 2021-04-26 | End: 2021-04-27

## 2021-04-26 RX ORDER — SODIUM CHLORIDE 0.9 % (FLUSH) 0.9 %
5-40 SYRINGE (ML) INJECTION EVERY 12 HOURS SCHEDULED
Status: DISCONTINUED | OUTPATIENT
Start: 2021-04-26 | End: 2021-04-27

## 2021-04-26 RX ORDER — SODIUM CHLORIDE 9 MG/ML
25 INJECTION, SOLUTION INTRAVENOUS PRN
Status: DISCONTINUED | OUTPATIENT
Start: 2021-04-26 | End: 2021-04-27

## 2021-04-26 RX ORDER — ONDANSETRON 2 MG/ML
4 INJECTION INTRAMUSCULAR; INTRAVENOUS EVERY 6 HOURS PRN
Status: DISCONTINUED | OUTPATIENT
Start: 2021-04-26 | End: 2021-04-26 | Stop reason: SDUPTHER

## 2021-04-26 RX ORDER — ACETAMINOPHEN 325 MG/1
650 TABLET ORAL EVERY 4 HOURS PRN
Status: DISCONTINUED | OUTPATIENT
Start: 2021-04-26 | End: 2021-04-27 | Stop reason: HOSPADM

## 2021-04-26 RX ADMIN — INSULIN LISPRO 1 UNITS: 100 INJECTION, SOLUTION INTRAVENOUS; SUBCUTANEOUS at 21:30

## 2021-04-26 RX ADMIN — SODIUM CHLORIDE, PRESERVATIVE FREE 10 ML: 5 INJECTION INTRAVENOUS at 08:32

## 2021-04-26 RX ADMIN — ASPIRIN 81 MG: 81 TABLET, COATED ORAL at 08:32

## 2021-04-26 RX ADMIN — ATORVASTATIN CALCIUM 20 MG: 20 TABLET, FILM COATED ORAL at 08:32

## 2021-04-26 RX ADMIN — SODIUM CHLORIDE, PRESERVATIVE FREE 10 ML: 5 INJECTION INTRAVENOUS at 21:29

## 2021-04-26 RX ADMIN — FLUTICASONE PROPIONATE 2 SPRAY: 50 SPRAY, METERED NASAL at 09:01

## 2021-04-26 RX ADMIN — IOPAMIDOL 75 ML: 755 INJECTION, SOLUTION INTRAVENOUS at 15:20

## 2021-04-26 RX ADMIN — PANTOPRAZOLE SODIUM 40 MG: 40 TABLET, DELAYED RELEASE ORAL at 08:32

## 2021-04-26 RX ADMIN — ENOXAPARIN SODIUM 40 MG: 40 INJECTION SUBCUTANEOUS at 21:30

## 2021-04-26 RX ADMIN — AMLODIPINE BESYLATE 5 MG: 5 TABLET ORAL at 10:44

## 2021-04-26 RX ADMIN — ENOXAPARIN SODIUM 40 MG: 40 INJECTION SUBCUTANEOUS at 08:32

## 2021-04-26 RX ADMIN — SODIUM CHLORIDE: 9 INJECTION, SOLUTION INTRAVENOUS at 21:30

## 2021-04-26 ASSESSMENT — PAIN SCALES - GENERAL
PAINLEVEL_OUTOF10: 0
PAINLEVEL_OUTOF10: 0

## 2021-04-26 NOTE — ACP (ADVANCE CARE PLANNING)
orders.     Length of ACP Conversation in minutes:  4    Conversation Outcomes:  [x] ACP discussion completed  [] Existing advance directive reviewed with patient; no changes to patient's previously recorded wishes  [x] New Advance Directive referral made for living will information  [] Portable Do Not Rescitate prepared for Provider review and signature  [] POLST/POST/MOLST/MOST prepared for Provider review and signature      Follow-up plan:    [] Schedule follow-up conversation to continue planning  [] Referred individual to Provider for additional questions/concerns   [] Advised patient/agent/surrogate to review completed ACP document and update if needed with changes in condition, patient preferences or care setting    [x] This note routed to one or more involved healthcare providers               Hedy Sykes RN, BSN, Case Management  Phone: [phone]  Electronically signed by Hedy Sykes RN on 4/26/2021 at 5:33 PM

## 2021-04-26 NOTE — CONSULTS
Cardiac Electrophysiology Consultation     Date: 4/26/2021  Admit Date:  4/23/2021  Admission Diagnosis: Complete heart block ([facility] [address] [I44.2]     Reason for Consultation: Complete heart block  Consult Requesting Physician: Linda Vicente MD       History of Present Illness  Bj Castellon is a 76y.o. year old female with past medical history significant for HTN, HLD, VEL and DM who presented to the ED from her PCP's office for chest pain, SOB and complete heart block seen on EKG. She had been feeling poorly for about a week prior, thought maybe she was getting ill as she was very tired and SOB with minimal activity. 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 She was also having some \"tightness\" in the center of her chest that would worsen with activity and improve with rest. Also admits to feeling lightheaded, no syncope. She does take metoprolol but denies any recent change to her dose and her last dose was on 4/22. She has continued to have chest tightness while in the hospital and SOB with activity. Continues to have CHB with intermittent 1:1 AV conduction while at rest. Denies any other recent illness or travel.         Past Medical History:   Diagnosis Date    Diabetes mellitus ([facility] [address]     GERD (gastroesophageal reflux disease)     Hyperlipidemia     Hypertension     Obesity     S/P hip replacement     Sleep apnea         Past Surgical History:   Procedure Laterality Date    JOINT REPLACEMENT      TOTAL HIP ARTHROPLASTY Left Oct '13       Current Outpatient Medications   Medication Instructions    albuterol sulfate HFA (PROVENTIL HFA) 108 (90 Base) MCG/ACT inhaler 2 puffs, Inhalation, EVERY 6 HOURS PRN    amLODIPine (NORVASC) 5 MG tablet TAKE 1 TABLET BY MOUTH DAILY    aspirin EC 81 mg, Oral, DAILY    atorvastatin (LIPITOR) 20 MG tablet TAKE ONE TABLET BY MOUTH DAILY    blood glucose monitor strips Dispense glucose strips covered by insurance and compatible with her glucose monitor<BR>AIC 8.2<BR>Test glucose twice a day    canagliflozin-metformin HCl (INVOKAMET) 150-500 MG TAKE 1 TABLET BY MOUTH TWICE DAILY WITH MEALS    clobetasol (TEMOVATE) 0.05 % ointment Apply to affected area on the left thigh twice daily for up to 2 weeks or until improved.  cloNIDine (CATAPRES) 0.2 MG tablet TAKE 1 TABLET BY MOUTH TWICE DAILY    Dulaglutide (TRULICITY) 3.61 QE/7.0SN SOPN ADMINISTER 0.5 ML UNDER THE SKIN 1 TIME WEEKLY    estradiol (ESTRING) 2 mg, Vaginal    glimepiride (AMARYL) 4 MG tablet TAKE 1 TABLET BY MOUTH EVERY MORNING    meloxicam (MOBIC) 15 mg, Oral, DAILY    metoprolol tartrate (LOPRESSOR) 25 MG tablet TAKE 1 TABLET BY MOUTH TWICE DAILY    minocycline (MINOCIN;DYNACIN) 50 mg, Oral, 2 TIMES DAILY    mometasone (NASONEX) 50 MCG/ACT nasal spray SHAKE WELL AND USE 2 SPRAYS NASALLY DAILY    oxaprozin (DAYPRO) 600 MG tablet TAKE 1 TABLET BY MOUTH EVERY DAY    pantoprazole (PROTONIX) 40 MG tablet TAKE 1 TABLET BY MOUTH DAILY    pioglitazone (ACTOS) 30 mg, Oral, DAILY    tacrolimus (PROTOPIC) 0.1 % ointment Apply topically to the face 2 times daily if needed for rosacea.  valsartan-hydroCHLOROthiazide (DIOVAN-HCT) 320-25 MG per tablet 1 tablet, Oral, DAILY        Allergies   Allergen Reactions    Codeine Itching    Penicillins Itching       Social History:   reports that she has never smoked. She has never used smokeless tobacco. She reports that she does not drink alcohol or use drugs. Family History:  family history includes Heart Disease in her mother. Review of Systems:  · General: positive for fatigue, negative for fever, chills   · Ophthalmic ROS: negative for eye pain or loss of vision  · ENT ROS: negative for headaches, sore throat, nasal drainage  · Respiratory: positive for BERGMAN, negative for cough, sputum. · Cardiovascular: positive for chest pain and lightheadedness, no palpitations.   · Gastrointestinal: negative for abdominal pain, diarrhea, N/V  · Hematology: negative for bleeding, blood clots, bruising or jaundice  · Genito-Urinary:  negative for dysuria or incontinence  · Musculoskeletal: negative for joint swelling, muscle pain  · Neurological: negative for confusion, dizziness, headaches   · Psychiatric: negative anxiety, depression  · Dermatological: negative for rash    Medications:  Scheduled Meds:   enoxaparin  40 mg Subcutaneous BID    amLODIPine  5 mg Oral Daily    aspirin EC  81 mg Oral Daily    atorvastatin  20 mg Oral Daily    fluticasone  2 spray Each Nostril Daily    pantoprazole  40 mg Oral Daily    insulin lispro  0-6 Units Subcutaneous TID WC    insulin lispro  0-3 Units Subcutaneous Nightly    sodium chloride flush  5-40 mL Intravenous 2 times per day      Continuous Infusions:   dextrose      sodium chloride      sodium chloride Stopped (21 0900)     PRN Meds:.nitroGLYCERIN, albuterol sulfate HFA, glucose, dextrose, glucagon (rDNA), dextrose, sodium chloride flush, sodium chloride, promethazine **OR** ondansetron, polyethylene glycol, acetaminophen **OR** acetaminophen     Physical Examination:  Vitals:    21 0800   BP: 90/74   Pulse: 97   Resp: 16   Temp: 98.2 °F (36.8 °C)   SpO2: 94%        Intake/Output Summary (Last 24 hours) at 2021 0832  Last data filed at 2021 0533  Gross per 24 hour   Intake 240 ml   Output 750 ml   Net -510 ml     In: 240 [P.O.:240]  Out: 750    Wt Readings from Last 3 Encounters:   21 260 lb 9.3 oz (118.2 kg)   21 262 lb (118.8 kg)   21 253 lb (114.8 kg)     Temp  Av.5 °F (36.9 °C)  Min: 98.1 °F (36.7 °C)  Max: 99.2 °F (37.3 °C)  Pulse  Av.1  Min: 36  Max: 97  BP  Min: 90/74  Max: 152/68  SpO2  Av.6 %  Min: 90 %  Max: 100 %    · Telemetry: Sinus rhythm with complete heart block, v-rates 40s. · Constitutional: Alert, in no acute distress. Appears stated age. · Head: Normocephalic and atraumatic.    · Eyes: Conjunctivae normal. EOM are normal.   · Neck: Neck supple. No lymphadenopathy. No rigidity. No JVD present. · Cardiovascular: Normal rate, regular rhythm. No murmurs, rubs or gallops. No S3 or S4.  · Pulmonary/Chest: Clear breath sounds bilaterally. No crackles, wheezes or rhonchi. No respiratory accessory muscle use. · Abdominal: Soft. Normal bowel sounds present. No distension, No tenderness. · Musculoskeletal: No tenderness. No edema    · Lymphadenopathy: Has no cervical adenopathy. · Neurological: Alert and oriented. No gross deficits. · Skin: Skin is warm and dry. No rash, lesions, ulcerations noted. · Psychiatric: No anxiety nor agitation. Labs:  Reviewed. Recent Labs     21  0631 21  0558    143 141   K 4.0 4.1 3.9    108 104   CO2  25 26   BUN 46* 21* 19   CREATININE 1.6* 0.9 1.1     Recent Labs     21  0631 21  0558   WBC 6.4 7.9 7.4   HGB 10.8* 11.1* 11.5*   HCT 33.3* 34.1* 34.4*   MCV 88.4 88.6 88.4    160 152     Lab Results   Component Value Date    CKTOTAL 216 2012    CKMB 2.0 08/15/2012    CKMBINDEX 1 08/15/2012    TROPONINI <0.01 2021     No results found for: BNP  Lab Results   Component Value Date    PROTIME 11.1 2018    PROTIME 12.1 10/22/2013    PROTIME 11.2 2012    INR 0.98 2018    INR 1.08 10/22/2013    INR 1.1 2012     Lab Results   Component Value Date    CHOL 158 2016    HDL 43 2016    HDL 46 2012    TRIG 88 2016       Diagnostic and imaging results reviewed. EC21  SR with CHB. V-rate 36 BPM.     Stress: 18  Abnormal study.  There is a large sized, moderate intensity, reversible    defect of the basal to mid anterolateral, mid inferolateral, and apical    lateral walls which is consistent with ischemia.    There is breast attenuation but stress images appear worse.    Normal LV size and systolic function.    Uncontrolled hypertension.    Overall findings represent a high risk study. Cath: 2/13/18  Impression:  No evidence   coronary artery disease  False positive stress test    Assessment & Plan:    Complete heart block   - noted on EKG from 4/23   - symptomatic with CP, BERGMAN, fatigue and lightheaded   - metoprolol has been on hold with last dose on 4/22 but continues to have CHB on monitor, some intermittent 1:1 AV conduction   - unclear if this is a primary or secondary issue, given CP, plans for LHC with Dr. Cristel Wise today   - if no intervention needed on LHC, will likely need pacemaker as she meets Class I indications for such - likely to be done tomorrow which was discussed with the pt   - check echo   - continue to hold AV sudheer blocking agents    Chest pain   - ruled out for ACS   - had LHC in 2018 with normal coronaries per report   - plans for [facility] today with Dr. Cristel Wise    ERIK    - likely secondary to hypoperfusion from CHB/hypotension   - nephrology following    Essential HTN   - on multiple anti-hypertensives at home, BP has been a little \"soft\" and will likely need to hold amlodipine as well     Discussed with Dr. Bonita Romano.     JANIA Khan  [facility] [address], [address] [place] [address]  Phone: [phone]  Fax: [phone]    Electronically signed by JANIA Caldwell - CNP on 4/26/2021 at 8:32 AM

## 2021-04-26 NOTE — PROGRESS NOTES
Moderate Sedation:  Start time: 15:00  Stop time: 15:19  1 mg versed   50 mcg fentanyl   An independent trained observer pushed medications at my direction. We monitored the patient's level of consciousness and vital signs/physiologic status throughout the procedure duration (see start and start times above).

## 2021-04-26 NOTE — CARE COORDINATION
INITIAL CASE MANAGEMENT ASSESSMENT    Reviewed chart, met with patient to assess possible discharge needs. Explained Case Management role/services. 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 Denies needs at this time. Pacemaker placement planned for tomorrow. CM provided contact information for patient or family to call with any questions. CM will follow and assist as needed.     Janice Adam RN, BSN, Case Management  [phone]  Electronically signed by Janice Adam RN on 4/26/2021 at 5:33 PM

## 2021-04-26 NOTE — CONSULTS
Office: [phone]       Fax: [phone]      Nephrology Progress Note        Patient's Name: Vikas Guardado  Admit Date: 4/23/2021  Date of Visit: 4/26/2021    Reason for Consult:  ERIK      Subjective:      Vikas Guardado is a 76 y.o. female with PMHx of hypertension, diabetes mellitus who was hospitalized on 4/23/2021 with complaints of chest pain   Home NSAID use : yes    INTERVAL HISTORY    Feels better  Shortness of breath: No   UOP: Fair  Creat: trending down  No dizziness     Medications: Allergies:  Codeine and Penicillins    Scheduled Meds:   enoxaparin  40 mg Subcutaneous BID    amLODIPine  5 mg Oral Daily    aspirin EC  81 mg Oral Daily    atorvastatin  20 mg Oral Daily    fluticasone  2 spray Each Nostril Daily    pantoprazole  40 mg Oral Daily    insulin lispro  0-6 Units Subcutaneous TID WC    insulin lispro  0-3 Units Subcutaneous Nightly    sodium chloride flush  5-40 mL Intravenous 2 times per day     Continuous Infusions:   dextrose      sodium chloride      sodium chloride Stopped (04/25/21 0900)       Labs:  CBC:   Recent Labs     04/24/21  0316 04/25/21  0631 04/26/21  0558   WBC 6.4 7.9 7.4   HGB 10.8* 11.1* 11.5*    160 152     Ca/Mg/Phos:   Recent Labs     04/23/21  2135 04/24/21  0316 04/25/21  0631 04/26/21  0558   CALCIUM  --  8.5 8.6 8.9   MG 2.50*  --   --   --      UA:  Recent Labs     04/24/21  0710   COLORU YELLOW   CLARITYU Clear   GLUCOSEU 500*   BILIRUBINUR Negative   KETUA Negative   SPECGRAV 1.018   BLOODU Negative   PHUR 5.5   PROTEINU Negative   UROBILINOGEN 0.2   NITRU Negative   LEUKOCYTESUR Negative   LABMICR Not Indicated   URINETYPE NotGiven      Urine Microscopic: No results for input(s): LABCAST, BACTERIA, COMU, HYALCAST, WBCUA, RBCUA, EPIU in the last 72 hours.   Urine Chemistry:   Recent Labs 04/24/21  1330   LABCREA 64.4   PROTEINUR 5.00           Objective:     Vitals: BP 90/74   Pulse 97   Temp 98.2 °F (36.8 °C) (Oral)   Resp 16   Ht 5' 4.5\" (1.638 m)   Wt 260 lb 9.3 oz (118.2 kg)   SpO2 94%   BMI 44.04 kg/m²    Wt Readings from Last 3 Encounters:   04/26/21 260 lb 9.3 oz (118.2 kg)   04/23/21 262 lb (118.8 kg)   01/22/21 253 lb (114.8 kg)      24HR INTAKE/OUTPUT:      Intake/Output Summary (Last 24 hours) at 4/26/2021 2142  Last data filed at 4/26/2021 0533  Gross per 24 hour   Intake 240 ml   Output 750 ml   Net -510 ml     Constitutional:  awake, NAD  HEENT:  MMM, No icterus  Neck: no bruits, No JVD  Cardiovascular:  reg rhythm  Respiratory: CTA, no crackles  Abdomen:  +BS, soft, NT, ND  Ext: no lower extremity edema  CNS: alert, no agitation    IMAGING:  XR CHEST (2 VW)   Final Result   No acute cardiopulmonary disease. Assessment :     1. ERIK on suspected CKD  -Non-Oliguric  -Baseline creat: [facility] Now 2.4->1.6-->1.1  -UA: prot, bld negative UPC 0.1  -Volume: hypoperfusion  -Electrolytes: No Dyskalemia  -Acid-Base: Metabolic alkalosis  -poor renal perfusion, home NSAID use    Recent Labs     04/23/21  1734 04/24/21  0316 04/25/21  0631 04/26/21  0558   BUN 58* 46* 21* 19   CREATININE 2.4* 1.6* 0.9 1.1     Recent Labs     04/23/21  1734 04/23/21  2135 04/24/21  0316 04/25/21  0631 04/26/21  0558     --  139 143 141   K 4.3  --  4.0 4.1 3.9   CO2 28  --  25 25 26   MG  --  2.50*  --   --   --          2. HTN  -Blood pressure low    BP Readings from Last 1 Encounters:   04/26/21 90/74       3. Complete heart block A-V dissociation    4. chest pain   - TTE - pend    Plan:     - hemodynamic support  - plan for PPM per card  - give IVF  - Hold Diovan/HCTZ  - no NSAID (educated)  - Monitor BMP    -Monitor I/O, UOP  -Maintain MAP>65  -Avoid nephrotoxin, if able. -Dose meds to current eGFR    Thank you for allowing us to participate in care of Enma Cooper .  We will continue to follow. Feel free to contact me with any questions.       Patricio Cranker  4/26/2021    [facility] [address]  Office : [phone]  Fax :[phone]

## 2021-04-26 NOTE — PROGRESS NOTES
Pt came from cath lab with 2 RNs. Pt alert, oriented x 4, follow commands. No signs of respiratory distress. No complaints at this time, VS stable,It was noted a TR band in her right wrist with 18 ml of air. During this shift, it was pulled 2 ml air every 15 minutes , and neuro assessment was performed. At [facility], TR band was taken out by [facility] and no bleeding was noted.

## 2021-04-26 NOTE — CONSULTS
The NP's [facility], EP NP) documentation has been prepared under my direction and personally reviewed by me in its entirety. I confirm that the consultation note created by the NP accurately reflects all work, physical examination, the discussion of treatments and procedures, and medical decision making by the NP. In addition, I have personally met with; performed a physical examination on; discussed the diagnosis (-es), treatment options including procedures, and formulated medical decisions for this patient. In brief, 77 yo female diagnosed with new-found CHB with underlying sinus rhythm. Cath normal with echo showing LVEF 65%. Meets class I indication for PPM implantation. Will plan for tomorrow. NPO p MN tonight. Please refer to the NP's consult note for full details on the assessment and plan. Thank you for allowing us to participate in the care of your patient. If you have any questions, please do not hesitate to contact us.      Albino Sewell MD, MS, [facility], [facility]  Cardiac Electrophysiology  [address]  [address] [facility], [address] [place] [address] Bridger Garcia [address]  [phone]

## 2021-04-26 NOTE — PLAN OF CARE
Problem: Falls - Risk of:  Goal: Will remain free from falls  Description: Will remain free from falls  Outcome: Ongoing  Note: Side rails up x 3. Bed locked and low position. Falling star program remains in place. Call light and personal belongings within reach. Frequent visual monitoring continues. Toileting program inplace. Patient assisted in turning/repositioning at least once every 2 hours, and on a prn basis. Problem: Falls - Risk of:  Goal: Absence of physical injury  Description: Absence of physical injury  Outcome: Ongoing  Note: No physical injury during this shift.      Problem: Cardiac:  Goal: Ability to maintain vital signs within normal range will improve  Description: Ability to maintain vital signs within normal range will improve  Outcome: Ongoing  Note: Pt on intermittent complete heart block,BP stable     Problem: Cardiac:  Goal: Cardiovascular alteration will improve  Description: Cardiovascular alteration will improve  Outcome: Ongoing

## 2021-04-26 NOTE — PROGRESS NOTES
Hospitalist Progress Note      PCP: Rody Verdin MD    Date of Admission: 4/23/2021    Subjective: awaiting cath today, denies any CP    Medications:  Reviewed    Infusion Medications    dextrose      sodium chloride      sodium chloride Stopped (04/25/21 0900)     Scheduled Medications    enoxaparin  40 mg Subcutaneous BID    amLODIPine  5 mg Oral Daily    aspirin EC  81 mg Oral Daily    atorvastatin  20 mg Oral Daily    fluticasone  2 spray Each Nostril Daily    pantoprazole  40 mg Oral Daily    insulin lispro  0-6 Units Subcutaneous TID WC    insulin lispro  0-3 Units Subcutaneous Nightly    sodium chloride flush  5-40 mL Intravenous 2 times per day     PRN Meds: perflutren lipid microspheres, nitroGLYCERIN, albuterol sulfate HFA, glucose, dextrose, glucagon (rDNA), dextrose, sodium chloride flush, sodium chloride, promethazine **OR** ondansetron, polyethylene glycol, acetaminophen **OR** acetaminophen      Intake/Output Summary (Last 24 hours) at 4/26/2021 1603  Last data filed at 4/26/2021 0533  Gross per 24 hour   Intake 240 ml   Output 750 ml   Net -510 ml       Physical Exam Performed:    BP (!) 145/65   Pulse 77   Temp 98.5 °F (36.9 °C) (Oral)   Resp 16   Ht 5' 4.5\" (1.638 m)   Wt 260 lb 9.3 oz (118.2 kg)   SpO2 94%   BMI 44.04 kg/m²     General appearance: No apparent distress, appears stated age and cooperative. HEENT: Pupils equal, round, and reactive to light. Conjunctivae/corneas clear. Neck: Supple, with full range of motion. No jugular venous distention. Trachea midline. Respiratory:  Normal respiratory effort. No use of accessory muscles, no intercostal retractions  Cardiovascular:soy rate, regular rhythm  Abdomen: Soft, non-tender, non-distended , obese  Musculoskeletal: No clubbing, cyanosis or edema bilaterally. Full range of motion without deformity. Skin: Skin color, texture, turgor normal.  No rashes or lesions.   Neurologic:  Neurovascularly intact without any focal sensory/motor deficits. Cranial nerves: II-XII intact, grossly non-focal.  Psychiatric: Alert and oriented, thought content appropriate, normal insight  Capillary Refill: Brisk,< 3 seconds   Peripheral Pulses: +2 palpable, equal bilaterally     Labs:   Recent Labs     04/24/21  0316 04/25/21  0631 04/26/21  0558   WBC 6.4 7.9 7.4   HGB 10.8* 11.1* 11.5*   HCT 33.3* 34.1* 34.4*    160 152     Recent Labs     04/24/21  0316 04/25/21  0631 04/26/21  0558    143 141   K 4.0 4.1 3.9    108 104   CO2 25 25 26   BUN 46* 21* 19   CREATININE 1.6* 0.9 1.1   CALCIUM 8.5 8.6 8.9     Recent Labs     04/25/21  0631   AST 16   ALT 20   BILITOT 0.9   ALKPHOS 65     No results for input(s): INR in the last 72 hours. Recent Labs     04/23/21  1734 04/24/21  0319   TROPONINI <0.01 <0.01       Urinalysis:      Lab Results   Component Value Date    NITRU Negative 04/24/2021    WBCUA 16 01/29/2019    BACTERIA 4+ 01/29/2019    RBCUA 2 01/29/2019    BLOODU Negative 04/24/2021    SPECGRAV 1.018 04/24/2021    GLUCOSEU 500 04/24/2021    GLUCOSEU NEGATIVE 02/16/2012       Radiology:  XR CHEST (2 VW)   Final Result   No acute cardiopulmonary disease.                  Assessment/Plan:    Active Hospital Problems    Diagnosis    Precordial chest pain [R07.2]    ERIK (acute kidney injury) ([facility] [address] [N17.9]    Chest pain [R07.9]    Complete heart block (HCC) [I44.2]         1) Chest pressure  - trops neg, cardio consulted  - catherization per Cardiology today  - on ASA/statin, no BB 2/2 heart block     2) av dissociation/heart block  - cardio consulted, EP eval  - poss need for pacemaker placement in am     3) ARF  - resolved following rehydration  - nephrology consulted     4) DM II  - continue corrective iss, controlled       DVT Prophylaxis: lovenox  Diet: Diet NPO Effective Now  Diet NPO, After Midnight  Code Status: Full Code    PT/OT Eval Status: ordered    David Ulloa MD

## 2021-04-27 ENCOUNTER — NURSE ONLY (OUTPATIENT)
Dept: CARDIOLOGY CLINIC | Age: 66
End: 2021-04-27

## 2021-04-27 ENCOUNTER — APPOINTMENT (OUTPATIENT)
Dept: GENERAL RADIOLOGY | Age: 66
DRG: 243 | End: 2021-04-27
Payer: COMMERCIAL

## 2021-04-27 VITALS
DIASTOLIC BLOOD PRESSURE: 69 MMHG | SYSTOLIC BLOOD PRESSURE: 132 MMHG | HEART RATE: 88 BPM | HEIGHT: 65 IN | RESPIRATION RATE: 16 BRPM | BODY MASS INDEX: 43.42 KG/M2 | WEIGHT: 260.58 LBS | OXYGEN SATURATION: 98 % | TEMPERATURE: 98.6 F

## 2021-04-27 DIAGNOSIS — Z95.0 PACEMAKER: Primary | ICD-10-CM

## 2021-04-27 LAB
ANION GAP SERPL CALCULATED.3IONS-SCNC: 6 MMOL/L (ref 3–16)
BASOPHILS ABSOLUTE: 0 K/UL (ref 0–0.2)
BASOPHILS RELATIVE PERCENT: 0.7 %
BUN BLDV-MCNC: 13 MG/DL (ref 7–20)
CALCIUM SERPL-MCNC: 8.8 MG/DL (ref 8.3–10.6)
CHLORIDE BLD-SCNC: 105 MMOL/L (ref 99–110)
CO2: 29 MMOL/L (ref 21–32)
CREAT SERPL-MCNC: 0.9 MG/DL (ref 0.6–1.2)
EOSINOPHILS ABSOLUTE: 0.3 K/UL (ref 0–0.6)
EOSINOPHILS RELATIVE PERCENT: 4.2 %
GFR AFRICAN AMERICAN: >60
GFR NON-AFRICAN AMERICAN: >60
GLUCOSE BLD-MCNC: 100 MG/DL (ref 70–99)
GLUCOSE BLD-MCNC: 86 MG/DL (ref 70–99)
GLUCOSE BLD-MCNC: 96 MG/DL (ref 70–99)
GLUCOSE BLD-MCNC: 98 MG/DL (ref 70–99)
HCT VFR BLD CALC: 33.4 % (ref 36–48)
HEMOGLOBIN: 11 G/DL (ref 12–16)
LYMPHOCYTES ABSOLUTE: 1.4 K/UL (ref 1–5.1)
LYMPHOCYTES RELATIVE PERCENT: 19.4 %
MCH RBC QN AUTO: 29 PG (ref 26–34)
MCHC RBC AUTO-ENTMCNC: 32.9 G/DL (ref 31–36)
MCV RBC AUTO: 88.3 FL (ref 80–100)
MONOCYTES ABSOLUTE: 1.3 K/UL (ref 0–1.3)
MONOCYTES RELATIVE PERCENT: 17.9 %
NEUTROPHILS ABSOLUTE: 4.2 K/UL (ref 1.7–7.7)
NEUTROPHILS RELATIVE PERCENT: 57.8 %
PDW BLD-RTO: 15.5 % (ref 12.4–15.4)
PERFORMED ON: NORMAL
PLATELET # BLD: 152 K/UL (ref 135–450)
PMV BLD AUTO: 10.4 FL (ref 5–10.5)
POTASSIUM SERPL-SCNC: 3.8 MMOL/L (ref 3.5–5.1)
RBC # BLD: 3.78 M/UL (ref 4–5.2)
SODIUM BLD-SCNC: 140 MMOL/L (ref 136–145)
WBC # BLD: 7.2 K/UL (ref 4–11)

## 2021-04-27 PROCEDURE — 6360000002 HC RX W HCPCS

## 2021-04-27 PROCEDURE — 02HK3JZ INSERTION OF PACEMAKER LEAD INTO RIGHT VENTRICLE, PERCUTANEOUS APPROACH: ICD-10-PCS | Performed by: INTERNAL MEDICINE

## 2021-04-27 PROCEDURE — C1785 PMKR, DUAL, RATE-RESP: HCPCS

## 2021-04-27 PROCEDURE — 02H63JZ INSERTION OF PACEMAKER LEAD INTO RIGHT ATRIUM, PERCUTANEOUS APPROACH: ICD-10-PCS | Performed by: INTERNAL MEDICINE

## 2021-04-27 PROCEDURE — 33208 INSRT HEART PM ATRIAL & VENT: CPT | Performed by: INTERNAL MEDICINE

## 2021-04-27 PROCEDURE — 94760 N-INVAS EAR/PLS OXIMETRY 1: CPT

## 2021-04-27 PROCEDURE — 0JH606Z INSERTION OF PACEMAKER, DUAL CHAMBER INTO CHEST SUBCUTANEOUS TISSUE AND FASCIA, OPEN APPROACH: ICD-10-PCS | Performed by: INTERNAL MEDICINE

## 2021-04-27 PROCEDURE — 2580000003 HC RX 258: Performed by: INTERNAL MEDICINE

## 2021-04-27 PROCEDURE — 80048 BASIC METABOLIC PNL TOTAL CA: CPT

## 2021-04-27 PROCEDURE — C1898 LEAD, PMKR, OTHER THAN TRANS: HCPCS

## 2021-04-27 PROCEDURE — C1894 INTRO/SHEATH, NON-LASER: HCPCS

## 2021-04-27 PROCEDURE — 99153 MOD SED SAME PHYS/QHP EA: CPT | Performed by: FAMILY MEDICINE

## 2021-04-27 PROCEDURE — 71046 X-RAY EXAM CHEST 2 VIEWS: CPT

## 2021-04-27 PROCEDURE — 6360000002 HC RX W HCPCS: Performed by: INTERNAL MEDICINE

## 2021-04-27 PROCEDURE — 99233 SBSQ HOSP IP/OBS HIGH 50: CPT | Performed by: HOSPITALIST

## 2021-04-27 PROCEDURE — 85025 COMPLETE CBC W/AUTO DIFF WBC: CPT

## 2021-04-27 PROCEDURE — 33208 INSRT HEART PM ATRIAL & VENT: CPT | Performed by: FAMILY MEDICINE

## 2021-04-27 PROCEDURE — 99152 MOD SED SAME PHYS/QHP 5/>YRS: CPT | Performed by: INTERNAL MEDICINE

## 2021-04-27 PROCEDURE — 99152 MOD SED SAME PHYS/QHP 5/>YRS: CPT | Performed by: FAMILY MEDICINE

## 2021-04-27 PROCEDURE — 2580000003 HC RX 258

## 2021-04-27 PROCEDURE — 99231 SBSQ HOSP IP/OBS SF/LOW 25: CPT | Performed by: NURSE PRACTITIONER

## 2021-04-27 PROCEDURE — 2500000003 HC RX 250 WO HCPCS

## 2021-04-27 PROCEDURE — 6370000000 HC RX 637 (ALT 250 FOR IP): Performed by: HOSPITALIST

## 2021-04-27 PROCEDURE — 6370000000 HC RX 637 (ALT 250 FOR IP): Performed by: NURSE PRACTITIONER

## 2021-04-27 PROCEDURE — 36415 COLL VENOUS BLD VENIPUNCTURE: CPT

## 2021-04-27 PROCEDURE — 93005 ELECTROCARDIOGRAM TRACING: CPT | Performed by: INTERNAL MEDICINE

## 2021-04-27 RX ORDER — SODIUM CHLORIDE 0.9 % (FLUSH) 0.9 %
5-40 SYRINGE (ML) INJECTION PRN
Status: DISCONTINUED | OUTPATIENT
Start: 2021-04-27 | End: 2021-04-27

## 2021-04-27 RX ORDER — ASPIRIN 81 MG/1
81 TABLET ORAL DAILY
Status: DISCONTINUED | OUTPATIENT
Start: 2021-05-04 | End: 2021-04-27 | Stop reason: HOSPADM

## 2021-04-27 RX ORDER — SODIUM CHLORIDE 0.9 % (FLUSH) 0.9 %
5-40 SYRINGE (ML) INJECTION EVERY 12 HOURS SCHEDULED
Status: DISCONTINUED | OUTPATIENT
Start: 2021-04-27 | End: 2021-04-27

## 2021-04-27 RX ORDER — CHLORHEXIDINE GLUCONATE 4 G/100ML
SOLUTION TOPICAL ONCE
Status: COMPLETED | OUTPATIENT
Start: 2021-04-27 | End: 2021-04-27

## 2021-04-27 RX ORDER — SODIUM CHLORIDE 0.9 % (FLUSH) 0.9 %
5-40 SYRINGE (ML) INJECTION PRN
Status: DISCONTINUED | OUTPATIENT
Start: 2021-04-27 | End: 2021-04-27 | Stop reason: HOSPADM

## 2021-04-27 RX ORDER — SODIUM CHLORIDE 9 MG/ML
25 INJECTION, SOLUTION INTRAVENOUS PRN
Status: DISCONTINUED | OUTPATIENT
Start: 2021-04-27 | End: 2021-04-27

## 2021-04-27 RX ORDER — SODIUM CHLORIDE 0.9 % (FLUSH) 0.9 %
5-40 SYRINGE (ML) INJECTION EVERY 12 HOURS SCHEDULED
Status: DISCONTINUED | OUTPATIENT
Start: 2021-04-27 | End: 2021-04-27 | Stop reason: HOSPADM

## 2021-04-27 RX ORDER — SODIUM CHLORIDE 9 MG/ML
25 INJECTION, SOLUTION INTRAVENOUS PRN
Status: DISCONTINUED | OUTPATIENT
Start: 2021-04-27 | End: 2021-04-27 | Stop reason: HOSPADM

## 2021-04-27 RX ADMIN — CHLORHEXIDINE GLUCONATE: 4 SOLUTION TOPICAL at 12:41

## 2021-04-27 RX ADMIN — SODIUM CHLORIDE, PRESERVATIVE FREE 10 ML: 5 INJECTION INTRAVENOUS at 09:12

## 2021-04-27 RX ADMIN — MUPIROCIN: 20 OINTMENT TOPICAL at 12:40

## 2021-04-27 RX ADMIN — AMLODIPINE BESYLATE 5 MG: 5 TABLET ORAL at 08:47

## 2021-04-27 RX ADMIN — VANCOMYCIN HYDROCHLORIDE 1000 MG: 1 INJECTION, POWDER, LYOPHILIZED, FOR SOLUTION INTRAVENOUS at 14:50

## 2021-04-27 RX ADMIN — PANTOPRAZOLE SODIUM 40 MG: 40 TABLET, DELAYED RELEASE ORAL at 08:47

## 2021-04-27 RX ADMIN — ATORVASTATIN CALCIUM 20 MG: 20 TABLET, FILM COATED ORAL at 08:47

## 2021-04-27 ASSESSMENT — PAIN SCALES - GENERAL
PAINLEVEL_OUTOF10: 8
PAINLEVEL_OUTOF10: 0

## 2021-04-27 ASSESSMENT — PAIN DESCRIPTION - LOCATION: LOCATION: BACK

## 2021-04-27 ASSESSMENT — PAIN DESCRIPTION - DESCRIPTORS: DESCRIPTORS: ACHING

## 2021-04-27 ASSESSMENT — PAIN DESCRIPTION - PAIN TYPE: TYPE: ACUTE PAIN

## 2021-04-27 ASSESSMENT — PAIN - FUNCTIONAL ASSESSMENT: PAIN_FUNCTIONAL_ASSESSMENT: ACTIVITIES ARE NOT PREVENTED

## 2021-04-27 NOTE — CONSULTS
Office: [phone]       Fax: [phone]      Nephrology Progress Note        Patient's Name: Jurgen Chauhan  Admit Date: 4/23/2021  Date of Visit: 4/27/2021    Reason for Consult:  ERIK      Subjective:      Jurgen Chauhan is a 76 y.o. female with PMHx of hypertension, diabetes mellitus who was hospitalized on 4/23/2021 with complaints of chest pain   Home NSAID use : yes    INTERVAL HISTORY    Feels better  Shortness of breath: No   UOP: Fair  Creat: trending down  No dizziness     Medications: Allergies:  Codeine and Penicillins    Scheduled Meds:   sodium chloride flush  5-40 mL Intravenous 2 times per day    ceFAZolin (ANCEF) IVPB  2,000 mg Intravenous On Call to OR    chlorhexidine   Topical Once    mupirocin   Nasal Once    sodium chloride flush  5-40 mL Intravenous 2 times per day    amLODIPine  5 mg Oral Daily    aspirin EC  81 mg Oral Daily    atorvastatin  20 mg Oral Daily    fluticasone  2 spray Each Nostril Daily    pantoprazole  40 mg Oral Daily    insulin lispro  0-6 Units Subcutaneous TID WC    insulin lispro  0-3 Units Subcutaneous Nightly     Continuous Infusions:   sodium chloride      sodium chloride 50 mL/hr at 04/26/21 2130    sodium chloride      dextrose         Labs:  CBC:   Recent Labs     04/25/21  0631 04/26/21  0558 04/27/21  0428   WBC 7.9 7.4 7.2   HGB 11.1* 11.5* 11.0*    152 152     Ca/Mg/Phos:   Recent Labs     04/25/21  0631 04/26/21  0558 04/27/21  0428   CALCIUM 8.6 8.9 8.8     UA:  No results for input(s): [place], GLUCOSEU, BILIRUBINUR, KETUA, SPECGRAV, BLOODU, PHUR, PROTEINU, UROBILINOGEN, NITRU, LEUKOCYTESUR, Dewayne Passey in the last 72 hours. Urine Microscopic: No results for input(s): LABCAST, BACTERIA, COMU, HYALCAST, WBCUA, RBCUA, EPIU in the last 72 hours.   Urine Chemistry: Recent Labs     04/24/21  1330   LABCREA 64.4   PROTEINUR 5.00           Objective:     Vitals: BP (!) 160/62   Pulse 64   Temp 98.7 °F (37.1 °C) (Oral)   Resp 16   Ht 5' 4.5\" (1.638 m)   Wt 260 lb 9.3 oz (118.2 kg)   SpO2 100%   BMI 44.04 kg/m²    Wt Readings from Last 3 Encounters:   04/26/21 260 lb 9.3 oz (118.2 kg)   04/23/21 262 lb (118.8 kg)   01/22/21 253 lb (114.8 kg)      24HR INTAKE/OUTPUT:      Intake/Output Summary (Last 24 hours) at 4/27/2021 0847  Last data filed at 4/27/2021 0600  Gross per 24 hour   Intake 906 ml   Output 900 ml   Net 6 ml     Constitutional:  awake, NAD  HEENT:  MMM, No icterus  Neck: no bruits, No JVD  Cardiovascular:  reg rhythm  Respiratory: CTA, no crackles  Abdomen:  +BS, soft, NT, ND  Ext: no lower extremity edema  CNS: alert, no agitation    IMAGING:  XR CHEST (2 VW)   Final Result   No acute cardiopulmonary disease. Assessment :     1. ERIK on suspected CKD  -Non-Oliguric  -Baseline creat: [facility] Now 2.4->1.6-->1.1  -UA: prot, bld negative UPC 0.1  -Volume: hypoperfusion  -Electrolytes: No Dyskalemia  -Acid-Base: Metabolic alkalosis  -poor renal perfusion, home NSAID use    Recent Labs     04/25/21  0631 04/26/21  0558 04/27/21  0428   BUN 21* 19 13   CREATININE 0.9 1.1 0.9     Recent Labs     04/25/21  0631 04/26/21  0558 04/27/21  0428    141 140   K 4.1 3.9 3.8   CO2 25 26 29         2. HTN  -Blood pressure improving    BP Readings from Last 1 Encounters:   04/27/21 (!) 160/62       3. Complete heart block     4. chest pain   - TTE - pend    Plan:     - hemodynamic support  - plan for PPM today, per card  - may dc IVF  - Resume Diovan/HCTZ after PPM  - no NSAID (educated)  - Monitor BMP    -Monitor I/O, UOP  -Maintain MAP>65  -Avoid nephrotoxin, if able. -Dose meds to current eGFR    Thank you for allowing us to participate in care of Elizabeth Enciso . We will continue to follow. Feel free to contact me with any questions.       Yohannes Meza Pascual Daley  4/27/2021    [facility] [address]  Office : [phone]  Fax :[phone]

## 2021-04-27 NOTE — DISCHARGE SUMMARY
Hospital Medicine Discharge Summary    Patient ID: Sarai Stevens      Patient's PCP: Segundo Parry MD    Admit Date: 4/23/2021     Discharge Date:   4/27/2021    Admitting Physician: Baldemar Belle MD     Discharge Physician: Logan Zaldivar MD     Discharge Diagnoses: Active Hospital Problems    Diagnosis Date Noted    Precordial chest pain [R07.2]     ERIK (acute kidney injury) ([facility] [address] [N17.9]     Chest pain [R07.9]     Complete heart block ([address] [I44.2] 04/23/2021       The patient was seen and examined on day of discharge and this discharge summary is in conjunction with any daily progress note from day of discharge. Hospital Course: 76 y.o. female presents with a week of chest pressure worsening with activity, orthostatic lightheadedness/ dizziness sob she attributed to acute bronchitis. She denies fever, chills, noted some lower extremity swelling R>L last week which has resolved. She had a routine follow up with her PCP today who advised ER consultation following an EKG performed outpatient. Patient is currently seen in the ER with HR 30's, complete block on tele, asymptomatic  Denies urinary hesitancy, change in meds, dysuria. Notes decreased PO intake, denies abd pain, nausea, vomiting, diarrhea      1) Chest pressure  - trops neg, cardio consulted  - catherization per Cardiology 4/26 with no significant disease  - on ASA/statin, no BB 2/2 heart block     2) av dissociation/heart block s/p PPM  - cardio consulted, EP eval  - pacemaker 4/27 with EP  - holding beta blocker     3) ARF  - resolved following rehydration  - nephrology consulted     4) DM II  - continue corrective iss, controlled         Exam:     /67   Pulse 79   Temp 98.6 °F (37 °C) (Oral)   Resp 16   Ht 5' 4.5\" (1.638 m)   Wt 260 lb 9.3 oz (118.2 kg)   SpO2 97%   BMI 44.04 kg/m²       General appearance:  No apparent distress, appears stated age and cooperative.   HEENT:  Normal cephalic, atraumatic without obvious deformity. Pupils equal, round, and reactive to light. Extra ocular muscles intact. Conjunctivae/corneas clear. Neck: Supple, with full range of motion. No jugular venous distention. Trachea midline. Respiratory:  Normal respiratory effort. Clear to auscultation, bilaterally without Rales/Wheezes/Rhonchi. Cardiovascular:  Regular rate and rhythm with normal S1/S2 without murmurs, rubs or gallops. Abdomen: Soft, non-tender, non-distended with normal bowel sounds. Musculoskeletal:  No clubbing, cyanosis or edema bilaterally. Full range of motion without deformity. Skin: Skin color, texture, turgor normal.  No rashes or lesions. Neurologic:  Neurovascularly intact without any focal sensory/motor deficits. Cranial nerves: II-XII intact, grossly non-focal.  Psychiatric:  Alert and oriented, thought content appropriate, normal insight  Capillary Refill: Brisk,< 3 seconds   Peripheral Pulses: +2 palpable, equal bilaterally       Labs: For convenience and continuity at follow-up the following most recent labs are provided:      CBC:    Lab Results   Component Value Date    WBC 7.2 04/27/2021    HGB 11.0 04/27/2021    HCT 33.4 04/27/2021     04/27/2021       Renal:    Lab Results   Component Value Date     04/27/2021    K 3.8 04/27/2021    K 4.1 04/25/2021     04/27/2021    CO2 29 04/27/2021    BUN 13 04/27/2021    CREATININE 0.9 04/27/2021    CALCIUM 8.8 04/27/2021         Significant Diagnostic Studies    Radiology:   XR CHEST (2 VW)   Final Result   No acute cardiopulmonary disease.          XR CHEST (2 VW)    (Results Pending)          Consults:     IP CONSULT TO HOSPITALIST  IP CONSULT TO CARDIOLOGY  IP CONSULT TO SPIRITUAL SERVICES    Disposition:  home     Condition at Discharge: Stable    Discharge Instructions/Follow-up:  meds as prescribed, follow-up with PCP    Code Status:  Full Code     Activity: activity as tolerated    Diet: low sodium diet      Discharge Medications: daily      albuterol sulfate HFA (PROVENTIL HFA) 108 (90 Base) MCG/ACT inhaler Inhale 2 puffs into the lungs every 6 hours as needed for Wheezing  Qty: 1 Inhaler, Refills: 5      blood glucose monitor strips Dispense glucose strips covered by insurance and compatible with her glucose monitor  AIC 8.2  Test glucose twice a day  Qty: 200 strip, Refills: 5    Associated Diagnoses: Type 2 diabetes mellitus without complication, without long-term current use of insulin (HCC)      estradiol (ESTRING) 2 MG vaginal ring Place 2 mg vaginally      tacrolimus (PROTOPIC) 0.1 % ointment Apply topically to the face 2 times daily if needed for rosacea. Qty: 30 g, Refills: 0      clobetasol (TEMOVATE) 0.05 % ointment Apply to affected area on the left thigh twice daily for up to 2 weeks or until improved. Qty: 45 g, Refills: 2      mometasone (NASONEX) 50 MCG/ACT nasal spray SHAKE WELL AND USE 2 SPRAYS NASALLY DAILY  Qty: 34 g, Refills: 5             Time Spent on discharge is more than 30 minutes in the examination, evaluation, counseling and review of medications and discharge plan. Signed:    Abundio Fleming MD   4/27/2021      Thank you Melania Cody MD for the opportunity to be involved in this patient's care. If you have any questions or concerns please feel free to contact me at [phone].

## 2021-04-27 NOTE — PROCEDURES
[address]     Electrophysiology Procedure Note       Date of Procedure: 4/27/2021  Patient's Name: Vikas Guardado  YOB: 1955   Medical Record Number: [id number]  Procedure Performed by: Katia Avitia MD    Procedures performed:  · Insertion of MRI compatible right ventricular pacing lead under fluoroscopy. · Insertion of MRI compatible right atrial lead under fluoroscopy  · Insertion of a MRI compatible dual chamber Pacemaker. · Electronic analysis of lead and device. · Anesthesia: Local and Monitored Anesthesia Care  · Level of sedation plan: Moderate sedation (conscious sedation) with intravenous Midazolam 4 mg and Fentanyl 100 mcg   · Sedation start time: 1450  · Sedation stop time: 1545  · Mallampati airway assessment class: I  · ASA class: 1  · (there were no independent trained observers who had no other duties involved in this procedure)    Indication of the procedure:       Vikas Guardado is a 76 y.o. female who is being referred for pacemaker implantation due to non-reversible bradycardia secondary to sinus rhythm with complete heart block and junctional escape rhythm with v-rates 30-40 bpm with symptoms of chest pain, dizziness. Details of procedure: The patient was brought to the electrophysiology laboratory in stable condition. The patient was in a fasting and non-sedated state. The risks, benefits and alternatives of the procedure were discussed with the patient. The risks including, but not limited to, the risks of vascular injury, bleeding, infection, device malfunction, lead dislodgement, radiation exposure, injury to cardiac and surrounding structures (including pneumothorax), stroke, myocardial infarction and death were discussed in detail. The patient was also counseled at length about the risks of grecia Covid-19 in the arabella-operative and post-operative states including the recovery window of their procedure.  The patient was made aware that grecia Covid-19 after a surgical procedure may worsen their prognosis for recovering from the virus and lend to a higher morbidity and or mortality risk. The patient was given the option of postponing their procedure. The patient opted to proceed with the device implantation. Written informed consent was signed and placed in the chart. Prophylactic antibiotic using Ancef 1mg IV was given. The patient was prepped and draped in sterile fashion. A timeout protocol was completed to identify the patient and the procedure being performed. An independent trained observer assumed the sole responsibility of administering IV sedation medication - Versed, Fentanyl - at my direction and closely monitored the patient. The patient was monitored continuously with ECG, pulse oximetry, blood pressure monitoring, and direct observation. An incision was made in the left upper pectoral area in a transverse line roughly 1 cm from the clavicle after administration of lidocaine/bupivicaine combination. Using electrocautery and blunt dissection, a pocket was created. Central venous access into the left axillary vein was obtained using the modified Seldinger technique. After central venous access was obtained, a sheath was placed in the axillary vein. A right ventricular lead was advanced into the apical position under fluoroscopic guidance and using a series of curved and straight stylets. The lead was actively fixated. After confirming appropriate function and no diaphragmatic stimulation at maximum output, the sheath was split and removed. The lead was secured to the underlying tissue using suture material.      A new sheath was advanced over a second previously placed wire into the vein. The atrial lead was advanced to the right atrial appendage and actively fixated under fluoroscopic guidance. After confirming appropriate function and no diaphragmatic stimulation at maximum output, the sheath was split and removed.  The lead was secured to the underlying tissue using suture. The leads were then connected to the new pulse generator which was then placed into the pocket. Copious amount (> 200 cc) of saline flush was used to irrigate the pocket. The pocket was then closed using a 2-0 Vicryl subcutaneous layer and a subcuticular layer using 4-0 Vicryl. The skin was covered with Steri-Strips and pressure dressing. All sponge and needle counts were reported as correct at the end of the procedure. Specimen collected: none    Estimated blood loss: < 20 cc    The patient tolerated the procedure well and there were no complications. Patient was transported to the holding area in stable condition. Device and Leads information:        Impression:    Pre- and post-procedural diagnoses of non-reversible bradycardia secondary to sinus rhythm with complete heart block and junctional escape rhythm with v-rates 30-40 bpm with symptoms of chest pain, dizziness with successful implantation of a [employer]onic, MRI-compatible, 2-chamber PPM.     Plan:     The patient will be observed and receive the usual post-implant care, including chest x-ray, intravenous antibiotic therapy, and interrogation of the device. From an EP perspective, if the interrogation and CXR are showing appropriate functioning, parameters and placement, the patient may be discharged from the hospital today with a Carelink device. Follow-up will be a 1-week wound check with our nurse in the [facility] and a 3-month follow-up with the EP NP. The ASA will be held for 7 days, then resumed. Thank you for allowing us to participate in the care of your patient. If you have any questions, please do not hesitate to contact me.     Timothy Closs, MD, MS, [facility], [facility]  Cardiac Electrophysiology  [address]  [address] [facility], [facility]  Brid[address] Arshad Florent[address] Conley [address]  [phone]

## 2021-04-27 NOTE — FLOWSHEET NOTE
04/27/21 1433   Encounter Summary   Services provided to: Patient and family together   Referral/Consult From: Nurse   Support System Children;Family members;Friends/neighbors   Continue Visiting   (visit and completed HCPOA and LW docs 4/27 CL)   Complexity of Encounter Low   Length of Encounter 45 minutes   Spiritual/Pentecostalism   Type Spiritual support   Assessment Calm; Approachable; Hopeful;Coping   Intervention Active listening;Explored feelings, thoughts, concerns;Prayer;Discussed belief system/Sabianist practices/brennan;Discussed illness/injury and it's impact   Outcome Engaged in conversation;Coping; Hopeful   Advance Directives (For Healthcare)   Healthcare Directive Yes, patient has an advance directive for healthcare treatment   Type of Healthcare Directive Durable power of  for health care;Living will   Copy in Chart Yes, copy in chart  (copy in soft chart)   Chart Copy Status : Active   Date Reviewed and Current: 04/27/21   [address] power of    Healthcare Agent's Name Chung Geovani  (alt: Gretchen Fine and Catrachito Mandujano)   Healthcare Agent's Phone Number [phone]; [phone]  Albina Mtz [phone], Tracy Carvajal [phone])   Electronically signed by Nahum Hines on 4/27/2021 at 2:37 PM

## 2021-04-27 NOTE — PRE SEDATION
polyethylene glycol  Home Meds:   Prior to Admission medications    Medication Sig Start Date End Date Taking?  Authorizing Provider   valsartan-hydroCHLOROthiazide (DIOVAN-HCT) 320-25 MG per tablet Take 1 tablet by mouth daily 3/17/21  Yes David Willis MD   atorvastatin (LIPITOR) 20 MG tablet TAKE ONE TABLET BY MOUTH DAILY 3/15/21  Yes David Willis MD   metoprolol tartrate (LOPRESSOR) 25 MG tablet TAKE 1 TABLET BY MOUTH TWICE DAILY  Patient taking differently: Take 50 mg by mouth nightly  3/15/21  Yes David Willis MD   amLODIPine (NORVASC) 5 MG tablet TAKE 1 TABLET BY MOUTH DAILY 2/2/21  Yes David Willis MD   pantoprazole (PROTONIX) 40 MG tablet TAKE 1 TABLET BY MOUTH DAILY 2/2/21  Yes David Willis MD   minocycline (MINOCIN;DYNACIN) 50 MG capsule Take 1 capsule by mouth 2 times daily 1/27/21  Yes Kelly Roberts MD   pioglitazone (ACTOS) 30 MG tablet Take 1 tablet by mouth daily 1/24/21  Yes David Willis MD   canagliflozin-metformin HCl (INVOKAMET) 150-500 MG TAKE 1 TABLET BY MOUTH TWICE DAILY WITH MEALS  Patient taking differently: TAKE 1 TABLET BY MOUTH EVERY NIGHT 1/22/21  Yes David Willis MD   Dulaglutide (TRULICITY) 7.73 OK/3.4HX SOPN ADMINISTER 0.5 ML UNDER THE SKIN 1 TIME WEEKLY 1/22/21  Yes David Willis MD   glimepiride (AMARYL) 4 MG tablet TAKE 1 TABLET BY MOUTH EVERY MORNING 1/22/21  Yes David Willis MD   cloNIDine (CATAPRES) 0.2 MG tablet TAKE 1 TABLET BY MOUTH TWICE DAILY  Patient taking differently: Take 0.2 mg by mouth nightly  12/5/20  Yes David Willis MD   oxaprozin (DAYPRO) 600 MG tablet TAKE 1 TABLET BY MOUTH EVERY DAY 9/1/20  Yes David Willis MD   aspirin 81 MG EC tablet Take 1 tablet by mouth daily 10/18/19  Yes Jatinder Bermudez MD   meloxicam (MOBIC) 15 MG tablet Take 15 mg by mouth daily 2/9/21   Historical Provider, MD   albuterol sulfate HFA (PROVENTIL HFA) 108 (90 Base) MCG/ACT inhaler Inhale 2 puffs into the lungs every 6 hours as needed for Wheezing 4/19/21   Wilmar Dose Maxi Rojas MD   blood glucose monitor strips Dispense glucose strips covered by insurance and compatible with her glucose monitor  AIC 8.2  Test glucose twice a day 1/22/21   Elen Elmore MD   estradiol (ESTRING) 2 MG vaginal ring Place 2 mg vaginally 2/25/19   Historical Provider, MD   tacrolimus (PROTOPIC) 0.1 % ointment Apply topically to the face 2 times daily if needed for rosacea. 7/23/19   Humberto Mcpherson MD   clobetasol (TEMOVATE) 0.05 % ointment Apply to affected area on the left thigh twice daily for up to 2 weeks or until improved. 5/3/18   Humberto Mcpherson MD   mometasone (NASONEX) 50 MCG/ACT nasal spray SHAKE WELL AND USE 2 SPRAYS NASALLY DAILY 10/4/17   Elen Elmore MD     Coumadin Use Last 7 Days:  no  Antiplatelet drug therapy use last 7 days: yes - ASA  Other anticoagulant use last 7 days: no  Additional Medication Information:  n/a      Pre-Sedation Documentation and Exam:   I have personally completed a history, physical exam & review of systems for this patient (see notes).     Mallampati Airway Assessment:  Mallampati Class I - (soft palate, fauces, uvula & anterior/posterior tonsillar pillars are visible)    Prior History of Anesthesia Complications:   none    ASA Classification:  Class 2 - A normal healthy patient with mild systemic disease    Sedation/ Anesthesia Plan:   intravenous sedation    Medications Planned:   midazolam (Versed) intravenously and fentanyl intravenously    Patient is an appropriate candidate for plan of sedation: yes    Electronically signed by Niko Coyne MD on 4/27/2021 at 2:36 PM

## 2021-04-27 NOTE — PROGRESS NOTES
Cardiac Electrophysiology Progress Note     Admit Date: 2021     Reason for follow up: complete heart block    HPI and Interval History:   Cammie Quiles is a 76y.o. year old female with past medical history significant for HTN, HLD, VEL and DM who presented to the ED from her PCP's office for chest pain, SOB and complete heart block seen on EKG. Her metoprolol has been held but she continues to have CHB with intermittent 1:1 AV conduction. LHC yesterday with normal coronaries. Echo with normal EF. She is feeling somewhat better today, says her chest pain is resolved. Denies any dizziness currently. Physical Examination:  Vitals:    21 0900   BP: (!) 167/78   Pulse: (!) 41   Resp: 18   Temp:    SpO2: 100%        Intake/Output Summary (Last 24 hours) at 2021 0933  Last data filed at 2021 0600  Gross per 24 hour   Intake 906 ml   Output 900 ml   Net 6 ml     In: 906 [P.O.:480; I.V.:426]  Out: 900    Wt Readings from Last 3 Encounters:   21 260 lb 9.3 oz (118.2 kg)   21 262 lb (118.8 kg)   21 253 lb (114.8 kg)     Temp  Av.6 °F (37 °C)  Min: 98.4 °F (36.9 °C)  Max: 98.8 °F (37.1 °C)  Pulse  Av.8  Min: 40  Max: 116  BP  Min: 89/62  Max: 167/78  SpO2  Av %  Min: 92 %  Max: 100 %    · Telemetry: Sinus rhythm with CHB, v-rates 50s. · Constitutional: Alert, in no acute distress. Appears stated age. · Head: Normocephalic and atraumatic. · Eyes: Conjunctivae normal. EOM are normal.   · Neck: Neck supple. No lymphadenopathy. No rigidity. No JVD present. · Cardiovascular: Normal rate, regular rhythm. No murmurs, rubs or gallops. No S3 or S4.  · Pulmonary/Chest: Clear breath sounds bilaterally. No crackles, wheezes or rhonchi. No respiratory accessory muscle use. · Abdominal: Soft. Normal bowel sounds present. No distension, No tenderness. · Musculoskeletal: No tenderness. No edema    · Lymphadenopathy: Has no cervical adenopathy.    · Neurological: Alert and oriented. No gross deficits. · Skin: Skin is warm and dry. No rash, lesions, ulcerations noted. · Psychiatric: No anxiety or agitation. Labs, diagnostic and imaging results reviewed. Reviewed. Recent Labs     04/25/21  0631 04/26/21  0558 04/27/21  0428    141 140   K 4.1 3.9 3.8    104 105   CO2 25 26 29   BUN 21* 19 13   CREATININE 0.9 1.1 0.9     Recent Labs     04/25/21  0631 04/26/21  0558 04/27/21  0428   WBC 7.9 7.4 7.2   HGB 11.1* 11.5* 11.0*   HCT 34.1* 34.4* 33.4*   MCV 88.6 88.4 88.3    152 152     Lab Results   Component Value Date    CKTOTAL 216 12/28/2012    CKMB 2.0 08/15/2012    CKMBINDEX 1 08/15/2012    TROPONINI <0.01 04/24/2021     Estimated Creatinine Clearance: 78 mL/min (based on SCr of 0.9 mg/dL).    No results found for: BNP  Lab Results   Component Value Date    PROTIME 11.1 02/09/2018    PROTIME 12.1 10/22/2013    PROTIME 11.2 09/12/2012    INR 0.98 02/09/2018    INR 1.08 10/22/2013    INR 1.1 09/12/2012     Lab Results   Component Value Date    CHOL 158 05/27/2016    HDL 43 05/27/2016    HDL 46 01/30/2012    TRIG 88 05/27/2016       Scheduled Meds:   sodium chloride flush  5-40 mL Intravenous 2 times per day    ceFAZolin (ANCEF) IVPB  2,000 mg Intravenous On Call to OR    chlorhexidine   Topical Once    mupirocin   Nasal Once    sodium chloride flush  5-40 mL Intravenous 2 times per day    amLODIPine  5 mg Oral Daily    aspirin EC  81 mg Oral Daily    atorvastatin  20 mg Oral Daily    fluticasone  2 spray Each Nostril Daily    pantoprazole  40 mg Oral Daily    insulin lispro  0-6 Units Subcutaneous TID     insulin lispro  0-3 Units Subcutaneous Nightly     Continuous Infusions:   sodium chloride      sodium chloride 50 mL/hr at 04/26/21 2130    sodium chloride      dextrose       PRN Meds:sodium chloride flush, sodium chloride, perflutren lipid microspheres, sodium chloride flush, sodium chloride, acetaminophen, nitroGLYCERIN, albuterol sulfate HFA, glucose, dextrose, glucagon (rDNA), dextrose, promethazine **OR** ondansetron, polyethylene glycol     EC21  SR with CHB. V-rate 36 BPM.     Echo:21   Suboptimal image quality. Left ventricular cavity size is normal.   Ejection fraction is visually estimated to be 60%. No regional wall motion abnormalities are noted. Normal diastolic function. The left atrium is dilated. The right ventricle is dilated. Right ventricular systolic function is normal.    Stress: 18  Abnormal study. There is a large sized, moderate intensity, reversible    defect of the basal to mid anterolateral, mid inferolateral, and apical    lateral walls which is consistent with ischemia.    There is breast attenuation but stress images appear worse.    Normal LV size and systolic function.    Uncontrolled hypertension.    Overall findings represent a high risk study. LHC: 21  ANGIOGRAPHY FINDINGS:  1. Normal left main coronary artery. 2.  Normal left anterior descending artery. 3.  Normal left circumflex artery. 4.  Normal right coronary artery. 5.  LV ejection fraction 60%.     SUMMARY:  Normal coronary arteries.     Assessment and Plan:     Complete heart block              - noted on EKG from               - symptomatic with CP, BERGMAN, fatigue and lightheaded              - metoprolol has been on hold with last dose on  but continues to have CHB on monitor, some intermittent 1:1 AV conduction              - LHC with normal coronaries              - meets Class I indications for permanent pacing with symptomatic CHB - to be done this afternoon by Dr. Davy Irvin     Chest pain              - ruled out for ACS              - LHC with normal coronaries     ERIK               - likely secondary to hypoperfusion from CHB/hypotension              - nephrology following     Essential HTN              - BP a little elevated this morning, would continue to monitor and resume home meds as appropriate      JANIA Villanueva  [facility] [address] [place] [address], [address] [place] [address]  Phone: [phone]  Fax: [phone]    Electronically signed by JANIA Amaro CNP on 4/27/2021 at 9:33 AM

## 2021-04-27 NOTE — PROGRESS NOTES
site. Pt aware to leave white dressing in place until follow up appointment and to not submerge/wet site.     Electronically signed by Deepika France RN on 4/27/2021 at 6:48 PM

## 2021-04-27 NOTE — H&P
H&P Update    I have reviewed the history and physical from HENRI Gifford NP, on 2021 and examined the patient and find no relevant changes. I have reviewed with the patient and/or family the risks, benefits, and alternatives to the procedure. Pre-sedation Assessment    Patient:  Mandi Tena   :   1955  Intended Procedure: 2-ch PPM implantation      Wendi Lennox nurses notes reviewed and agreed. Medications reviewed  Allergies: Allergies   Allergen Reactions    Codeine Itching    Penicillins Itching         Pre-Procedure Assessment/Plan:  ASA 2 - Patient with mild systemic disease with no functional limitations    Level of Sedation Plan: Moderate sedation    Post Procedure plan: Return to same level of care

## 2021-04-27 NOTE — PROGRESS NOTES
Hospitalist Progress Note      PCP: Judy De La Paz MD    Date of Admission: 4/23/2021    Subjective: Pt seen and examined. Feels well, has no acute complaints. Medications:  Reviewed    Infusion Medications    sodium chloride      sodium chloride 50 mL/hr at 04/26/21 2130    sodium chloride      dextrose       Scheduled Medications    sodium chloride flush  5-40 mL Intravenous 2 times per day    ceFAZolin (ANCEF) IVPB  2,000 mg Intravenous On Call to OR    sodium chloride flush  5-40 mL Intravenous 2 times per day    amLODIPine  5 mg Oral Daily    aspirin EC  81 mg Oral Daily    atorvastatin  20 mg Oral Daily    fluticasone  2 spray Each Nostril Daily    pantoprazole  40 mg Oral Daily    insulin lispro  0-6 Units Subcutaneous TID WC    insulin lispro  0-3 Units Subcutaneous Nightly     PRN Meds: sodium chloride flush, sodium chloride, perflutren lipid microspheres, sodium chloride flush, sodium chloride, acetaminophen, nitroGLYCERIN, albuterol sulfate HFA, glucose, dextrose, glucagon (rDNA), dextrose, promethazine **OR** ondansetron, polyethylene glycol      Intake/Output Summary (Last 24 hours) at 4/27/2021 1341  Last data filed at 4/27/2021 0600  Gross per 24 hour   Intake 906 ml   Output 900 ml   Net 6 ml       Physical Exam Performed:    BP (!) 154/69   Pulse 50   Temp 98.1 °F (36.7 °C) (Oral)   Resp 18   Ht 5' 4.5\" (1.638 m)   Wt 260 lb 9.3 oz (118.2 kg)   SpO2 100%   BMI 44.04 kg/m²     General appearance: No apparent distress, appears stated age and cooperative. HEENT: Pupils equal, round, and reactive to light. Conjunctivae/corneas clear. Neck: Supple, with full range of motion. No jugular venous distention. Trachea midline. Respiratory:  Normal respiratory effort. No use of accessory muscles, no intercostal retractions  Cardiovascular:soy rate, regular rhythm  Abdomen: Soft, non-tender, non-distended , obese  Musculoskeletal: No clubbing, cyanosis or edema bilaterally. lovenox  Diet: Diet NPO, After Midnight  Code Status: Full Code    PT/OT Eval Status: ordered    Dispo - cont care, possibly Yessi Mendes MD

## 2021-04-27 NOTE — FLOWSHEET NOTE
04/27/21 1047   Encounter Summary   Services provided to: Patient and family together   Referral/Consult From: Other disciplines  ()   Elly Children;Family members;Friends/neighbors   Continue Visiting   (visit, prayer, AD discussion 4/27 CL)   Complexity of Encounter Moderate   Length of Encounter 30 minutes   Spiritual/Episcopalian   Type Spiritual support   Assessment Calm; Approachable; Hopeful;Coping   Intervention Active listening;Explored feelings, thoughts, concerns;Prayer;Discussed relationship with God;Discussed belief system/Jain practices/brennan;Discussed illness/injury and it's impact   Outcome Engaged in conversation;Coping; Hopeful   Advance Directives (For Healthcare)   Healthcare Directive No, patient does not have an advance directive for healthcare treatment   Advance Directives Documents given;Documents explained  (AD doc protocol complete)   Electronically signed by Bhavana Erickson on 4/27/2021 at 10:51 AM

## 2021-04-28 LAB
EKG ATRIAL RATE: 89 BPM
EKG DIAGNOSIS: NORMAL
EKG P AXIS: 47 DEGREES
EKG P-R INTERVAL: 190 MS
EKG Q-T INTERVAL: 452 MS
EKG QRS DURATION: 172 MS
EKG QTC CALCULATION (BAZETT): 549 MS
EKG R AXIS: -75 DEGREES
EKG T AXIS: 73 DEGREES
EKG VENTRICULAR RATE: 89 BPM

## 2021-04-28 PROCEDURE — 93010 ELECTROCARDIOGRAM REPORT: CPT | Performed by: INTERNAL MEDICINE

## 2021-04-29 ENCOUNTER — TELEPHONE (OUTPATIENT)
Dept: PRIMARY CARE CLINIC | Age: 66
End: 2021-04-29

## 2021-04-30 ENCOUNTER — TELEPHONE (OUTPATIENT)
Dept: PRIMARY CARE CLINIC | Age: 66
End: 2021-04-30

## 2021-04-30 NOTE — TELEPHONE ENCOUNTER
----- Message from Rickford Memory sent at 4/28/2021  1:20 PM EDT -----  Subject: Hospital Follow Up    QUESTIONS  What hospital was the Patient Discharged from? [facility]  Date of Discharge? 2021-04-27  Discharge Location? Home  Reason for hospitalization as patient stated? Dr. Cheri Almanza sent her to the   hospital for EKG for a blockage. Pt now has a pacemaker as of yesterday. What question does the patient have, if applicable? Pt just received her   pacemaker yesterday and would like to know if she can get her 2nd covid   vaccine Saturday May 1st or should reschedule her appointment.   ---------------------------------------------------------------------------  --------------  [address]  What is the best way for the office to contact you? OK to leave message on   voicemail  Preferred Call Back Phone Number? [phone]    SCRIPT ANSWERS  Relationship to Patient? Self  Appointment reason? Well Care/Follow Ups  Select a Well Care/Follow Ups appointment reason? Adult Hospital Follow Up   [Inpatient Discharge, [address]  (Patient requests to see provider urgently. )? No  (Has the patient been discharged from the hospital within 2 business days   AND does not have a Telephone Encounter - Follow Up From Hospital   documented in [address]?)? Yes  Do you have any questions for your primary care provider that need to be   answered prior to your appointment? (Use RN Triage if question pertains to   anything on the red flag list)? Yes  (Patient needs follow up visit after hospital discharge) Book first   available appointment within 7 days OF DISCHARGE, if no appt, proceed to   book the next available time slot within 14 days OF DISCHARGE AND Send   Message to Provider. [address] [facility] Follow Up appointment cannot be booked beyond 14 Days and should result in a Message to Provider. ?  Yes

## 2021-05-03 ENCOUNTER — OFFICE VISIT (OUTPATIENT)
Dept: PRIMARY CARE CLINIC | Age: 66
End: 2021-05-03
Payer: COMMERCIAL

## 2021-05-03 VITALS
SYSTOLIC BLOOD PRESSURE: 136 MMHG | DIASTOLIC BLOOD PRESSURE: 81 MMHG | HEIGHT: 65 IN | TEMPERATURE: 97.3 F | HEART RATE: 77 BPM | BODY MASS INDEX: 43.15 KG/M2 | WEIGHT: 259 LBS

## 2021-05-03 DIAGNOSIS — N17.9 AKI (ACUTE KIDNEY INJURY) (HCC): ICD-10-CM

## 2021-05-03 DIAGNOSIS — I44.2 COMPLETE HEART BLOCK (HCC): Primary | ICD-10-CM

## 2021-05-03 PROCEDURE — 1111F DSCHRG MED/CURRENT MED MERGE: CPT | Performed by: NURSE PRACTITIONER

## 2021-05-03 PROCEDURE — 99495 TRANSJ CARE MGMT MOD F2F 14D: CPT | Performed by: NURSE PRACTITIONER

## 2021-05-03 ASSESSMENT — ENCOUNTER SYMPTOMS
COUGH: 0
SHORTNESS OF BREATH: 0

## 2021-05-03 NOTE — PATIENT INSTRUCTIONS
Follow up with cardiology and nephrology as arranged  Follow up with Dr Nelson Mckeon for routine diabetes follow up

## 2021-05-03 NOTE — PROGRESS NOTES
Date: 5/3/2021                                               Subjective/Objective:     Chief Complaint   Patient presents with    Follow-Up from Hospital     chest pain       HPI     Franc Mccarthy is a 75 yo female, patient of Dr Catina Pop. Visit today for hospital follow up. She was evaluated by PCP in office 4/23 for chest tightness and SOB. She was found to have complete heart block on EKG and was sent to ER. She had LHC 4/26 that showed normal coronary arteries and EF 60%. She had PPM placed 4/27. She was noted to have ERIK which resolved with IVF. She was discharged home 4/27. Has arranged follow up with cardiology and nephrology. Reports that since hospital discharge she has been feeling well. She denies any further chest pain or shortness of breath. Left chest wall pacemaker insertion site with dressing that is clean and without notable drainage. Reports right wrist injury for left heart cath is healed.           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    Degenerative arthritis of hip 08/10/2012    Numbness of fingers 04/16/2012    Hyperlipidemia 04/16/2012    Elevated blood sugar 04/16/2012    History of urinary tract infection 01/16/2012    Hypertension 09/08/2011    Arthritis 09/08/2011       Past Medical History:   Diagnosis Date    Diabetes mellitus ([address]     GERD (gastroesophageal reflux disease)     Hyperlipidemia     Hypertension     Obesity     aspirin 81 MG EC tablet Take 1 tablet by mouth daily 30 tablet 0    tacrolimus (PROTOPIC) 0.1 % ointment Apply topically to the face 2 times daily if needed for rosacea. 30 g 0    clobetasol (TEMOVATE) 0.05 % ointment Apply to affected area on the left thigh twice daily for up to 2 weeks or until improved. 45 g 2    mometasone (NASONEX) 50 MCG/ACT nasal spray SHAKE WELL AND USE 2 SPRAYS NASALLY DAILY 34 g 5     No current facility-administered medications for this visit. Allergies   Allergen Reactions    Codeine Itching    Penicillins Itching       Review of Systems   Constitutional: Negative for chills and fever. Respiratory: Negative for cough and shortness of breath. Cardiovascular: Negative for chest pain and leg swelling. Skin:        Left chest wall surgical site   Neurological: Negative for dizziness. All other systems reviewed and are negative. Vitals:  /81   Pulse 77   Temp 97.3 °F (36.3 °C) (Temporal)   Ht 5' 4.5\" (1.638 m)   Wt 259 lb (117.5 kg)   BMI 43.77 kg/m²     Physical Exam  Vitals signs reviewed. Constitutional:       General: She is not in acute distress. Appearance: Normal appearance. HENT:      Head: Normocephalic and atraumatic. Cardiovascular:      Rate and Rhythm: Normal rate and regular rhythm. Heart sounds: Normal heart sounds. No murmur. Musculoskeletal: Normal range of motion. Right lower leg: No edema. Left lower leg: No edema. Skin:     General: Skin is warm and dry. Neurological:      General: No focal deficit present. Mental Status: She is alert and oriented to person, place, and time. Psychiatric:         Mood and Affect: Mood normal.         Thought Content: Thought content normal.         Judgment: Judgment normal.         Assessment/Plan     1. Complete heart block [facility]): s/p PPM placement. Has follow-up with cardiology tomorrow. Reviewed medications with patient.   - [place] DISCHARGE MEDS RECONCILED W/ CURRENT OUTPATIENT MED LIST    2. ERIK (acute kidney injury) ([facility]): Creatinine at time of discharge was 1.1. Has follow-up with nephrology already arranged. Advised to avoid use of NSAIDs. Has follow-up labs ordered per nephrology. - [place] DISCHARGE MEDS RECONCILED W/ CURRENT OUTPATIENT MED LIST      Orders Placed This Encounter   Procedures    [place] DISCHARGE MEDS RECONCILED W/ CURRENT OUTPATIENT MED LIST       No follow-ups on file. OR sooner with questions, concerns, worsening symptoms    DARRELL HDEZ NP    5/3/2021  8:32 AM    Discussed use, benefit, and side effects of prescribed medications. Barriers to medication compliance addressed. Discussed all ordered testing and labs. All patient questions answered. Patient agreeable with plan above. Please note that this chart was generated using dragon dictation software. Although every effort was made to ensure the accuracy of this automated transcription, some errors in transcription may have occurred.

## 2021-05-04 ENCOUNTER — NURSE ONLY (OUTPATIENT)
Dept: CARDIOLOGY CLINIC | Age: 66
End: 2021-05-04
Payer: COMMERCIAL

## 2021-05-04 DIAGNOSIS — I44.2 COMPLETE HEART BLOCK (HCC): ICD-10-CM

## 2021-05-04 DIAGNOSIS — Z95.0 PACEMAKER: Primary | ICD-10-CM

## 2021-05-04 PROCEDURE — 93280 PM DEVICE PROGR EVAL DUAL: CPT | Performed by: INTERNAL MEDICINE

## 2021-05-04 NOTE — PROGRESS NOTES
Pt seen in clinic today for cardiac device interrogation and site check 1 week post implant. Site appears to be healing well, no swelling, minimal bruising, skin surrounding site appears irritated from bandage adhesive, temp is consistent with surrounding area. Steri strips left in place. Their device is a MDT 2 chamber ppm     Based on threshold, impedance, and intrinsic sensing tests run today, the device appears to be functioning with minimal deviation from test findings at implant. 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 Pt asked to contact me with any questions or forms needed. Pt was informed of findings today and general questions have been answered with regard to device. Home monitoring hardware has been acquired and set up at home. Results discussed with or to be reviewed by Dr. Rita Au.

## 2021-05-17 ENCOUNTER — TELEPHONE (OUTPATIENT)
Dept: CARDIOLOGY CLINIC | Age: 66
End: 2021-05-17

## 2021-05-17 NOTE — TELEPHONE ENCOUNTER
Hong Cesar called in this afternoon stating that she needs a D.O.T. clearance letter.          Hong Cesar can be reached at [phone]

## 2021-05-17 NOTE — TELEPHONE ENCOUNTER
Called and spoke with patient, relayed message below. Patient expressed understanding. She states DOT needs to clear here as well and they will not allow her to go back to work for 3 months after implant.

## 2021-05-17 NOTE — TELEPHONE ENCOUNTER
Dr. Scott Mins     Please advise regarding DOT clearance. Patient underwent PPM on 4/27/21 after presenting to ED with CHB. Hx of HTN and had a normal LHC in 2018.

## 2021-05-21 ENCOUNTER — TELEPHONE (OUTPATIENT)
Dept: DERMATOLOGY | Age: 66
End: 2021-05-21

## 2021-05-21 NOTE — TELEPHONE ENCOUNTER
Dr Hiral Lloyd patient  Pt lvm c/b #[phone]  Pt stated  Just had an implant pacemaker installed, was able to remove bandage off and wanted to know what was a good ointment to use on the scar.   Please c/b to discuss

## 2021-05-21 NOTE — TELEPHONE ENCOUNTER
Go with the cardiologist's recommendation if the wound is fresh (last couple of weeks). A silicone based scar gel like Scarfade is a good one to use once the surgical wound has healed.

## 2021-05-26 ENCOUNTER — TELEPHONE (OUTPATIENT)
Dept: PRIMARY CARE CLINIC | Age: 66
End: 2021-05-26

## 2021-05-26 NOTE — TELEPHONE ENCOUNTER
Klaudia Tinoco called and wanted us to know that she can not go back to work for 3 months from when the pacemaker was put in,. And that was done on 04-. Per FRANNY GALLEGO

## 2021-06-02 ENCOUNTER — OFFICE VISIT (OUTPATIENT)
Dept: PRIMARY CARE CLINIC | Age: 66
End: 2021-06-02
Payer: COMMERCIAL

## 2021-06-02 VITALS
HEIGHT: 65 IN | HEART RATE: 80 BPM | DIASTOLIC BLOOD PRESSURE: 80 MMHG | BODY MASS INDEX: 42.65 KG/M2 | WEIGHT: 256 LBS | SYSTOLIC BLOOD PRESSURE: 118 MMHG | TEMPERATURE: 97.2 F

## 2021-06-02 DIAGNOSIS — I10 ESSENTIAL HYPERTENSION: Primary | ICD-10-CM

## 2021-06-02 DIAGNOSIS — E11.9 TYPE 2 DIABETES MELLITUS WITHOUT COMPLICATION, WITHOUT LONG-TERM CURRENT USE OF INSULIN (HCC): ICD-10-CM

## 2021-06-02 DIAGNOSIS — K21.9 GASTROESOPHAGEAL REFLUX DISEASE WITHOUT ESOPHAGITIS: ICD-10-CM

## 2021-06-02 PROCEDURE — 99214 OFFICE O/P EST MOD 30 MIN: CPT | Performed by: FAMILY MEDICINE

## 2021-06-02 PROCEDURE — 3051F HG A1C>EQUAL 7.0%<8.0%: CPT | Performed by: FAMILY MEDICINE

## 2021-06-02 RX ORDER — CLONIDINE HYDROCHLORIDE 0.2 MG/1
TABLET ORAL
Qty: 180 TABLET | Refills: 1 | Status: SHIPPED | OUTPATIENT
Start: 2021-06-02 | End: 2021-10-18 | Stop reason: SDUPTHER

## 2021-06-02 RX ORDER — CANAGLIFLOZIN AND METFORMIN HYDROCHLORIDE 150; 500 MG/1; MG/1
TABLET, FILM COATED ORAL
Qty: 180 TABLET | Refills: 2 | Status: SHIPPED | OUTPATIENT
Start: 2021-06-02 | End: 2021-09-20

## 2021-06-02 RX ORDER — AMLODIPINE BESYLATE 5 MG/1
TABLET ORAL
Qty: 90 TABLET | Refills: 2 | Status: SHIPPED | OUTPATIENT
Start: 2021-06-02 | End: 2021-11-11

## 2021-06-02 RX ORDER — ATORVASTATIN CALCIUM 20 MG/1
TABLET, FILM COATED ORAL
Qty: 90 TABLET | Refills: 3 | Status: SHIPPED | OUTPATIENT
Start: 2021-06-02 | End: 2022-07-06

## 2021-06-02 SDOH — ECONOMIC STABILITY: FOOD INSECURITY: WITHIN THE PAST 12 MONTHS, THE FOOD YOU BOUGHT JUST DIDN'T LAST AND YOU DIDN'T HAVE MONEY TO GET MORE.: NEVER TRUE

## 2021-06-02 SDOH — ECONOMIC STABILITY: FOOD INSECURITY: WITHIN THE PAST 12 MONTHS, YOU WORRIED THAT YOUR FOOD WOULD RUN OUT BEFORE YOU GOT MONEY TO BUY MORE.: NEVER TRUE

## 2021-06-02 ASSESSMENT — SOCIAL DETERMINANTS OF HEALTH (SDOH): HOW HARD IS IT FOR YOU TO PAY FOR THE VERY BASICS LIKE FOOD, HOUSING, MEDICAL CARE, AND HEATING?: NOT HARD AT ALL

## 2021-06-02 NOTE — PROGRESS NOTES
CC one month follow up    She has htn, dm-2, and reflux    Hypertension  No chest pain, headache, sob, leg edema, stroke, kidney disease     Diabetes type 2  AM 70's  No hypoglycemia  No polyuria, polydipsia, weight changes or blurred vision    GERD  No belching, nausea, vomiting, epigastric pain  At this time      She is post pacemaker placement for complete heart block    No dizzy spells or sob  Has fu with card    Physical Exam  Constitutional:       General: She is not in acute distress. Appearance: She is well-developed. She is not diaphoretic. HENT:      Head: Normocephalic and atraumatic. Right Ear: External ear normal.      Left Ear: External ear normal.      Nose: Nose normal.      Mouth/Throat:      Pharynx: No oropharyngeal exudate. Eyes:      General: No scleral icterus. Left eye: No discharge. Conjunctiva/sclera: Conjunctivae normal.      Pupils: Pupils are equal, round, and reactive to light. Neck:      Thyroid: No thyromegaly. Vascular: No JVD. Trachea: No tracheal deviation. Cardiovascular:      Rate and Rhythm: Normal rate and regular rhythm. Heart sounds: Normal heart sounds. No murmur heard. No friction rub. No gallop. Pulmonary:      Effort: Pulmonary effort is normal. No respiratory distress. Breath sounds: Normal breath sounds. No stridor. No wheezing or rales. Chest:      Chest wall: No tenderness. Abdominal:      General: Bowel sounds are normal. There is no distension. Palpations: Abdomen is soft. There is no mass. Tenderness: There is no abdominal tenderness. There is no guarding or rebound. Musculoskeletal:         General: No tenderness. Normal range of motion. Cervical back: Normal range of motion and neck supple. Lymphadenopathy:      Cervical: No cervical adenopathy. Skin:     General: Skin is warm and dry. Coloration: Skin is not pale. Findings: No erythema or rash.    Neurological:      Mental Status: She is alert and oriented to person, place, and time. Cranial Nerves: No cranial nerve deficit. Coordination: Coordination normal.      Deep Tendon Reflexes: Reflexes are normal and symmetric. Reflexes normal.   Psychiatric:         Behavior: Behavior normal.         Thought Content: Thought content normal.         Judgment: Judgment normal.         Visual inspection:  Deformity/amputation: absent  Skin lesions/pre-ulcerative calluses: absent  Edema: right- negative, left- negative    Sensory exam:  Monofilament sensation: normal  (minimum of 5 random plantar locations tested, avoiding callused areas - > 1 area with absence of sensation is + for neuropathy)    Plus at least one of the following:  Pulses: normal,   Pinprick: Intact  Proprioception: Intact  Vibration (128 Hz): Intact    1. Essential hypertension  bp at goal  Recent renal  meds refill  - cloNIDine (CATAPRES) 0.2 MG tablet; TAKE 1 TABLET BY MOUTH TWICE DAILY  Dispense: 180 tablet; Refill: 1  - atorvastatin (LIPITOR) 20 MG tablet; TAKE ONE TABLET BY MOUTH DAILY  Dispense: 90 tablet; Refill: 3  - amLODIPine (NORVASC) 5 MG tablet; TAKE 1 TABLET BY MOUTH DAILY  Dispense: 90 tablet; Refill: 2    2. Gastroesophageal reflux disease without esophagitis  No current sxs  Continue pantoprazole 40 mg one a day    3. Type 2 diabetes mellitus without complication, without long-term current use of insulin (HCC)  Last AIC 7.3  Goal < 7  Diet, exercise  Cont. Current treatment  - canagliflozin-metformin HCl (INVOKAMET) 150-500 MG; TAKE 1 TABLET BY MOUTH TWICE DAILY WITH MEALS  Dispense: 180 tablet;  Refill: 2    Get eye exam  Get colonoscopy done

## 2021-06-03 ENCOUNTER — TELEPHONE (OUTPATIENT)
Dept: CARDIOLOGY CLINIC | Age: 66
End: 2021-06-03

## 2021-06-03 NOTE — TELEPHONE ENCOUNTER
Marimarbrock Camryn stopped in to drop off her SHORT TERM DISABILITY FORMS for Dr. Julianna Constantino to fill out. I placed them in the MA's folder. 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 Balta Cowan needs the paperwork to be fax to 2 places.

## 2021-06-03 NOTE — TELEPHONE ENCOUNTER
Rajani Cui RN reached out to patient and is awaiting documentation from DOT before completing forms.

## 2021-06-04 NOTE — TELEPHONE ENCOUNTER
Patient dropped of paperwork from [address] [employer] requirement a patient to be 3 months out from date of pacemaker implantation before they can be DOT certified.     Paperwork filled out will have Dr. Karine Zabala sign paperwork when he is in office on Monday 6-7

## 2021-06-09 NOTE — TELEPHONE ENCOUNTER
Julieta Mendez called in wanting to speak with Kimberly Hawkins, she said the papers Kimberly Hawkins was supposed to fax haven't been received. Julieta Mendez wanted to know if Kimberly Hawkins faxed over her disability paperwork to the wrong number.      You can reach Julieta Mendez at [phone]

## 2021-06-17 ENCOUNTER — NURSE ONLY (OUTPATIENT)
Dept: CARDIOLOGY CLINIC | Age: 66
End: 2021-06-17
Payer: COMMERCIAL

## 2021-06-17 DIAGNOSIS — Z95.0 PACEMAKER: ICD-10-CM

## 2021-06-17 DIAGNOSIS — I44.2 COMPLETE HEART BLOCK (HCC): ICD-10-CM

## 2021-06-17 PROCEDURE — 93296 REM INTERROG EVL PM/IDS: CPT | Performed by: INTERNAL MEDICINE

## 2021-06-17 PROCEDURE — 93294 REM INTERROG EVL PM/LDLS PM: CPT | Performed by: INTERNAL MEDICINE

## 2021-06-17 NOTE — PROGRESS NOTES
We received remote transmission from patient's monitor at home. Transmission shows normal sensing and pacing function. Ap 6.2%   100% (MVP Off)    1 AT/AF event recorded on 06.16.2021 @ 15:43, 3min; markers appear to show AT w . EP physician will review. See interrogation under cardiology tab in the [address] [place] [address] [address] field for more details.

## 2021-06-17 NOTE — LETTER
utilizing the no news is good news approach. As always, please feel free to contact your nurse with any questions or concerns. Thank you.     Eliane

## 2021-07-23 DIAGNOSIS — E11.9 TYPE 2 DIABETES MELLITUS WITHOUT COMPLICATION, WITHOUT LONG-TERM CURRENT USE OF INSULIN (HCC): ICD-10-CM

## 2021-07-23 RX ORDER — GLIMEPIRIDE 4 MG/1
TABLET ORAL
Qty: 90 TABLET | Refills: 1 | Status: SHIPPED | OUTPATIENT
Start: 2021-07-23 | End: 2022-03-31

## 2021-07-28 DIAGNOSIS — N17.9 AKI (ACUTE KIDNEY INJURY) (HCC): ICD-10-CM

## 2021-07-28 LAB
ANION GAP SERPL CALCULATED.3IONS-SCNC: 10 MMOL/L (ref 3–16)
BUN BLDV-MCNC: 27 MG/DL (ref 7–20)
CALCIUM SERPL-MCNC: 10 MG/DL (ref 8.3–10.6)
CHLORIDE BLD-SCNC: 101 MMOL/L (ref 99–110)
CO2: 29 MMOL/L (ref 21–32)
CREAT SERPL-MCNC: 1 MG/DL (ref 0.6–1.2)
GFR AFRICAN AMERICAN: >60
GFR NON-AFRICAN AMERICAN: 55
GLUCOSE BLD-MCNC: 92 MG/DL (ref 70–99)
POTASSIUM SERPL-SCNC: 4.7 MMOL/L (ref 3.5–5.1)
SODIUM BLD-SCNC: 140 MMOL/L (ref 136–145)

## 2021-08-02 ENCOUNTER — TELEPHONE (OUTPATIENT)
Dept: PULMONOLOGY | Age: 66
End: 2021-08-02

## 2021-08-02 NOTE — TELEPHONE ENCOUNTER
Was not seen since 03/2019, she can use the machine I she is still activity a  and wants to keep the DOT.

## 2021-08-02 NOTE — TELEPHONE ENCOUNTER
Spoke to Brissa Villarreal and she reminded me that she had a VV due to Covid last year.   She would like Dr Ayana Jerez to look at her initial sleep study results to evaluate how severe her results were and his recommendation based on those results

## 2021-08-02 NOTE — TELEPHONE ENCOUNTER
I did look before I answered  Had study done on 01/27/2014 which showed an AHI - 27/hr with Low SaO2 - 51% and time below 90% of 16.1% of the time. This is consistent with moderate VEL (327.23)    Anyway, sounds like she had her machine since 2014, I ordered new CPAP machine. 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 Will not do FMLA, if she quit working because of not using the CPAP.

## 2021-08-02 NOTE — TELEPHONE ENCOUNTER
Patient has a machine that has been recalled, she was wondering what is your advice on what she should do now? She did register her machine.

## 2021-08-02 NOTE — PROGRESS NOTES
Sav Rm         : 1955  [x] [facility]     [] [address]      [] Cecilia     []Gem    [] Daniel Cordova  [] [facility]   [] Other:  Diagnosis: [x] VEL (G47.33) [] CSA (G47.31) [] Apnea (G47.30)   Length of Need: [] 12 Months [x] 99 Months [] Other:    Machine (JASON!): [] Respironics Dream Station      Auto [x] ResMed AirSense     Auto [] Other:     [x]  CPAP () [] Bilevel ()   Mode: [x] Auto [] Spontaneous    Mode: [] Auto [] Spontaneous      New ResMed machine between 8 and 12 cm                  Comfort Settings:   - Ramp Pressure: 5 cmH2O                                        - Ramp time: 15 min                                     -  Flex/EPR - 3 full time                                    - For ResMed Bilevel (TiMax-4 sec   TiMin- 0.2 sec)     Humidifier: [x] Heated ()        [x] Water chamber replacement ()/ 1 per 6 months        Mask:  Please always start with the mask the patient used during the titraion   [x] Nasal () /1 per 3 months [x] Full Face () /1 per 3 months   [x] Patient choice -Size and fit mask [x] Patient Choice - Size and fit mask   [] Dispense:  [] Dispense:    [x] Headgear () / 1 per 3 months [x] Headgear () / 1 per 3 months   [x] Replacement Nasal Cushion ()/2 per month [x] Interface Replacement ()/1 per month   [x] Replacement Nasal Pillows ()/2 per month         Tubing: [x] Heated ()/1 per 3 months    [] Standard ()/1 per 3 months [] Other:           Filters: [x] Non-disposable ()/1 per 6 months     [x] Ultra-Fine, Disposable ()/2 per month        Miscellaneous: [x] Chin Strap ()/ 1 per 6 months [] O2 bleed-in:       LPM   [] Oximetry on CPAP/Bilevel []  Other:          Start Order Date: 21    MEDICAL JUSTIFICATION:  I, the undersigned, certify that the above prescribed supplies are medically necessary for this patients wellbeing.   In my opinion, the supplies are both reasonable and necessary in reference to accepted standards of medicalpractice in treatment of this patients condition.     Heather Leal MD      NPI: [id number]       Order Signed Date: 08/02/21    Electronically signed by Heather Leal MD on 8/2/2021 at 1:33 PM

## 2021-08-02 NOTE — TELEPHONE ENCOUNTER
Spoke. 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 So she wants to know if she should continue to use the respironics machine until she gets a new machine? ?    Will hold off faxing her cpap RX.  She said she has new insurance and she  will give the new insurance information when we call her back regarding cpap usage

## 2021-08-05 NOTE — TELEPHONE ENCOUNTER
Spoke to Howie Paniagua to tell her to continue using her cpap per Dr Evens Rodriges prior comment and she said someone from [address] [place] [address] had already called her and said she would be getting a new machine paid for by her old insurance [employer] in 4-5 days  Apparently the order was faxed 8/2/21 for new machine and they had already reached out to her  Told her to call when she gets new machine to schedule appt

## 2021-08-05 NOTE — TELEPHONE ENCOUNTER
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

## 2021-08-09 ENCOUNTER — NURSE ONLY (OUTPATIENT)
Dept: CARDIOLOGY CLINIC | Age: 66
End: 2021-08-09
Payer: MEDICARE

## 2021-08-09 ENCOUNTER — OFFICE VISIT (OUTPATIENT)
Dept: CARDIOLOGY CLINIC | Age: 66
End: 2021-08-09
Payer: MEDICARE

## 2021-08-09 VITALS
DIASTOLIC BLOOD PRESSURE: 70 MMHG | WEIGHT: 263 LBS | OXYGEN SATURATION: 97 % | SYSTOLIC BLOOD PRESSURE: 116 MMHG | BODY MASS INDEX: 43.82 KG/M2 | HEIGHT: 65 IN | HEART RATE: 73 BPM

## 2021-08-09 DIAGNOSIS — I44.2 COMPLETE HEART BLOCK (HCC): Primary | ICD-10-CM

## 2021-08-09 DIAGNOSIS — I44.2 COMPLETE HEART BLOCK (HCC): ICD-10-CM

## 2021-08-09 DIAGNOSIS — Z95.0 PACEMAKER: ICD-10-CM

## 2021-08-09 PROCEDURE — G8400 PT W/DXA NO RESULTS DOC: HCPCS | Performed by: INTERNAL MEDICINE

## 2021-08-09 PROCEDURE — 1123F ACP DISCUSS/DSCN MKR DOCD: CPT | Performed by: INTERNAL MEDICINE

## 2021-08-09 PROCEDURE — 1090F PRES/ABSN URINE INCON ASSESS: CPT | Performed by: INTERNAL MEDICINE

## 2021-08-09 PROCEDURE — 4040F PNEUMOC VAC/ADMIN/RCVD: CPT | Performed by: INTERNAL MEDICINE

## 2021-08-09 PROCEDURE — 99214 OFFICE O/P EST MOD 30 MIN: CPT | Performed by: INTERNAL MEDICINE

## 2021-08-09 PROCEDURE — 1036F TOBACCO NON-USER: CPT | Performed by: INTERNAL MEDICINE

## 2021-08-09 PROCEDURE — G8417 CALC BMI ABV UP PARAM F/U: HCPCS | Performed by: INTERNAL MEDICINE

## 2021-08-09 PROCEDURE — 3017F COLORECTAL CA SCREEN DOC REV: CPT | Performed by: INTERNAL MEDICINE

## 2021-08-09 PROCEDURE — G8427 DOCREV CUR MEDS BY ELIG CLIN: HCPCS | Performed by: INTERNAL MEDICINE

## 2021-08-09 PROCEDURE — 93280 PM DEVICE PROGR EVAL DUAL: CPT | Performed by: INTERNAL MEDICINE

## 2021-08-09 NOTE — PROGRESS NOTES
Pt seen in clinic today for cardiac device interrogation 3 mo post implant. Their device is a MDT 2 chamber ppm     Based on threshold, impedance, and intrinsic sensing tests run today, the device appears to be functioning with minimal deviation from established trends. Remaining battery life is 15.5y after PA's turned down to [email or web address] 4ms today. AP 9.01%                  99.99%      1 previously viewed episode AT 3:01 duration 6/16/2021 - could be AT or frequent PAC's    Pt was informed of findings today and general questions have been answered with regard to device. Home monitoring hardware in use. Results discussed with or to be reviewed by Dr. Murray Perdue.

## 2021-08-09 NOTE — PROGRESS NOTES
Cardiac Electrophysiology Consultation   Date: 8/9/2021  Reason for Consultation: Device management. Consult Requesting Physician:  Deanne Poole. JOSE German  Primary Care Physician: Cat Ricardo MD    Chief Complaint:   Chief Complaint   Patient presents with    Follow-up     s/p PPM - device check - no cardiac complaints        HPI: Willie Sanchez is a 76 y.o. patient with a history of non-reversible bradycardia secondary to sinus rhythm with complete heart block and junctional escape rhythm with v-rates 30-40 bpm with symptoms of chest pain, dizziness. She underwent implantation of Medtronic dual chamber pacemaker on 4/27/2021. Interval History: Today, she presents to office for three month post pacemaker implantation on 4/27/2021. She states she is feeling well and has denied having any further symptoms s/p implant. She is compliant with her medications and tolerating them well. She denies chest pain/pressure, tightness, edema, shortness of breath, heart racing, palpitations, lightheadedness, dizziness, syncope, presyncope,  PND or orthopnea. She states she was started on Clonidine by her PCP for BP control and stated her PCP wanted her to check with Dr. Sarah Pride to make sure it is ok. Advised ok to continue Clonidine. Past Medical History:   Diagnosis Date    Diabetes mellitus ([address]     GERD (gastroesophageal reflux disease)     Hyperlipidemia     Hypertension     Obesity     S/P hip replacement     Sleep apnea         Past Surgical History:   Procedure Laterality Date    JOINT REPLACEMENT      TOTAL HIP ARTHROPLASTY Left Oct '13       Allergies: Allergies   Allergen Reactions    Codeine Itching    Penicillins Itching       Medication:   Prior to Admission medications    Medication Sig Start Date End Date Taking?  Authorizing Provider   glimepiride (AMARYL) 4 MG tablet TAKE 1 TABLET BY MOUTH EVERY MORNING 7/23/21  Yes Cat Ricardo MD   cloNIDine (CATAPRES) 0.2 MG tablet TAKE 1 TABLET BY MOUTH TWICE DAILY 6/2/21  Yes José Miguel Storm MD   canagliflozin-metformin HCl (INVOKAMET) 150-500 MG TAKE 1 TABLET BY MOUTH TWICE DAILY WITH MEALS 6/2/21  Yes José Miguel Storm MD   atorvastatin (LIPITOR) 20 MG tablet TAKE ONE TABLET BY MOUTH DAILY 6/2/21  Yes José Miguel Storm MD   amLODIPine (NORVASC) 5 MG tablet TAKE 1 TABLET BY MOUTH DAILY 6/2/21  Yes José Miguel Storm MD   oxaprozin (DAYPRO) 600 MG tablet TAKE 1 TABLET BY MOUTH EVERY DAY 5/21/21  Yes José Miguel Storm MD   albuterol sulfate HFA (PROVENTIL HFA) 108 (90 Base) MCG/ACT inhaler Inhale 2 puffs into the lungs every 6 hours as needed for Wheezing 4/19/21  Yes José Miguel Storm MD   valsartan-hydroCHLOROthiazide (DIOVAN-HCT) 320-25 MG per tablet Take 1 tablet by mouth daily 3/17/21  Yes José Miguel Storm MD   pantoprazole (PROTONIX) 40 MG tablet TAKE 1 TABLET BY MOUTH DAILY 2/2/21  Yes José Miguel Storm MD   minocycline (MINOCIN;DYNACIN) 50 MG capsule Take 1 capsule by mouth 2 times daily 1/27/21  Yes Roz Jacobson MD   blood glucose monitor strips Dispense glucose strips covered by insurance and compatible with her glucose monitor  AIC 8.2  Test glucose twice a day 1/22/21  Yes José Miguel Storm MD   Dulaglutide (TRULICITY) 6.87 CY/5.0WQ SOPN ADMINISTER 0.5 ML UNDER THE SKIN 1 TIME WEEKLY 1/22/21  Yes José Miguel Storm MD   estradiol (ESTRING) 2 MG vaginal ring Place 2 mg vaginally 2/25/19  Yes Nba Jesus MD   aspirin 81 MG EC tablet Take 1 tablet by mouth daily 10/18/19  Yes Laurelyn Najjar, MD   tacrolimus (PROTOPIC) 0.1 % ointment Apply topically to the face 2 times daily if needed for rosacea. 7/23/19  Yes Roz Jacobson MD   clobetasol (TEMOVATE) 0.05 % ointment Apply to affected area on the left thigh twice daily for up to 2 weeks or until improved. 5/3/18  Yes Roz Jacobson MD   mometasone (NASONEX) 50 MCG/ACT nasal spray SHAKE WELL AND USE 2 SPRAYS NASALLY DAILY 10/4/17  Yes José Miguel Storm MD       Social History:   reports that she has never smoked.  She has never used smokeless tobacco. She reports that she does not drink alcohol and does not use drugs. Family History:  family history includes Heart Disease in her mother. Reviewed. Denies family history of sudden cardiac death, arrhythmia, premature CAD    Review of System:    · General ROS: negative for - chills, fever   · Psychological ROS: negative for - anxiety or depression  · Ophthalmic ROS: negative for - eye pain or loss of vision  · ENT ROS: negative for - epistaxis, headaches, nasal discharge, sore throat   · Allergy and Immunology ROS: negative for - hives, nasal congestion   · Hematological and Lymphatic ROS: negative for - bleeding problems, blood clots, bruising or jaundice  · Endocrine ROS: negative for - skin changes, temperature intolerance or unexpected weight changes  · Respiratory ROS: negative for - cough, hemoptysis, pleuritic pain, SOB, sputum changes or wheezing  · Cardiovascular ROS: Per HPI. · Gastrointestinal ROS: negative for - abdominal pain, blood in stools, diarrhea, hematemesis, melena, nausea/vomiting or swallowing difficulty/pain  · Genito-Urinary ROS: negative for - dysuria or incontinence  · Musculoskeletal ROS: negative for - joint swelling or muscle pain  · Neurological ROS: negative for - confusion, dizziness, gait disturbance, headaches, numbness/tingling, seizures, speech problems, tremors, visual changes or weakness  · Dermatological ROS: negative for - rash    Physical Examination:  Vitals:    08/09/21 1057   BP: 116/70   Pulse: 73   SpO2: 97%       · Constitutional: Oriented. No distress. · Head: Normocephalic and atraumatic. · Mouth/Throat: Oropharynx is clear and moist.   · Eyes: Conjunctivae normal. EOM are normal.   · Neck: Normal range of motion. Neck supple. No rigidity. No JVD present. · Cardiovascular: Normal rate, regular rhythm, S1&S2 and intact distal pulses. · Pulmonary/Chest: Bilateral respiratory sounds. No wheezes. No rhonchi. · Abdominal: Soft. Bowel sounds present. No distension, No tenderness. · Musculoskeletal: No tenderness. No edema    · Lymphadenopathy: Has no cervical adenopathy. · Neurological: Alert and oriented. Cranial nerve appears intact, No Gross deficit   · Skin: Skin is warm and dry. No rash noted. · Psychiatric: Has a normal mood, affect and behavior     Labs:  Reviewed. ECG: reviewed, Sinus  rhythm with paced ventricular complexes that track sinus rhythm with v-rate of 70 bpm with QRS duration 160 ms. Studies:   1. Event monitor:       2. Echo: 4/26/2021   Suboptimal image quality.   Left ventricular cavity size is normal.   Ejection fraction is visually estimated to be 60%.   No regional wall motion abnormalities are noted.   Normal diastolic function.   The left atrium is dilated.   The right ventricle is dilated.   Right ventricular systolic function is normal.    3. Stress Test:  2/5/2018  Abnormal study. There is a large sized, moderate intensity, reversible    defect of the basal to mid anterolateral, mid inferolateral, and apical    lateral walls which is consistent with ischemia.    There is breast attenuation but stress images appear worse.    Normal LV size and systolic function.    Uncontrolled hypertension.    Overall findings represent a high risk study     4. Cath: 4/26/2021  ANGIOGRAPHY FINDINGS:  1.  Normal left main coronary artery. 2.  Normal left anterior descending artery. 3.  Normal left circumflex artery. 4.  Normal right coronary artery. 5.  LV ejection fraction 60%. I independently reviewed the ECG, MCOT, echocardiogram, stress test, and coronary angiography/PCI results and used them for my plan of care. Procedures:  1. Implantation of Medtronic dual chamber pacemaker 4/27/2021. Assessment/Plan:     Complete Heart Block  Noted on EKG 4/23/2021. Was symptomatic with chest pain fatigue, BERGMAN and lightheadedness.   S/p implantation of Medtronic dual chamber pacemaker on 4/27/2021  Device interrogated today and based on threshold, impedance, and intrinsic sensing tests run today, the device appears to be functioning with minimal deviation from established trends. Remaining battery life is 15.5y after PA's turned down to [email or web address]. 4ms today. AP 9.01%    99.99%    Device interrogation showed one episode that occurred on 6/6/2021 of multiple PAC in the midst of SR lasting three minutes in duration. Patient denied having any symptoms. Will continue to monitor. No atrial fibrillation/atrial flutter/atrial tachycardia at this time. Follow up in one year with [facility], with in office device interrogation. Continue remote monitoring of device. Thank you for allowing me to participate in the care of Cristina Crespo. All questions and concerns were addressed to the patient/family. Alternatives to my treatment were discussed. This note was scribed in the presence of Dr. Susie Lowe MD by Asia Leonard RN. The scribe's documentation has been prepared under my direction and personally reviewed by me in its entirety. I confirm that the note above accurately reflects all work, physical examination, the discussion of treatments and procedures, and medical decision making performed by me.     Susie Lowe MD, MS, [facility] [place], [facility]  Cardiac Electrophysiology  [address]  [address] [facility], [facility]  [facility] [address]  [phone]

## 2021-08-17 RX ORDER — MINOCYCLINE HYDROCHLORIDE 50 MG/1
50 CAPSULE ORAL 2 TIMES DAILY
Qty: 60 CAPSULE | Refills: 1 | Status: CANCELLED | OUTPATIENT
Start: 2021-08-17

## 2021-08-17 RX ORDER — MINOCYCLINE HYDROCHLORIDE 50 MG/1
50 CAPSULE ORAL 2 TIMES DAILY
Qty: 60 CAPSULE | Refills: 1 | Status: SHIPPED | OUTPATIENT
Start: 2021-08-17 | End: 2021-10-18

## 2021-08-17 NOTE — TELEPHONE ENCOUNTER
LOV: 6/2020  Next OV: 2/2022    Patient states she takes the Minocycline to help with her rosacea flare ups. Please advise.

## 2021-08-17 NOTE — TELEPHONE ENCOUNTER
Dr Mike Jules patient  Pt c/b #[phone]  Pt stated  Requesting medication refill for Minocycline 50 MG capsule. Preferred pharmacy  [facility] #[id number]- [facility], [address] [place] [address].  JUAN PERALTA. Oneal Gray [phone] - F [phone]

## 2021-08-19 ENCOUNTER — HOSPITAL ENCOUNTER (OUTPATIENT)
Dept: WOMENS IMAGING | Age: 66
Discharge: HOME | End: 2021-08-19
Payer: MEDICARE

## 2021-08-19 DIAGNOSIS — Z12.31 ENCOUNTER FOR MAMMOGRAM TO ESTABLISH BASELINE MAMMOGRAM: ICD-10-CM

## 2021-08-19 PROCEDURE — 77067 SCR MAMMO BI INCL CAD: CPT

## 2021-08-31 ENCOUNTER — TELEPHONE (OUTPATIENT)
Dept: PRIMARY CARE CLINIC | Age: 66
End: 2021-08-31

## 2021-08-31 DIAGNOSIS — E11.9 TYPE 2 DIABETES MELLITUS WITHOUT COMPLICATION, WITHOUT LONG-TERM CURRENT USE OF INSULIN (HCC): Primary | ICD-10-CM

## 2021-08-31 DIAGNOSIS — E11.9 TYPE 2 DIABETES MELLITUS WITHOUT COMPLICATION, WITHOUT LONG-TERM CURRENT USE OF INSULIN (HCC): ICD-10-CM

## 2021-08-31 RX ORDER — CANAGLIFLOZIN 300 MG/1
300 TABLET, FILM COATED ORAL
Qty: 90 TABLET | Refills: 1 | Status: SHIPPED | OUTPATIENT
Start: 2021-08-31 | End: 2021-08-31 | Stop reason: SDUPTHER

## 2021-08-31 RX ORDER — CANAGLIFLOZIN 300 MG/1
300 TABLET, FILM COATED ORAL
Qty: 90 TABLET | Refills: 1 | Status: SHIPPED | OUTPATIENT
Start: 2021-08-31 | End: 2021-09-20

## 2021-08-31 NOTE — TELEPHONE ENCOUNTER
Maddi Rika has been printed because I do not know which pharmacy that she will require. The Invokana is 300 mg and should only be taken once a day. Metformin has been printed as well and this is a twice a day prescription. The separate medications are equivalent to invok a met.

## 2021-08-31 NOTE — TELEPHONE ENCOUNTER
Patient says she has changed insurances, which cause the cost of this medication unaffordable unCanagliflozin-metformin HCl (INVOKAMET) 150-500 MG. She is asking for a new prescriptions for both medication metformin, invokamet separately. She also experiencing the same with this medication unaffordable  Dulaglutide (TRULICITY) 9.94 HL/1.9PG SOPN. Patient says she is completely out of the unCanagliflozin-metformin HCl (INVOKAMET) 150-500 MG, her last dose was on 08/28/21.   Please Advise

## 2021-09-16 ENCOUNTER — NURSE ONLY (OUTPATIENT)
Dept: CARDIOLOGY CLINIC | Age: 66
End: 2021-09-16
Payer: MEDICARE

## 2021-09-16 DIAGNOSIS — I44.2 COMPLETE HEART BLOCK (HCC): ICD-10-CM

## 2021-09-16 DIAGNOSIS — Z95.0 PACEMAKER: ICD-10-CM

## 2021-09-17 PROCEDURE — 93294 REM INTERROG EVL PM/LDLS PM: CPT | Performed by: INTERNAL MEDICINE

## 2021-09-17 PROCEDURE — 93296 REM INTERROG EVL PM/IDS: CPT | Performed by: INTERNAL MEDICINE

## 2021-09-17 NOTE — PROGRESS NOTES
We received remote transmission from patient's dual chamber pacemaker monitor at home. Transmission shows normal sensing and pacing function. No new arrhythmias/events recorded. Ap 17.3%   100% (MVP Off)  Echo 97.3144 showed EF of 60%. EP physician will review. See interrogation under cardiology tab in the [address] [place] [address] [address] field for more details.

## 2021-09-20 ENCOUNTER — TELEPHONE (OUTPATIENT)
Dept: PRIMARY CARE CLINIC | Age: 66
End: 2021-09-20

## 2021-09-20 ENCOUNTER — OFFICE VISIT (OUTPATIENT)
Dept: PRIMARY CARE CLINIC | Age: 66
End: 2021-09-20
Payer: MEDICARE

## 2021-09-20 VITALS
TEMPERATURE: 97.2 F | OXYGEN SATURATION: 97 % | HEIGHT: 65 IN | BODY MASS INDEX: 45.15 KG/M2 | WEIGHT: 271 LBS | DIASTOLIC BLOOD PRESSURE: 78 MMHG | HEART RATE: 78 BPM | SYSTOLIC BLOOD PRESSURE: 111 MMHG

## 2021-09-20 DIAGNOSIS — R60.0 BILATERAL LEG EDEMA: Primary | ICD-10-CM

## 2021-09-20 DIAGNOSIS — I20.9 ANGINA PECTORIS (HCC): ICD-10-CM

## 2021-09-20 DIAGNOSIS — Z78.0 POSTMENOPAUSAL: ICD-10-CM

## 2021-09-20 DIAGNOSIS — E11.9 TYPE 2 DIABETES MELLITUS WITHOUT COMPLICATION, WITHOUT LONG-TERM CURRENT USE OF INSULIN (HCC): ICD-10-CM

## 2021-09-20 LAB
HBA1C MFR BLD: 6.4 %
HBA1C MFR BLD: 6.4 %

## 2021-09-20 PROCEDURE — 1123F ACP DISCUSS/DSCN MKR DOCD: CPT | Performed by: FAMILY MEDICINE

## 2021-09-20 PROCEDURE — 83036 HEMOGLOBIN GLYCOSYLATED A1C: CPT | Performed by: FAMILY MEDICINE

## 2021-09-20 PROCEDURE — 99214 OFFICE O/P EST MOD 30 MIN: CPT | Performed by: FAMILY MEDICINE

## 2021-09-20 PROCEDURE — G8417 CALC BMI ABV UP PARAM F/U: HCPCS | Performed by: FAMILY MEDICINE

## 2021-09-20 PROCEDURE — 1036F TOBACCO NON-USER: CPT | Performed by: FAMILY MEDICINE

## 2021-09-20 PROCEDURE — 4040F PNEUMOC VAC/ADMIN/RCVD: CPT | Performed by: FAMILY MEDICINE

## 2021-09-20 PROCEDURE — G8400 PT W/DXA NO RESULTS DOC: HCPCS | Performed by: FAMILY MEDICINE

## 2021-09-20 PROCEDURE — G8427 DOCREV CUR MEDS BY ELIG CLIN: HCPCS | Performed by: FAMILY MEDICINE

## 2021-09-20 PROCEDURE — 3017F COLORECTAL CA SCREEN DOC REV: CPT | Performed by: FAMILY MEDICINE

## 2021-09-20 PROCEDURE — 3051F HG A1C>EQUAL 7.0%<8.0%: CPT | Performed by: FAMILY MEDICINE

## 2021-09-20 PROCEDURE — 2022F DILAT RTA XM EVC RTNOPTHY: CPT | Performed by: FAMILY MEDICINE

## 2021-09-20 PROCEDURE — 1090F PRES/ABSN URINE INCON ASSESS: CPT | Performed by: FAMILY MEDICINE

## 2021-09-20 RX ORDER — METFORMIN HYDROCHLORIDE 500 MG/1
TABLET, EXTENDED RELEASE ORAL
Qty: 180 TABLET | Refills: 1 | Status: SHIPPED | OUTPATIENT
Start: 2021-09-20 | End: 2021-12-17

## 2021-09-20 NOTE — TELEPHONE ENCOUNTER
Patient has swelling in both feet, and legs when she starting walking on them. She has been experiencing this for over a week. She asking for Dr. Jacinda Reagan to call her this morning.    Please Advise

## 2021-09-20 NOTE — PATIENT INSTRUCTIONS
Stop actos   Surgical stockings  Get lab done  Arthritis pills and amlodipine can cause leg edema  Will monitor  Traveling can cause leg edema    Ck for fluid of lungs  Ck for leg clots

## 2021-09-20 NOTE — PROGRESS NOTES
rash.   Neurological:      Mental Status: She is alert and oriented to person, place, and time. Cranial Nerves: No cranial nerve deficit. Coordination: Coordination normal.      Deep Tendon Reflexes: Reflexes are normal and symmetric. Reflexes normal.   Psychiatric:         Behavior: Behavior normal.         Thought Content: Thought content normal.         Judgment: Judgment normal.           1. Bilateral leg edema  Maybe related to recent extensive travels by auto recently  Other causes include nsaids, amlodipine,actos   Last echo normal, doubt heart failure  Will ck brain natriuretic p, doubt DVT but ck d dimer with recent travel, ck thyroid function  - D-DIMER, QUANTITATIVE; Future  - Brain Natriuretic Peptide; Future  - CBC; Future  - Basic Metabolic Panel; Future  - TSH with Reflex; Future  Keep legs elevated  Surgical stockings  2. Type 2 diabetes mellitus without complication, without long-term current use of insulin (HCC)  7.3 to 6.3  invokana not covered by new ins  trulucity not covered by new ins  Dose of metformin inclucded  Cont   - metFORMIN (GLUCOPHAGE-XR) 500 MG extended release tablet; Take 2 tablets twice a day  Dispense: 180 tablet; Refill: 1    3. Angina pectoris ([address]  No angina at this time    4. Postmenopausal    - DEXA BONE DENSITY AXIAL SKELETON; Future    5.  Body mass index (BMI) 45.0-49.9, adult  Watch weight  Exercise

## 2021-09-21 ENCOUNTER — OFFICE VISIT (OUTPATIENT)
Dept: SLEEP MEDICINE | Age: 66
End: 2021-09-21
Payer: MEDICARE

## 2021-09-21 VITALS
TEMPERATURE: 97.8 F | BODY MASS INDEX: 45.15 KG/M2 | OXYGEN SATURATION: 98 % | RESPIRATION RATE: 16 BRPM | WEIGHT: 271 LBS | SYSTOLIC BLOOD PRESSURE: 112 MMHG | HEIGHT: 65 IN | HEART RATE: 84 BPM | DIASTOLIC BLOOD PRESSURE: 74 MMHG

## 2021-09-21 DIAGNOSIS — Z99.89 OSA ON CPAP: Primary | ICD-10-CM

## 2021-09-21 DIAGNOSIS — Z99.89 DEPENDENCE ON OTHER ENABLING MACHINES AND DEVICES: ICD-10-CM

## 2021-09-21 DIAGNOSIS — G47.33 OSA ON CPAP: Primary | ICD-10-CM

## 2021-09-21 DIAGNOSIS — E66.01 CLASS 3 SEVERE OBESITY DUE TO EXCESS CALORIES WITH SERIOUS COMORBIDITY AND BODY MASS INDEX (BMI) OF 45.0 TO 49.9 IN ADULT (HCC): ICD-10-CM

## 2021-09-21 PROCEDURE — 1036F TOBACCO NON-USER: CPT | Performed by: PSYCHIATRY & NEUROLOGY

## 2021-09-21 PROCEDURE — G8417 CALC BMI ABV UP PARAM F/U: HCPCS | Performed by: PSYCHIATRY & NEUROLOGY

## 2021-09-21 PROCEDURE — 3017F COLORECTAL CA SCREEN DOC REV: CPT | Performed by: PSYCHIATRY & NEUROLOGY

## 2021-09-21 PROCEDURE — G8400 PT W/DXA NO RESULTS DOC: HCPCS | Performed by: PSYCHIATRY & NEUROLOGY

## 2021-09-21 PROCEDURE — 4040F PNEUMOC VAC/ADMIN/RCVD: CPT | Performed by: PSYCHIATRY & NEUROLOGY

## 2021-09-21 PROCEDURE — G8427 DOCREV CUR MEDS BY ELIG CLIN: HCPCS | Performed by: PSYCHIATRY & NEUROLOGY

## 2021-09-21 PROCEDURE — 1123F ACP DISCUSS/DSCN MKR DOCD: CPT | Performed by: PSYCHIATRY & NEUROLOGY

## 2021-09-21 PROCEDURE — 1090F PRES/ABSN URINE INCON ASSESS: CPT | Performed by: PSYCHIATRY & NEUROLOGY

## 2021-09-21 PROCEDURE — 99214 OFFICE O/P EST MOD 30 MIN: CPT | Performed by: PSYCHIATRY & NEUROLOGY

## 2021-09-21 ASSESSMENT — SLEEP AND FATIGUE QUESTIONNAIRES
HOW LIKELY ARE YOU TO NOD OFF OR FALL ASLEEP WHILE SITTING INACTIVE IN A PUBLIC PLACE: 0
HOW LIKELY ARE YOU TO NOD OFF OR FALL ASLEEP IN A CAR, WHILE STOPPED FOR A FEW MINUTES IN TRAFFIC: 0
HOW LIKELY ARE YOU TO NOD OFF OR FALL ASLEEP WHILE SITTING AND TALKING TO SOMEONE: 0
ESS TOTAL SCORE: 1
HOW LIKELY ARE YOU TO NOD OFF OR FALL ASLEEP WHILE SITTING AND READING: 0
HOW LIKELY ARE YOU TO NOD OFF OR FALL ASLEEP WHILE LYING DOWN TO REST IN THE AFTERNOON WHEN CIRCUMSTANCES PERMIT: 1
HOW LIKELY ARE YOU TO NOD OFF OR FALL ASLEEP WHILE SITTING QUIETLY AFTER LUNCH WITHOUT ALCOHOL: 0
HOW LIKELY ARE YOU TO NOD OFF OR FALL ASLEEP WHEN YOU ARE A PASSENGER IN A CAR FOR AN HOUR WITHOUT A BREAK: 0
HOW LIKELY ARE YOU TO NOD OFF OR FALL ASLEEP WHILE WATCHING TV: 0

## 2021-09-21 ASSESSMENT — ENCOUNTER SYMPTOMS: APNEA: 0

## 2021-09-21 NOTE — PROGRESS NOTES
MD VINOD Green Board Certified in Sleep Medicine  Certified in [address] [place] [address] Certified in Neurology Kandace Cervantes [address] 1400 [facility],  Talha Laws 66  [phone]   515 [facility], [address] [facility]                      [address]  382 [facility] [address] [phone]    Subjective:     Patient ID: Melisa Paniagua is a 76 y.o. female. Chief Complaint   Patient presents with    Follow-up     yearly CPAP f/u        HPI:        Melisa Paniagua is a 76 y.o. female was seen today as a follow for moderate obstructive sleep apnea with an AHI - 27/hr with Low SaO2 - 51% and time below 90% of 16.1% of the time. Patient is using the PAP machine about 100% of the time, more than 4 hours a nightabout  97 %, in total average of 5:33 hours a night in last 30 days. Currently on PAP at 10.6 cm (8-12), the AHI is only 0.5 events per hour at this pressure. Patient improved regarding daytime sleepiness and fatigue, wakes up refreshed in the morning. The Patient scored Total score: 1 on [place] Sleepiness Scale ( more than 10 is indicative of daytime sleepiness)   Patient has no problem with PAP pressure or mask, uses nasal mask.    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         Previous Report(s)Reviewed: historical medical records         Social History     Socioeconomic History    Marital status:      Spouse name: Not on file    Number of children: Not on file    Years of education: Not on file    Highest education level: Not on file   Occupational History    Not on file   Tobacco Use    Smoking status: Never Smoker    Smokeless tobacco: Never Used   Substance and Sexual Activity    Alcohol use: No    Drug use: No    Sexual activity: Yes     Partners: Male   Other Topics Concern    Not on file   Social History Narrative    Not on file     Social Determinants of Health     Financial Resource Strain: Low Risk     Difficulty of Paying Living Expenses: Not hard at all   Food Insecurity: No Food Insecurity    Worried About Running Out of Food in the Last Year: Never true    [address] in the Last Year: Never true   Transportation Needs:     Lack of Transportation (Medical):  Lack of Transportation (Non-Medical):    Physical Activity:     Days of Exercise per Week:     Minutes of Exercise per Session:    Stress:     Feeling of Stress :    Social Connections:     Frequency of Communication with Friends and Family:     Frequency of Social Gatherings with Friends and Family:     Attends Hoahaoism Services:     Active Member of Clubs or Organizations:     Attends Club or Organization Meetings:     Marital Status:    Intimate Partner Violence:     Fear of Current or Ex-Partner:     Emotionally Abused:     Physically Abused:     Sexually Abused:        Prior to Admission medications    Medication Sig Start Date End Date Taking?  Authorizing Provider   metFORMIN (GLUCOPHAGE-XR) 500 MG extended release tablet Take 2 tablets twice a day 9/20/21  Yes Keyshawn Pond MD   minocycline (MINOCIN;DYNACIN) 50 MG capsule Take 1 capsule by mouth 2 times daily 8/17/21  Yes Florentino Winston MD   glimepiride (AMARYL) 4 MG tablet TAKE 1 TABLET BY MOUTH EVERY MORNING 7/23/21  Yes Keyshawn Pond MD   cloNIDine (CATAPRES) 0.2 MG tablet TAKE 1 TABLET BY MOUTH TWICE DAILY 6/2/21  Yes Keyshawn Pond MD   atorvastatin (LIPITOR) 20 MG tablet TAKE ONE TABLET BY MOUTH DAILY 6/2/21  Yes Keyshawn Pond MD   amLODIPine (NORVASC) 5 MG tablet TAKE 1 TABLET BY MOUTH DAILY 6/2/21  Yes Keyshawn Pond MD   oxaprozin (DAYPRO) 600 MG tablet TAKE 1 TABLET BY MOUTH EVERY DAY 5/21/21  Yes Keyshawn Pond MD   albuterol sulfate HFA (PROVENTIL HFA) 108 (90 Base) MCG/ACT inhaler Inhale 2 puffs into the lungs every 6 hours as needed for Wheezing 4/19/21  Yes José Miguel Storm MD   valsartan-hydroCHLOROthiazide (DIOVAN-HCT) 320-25 MG per tablet Take 1 tablet by mouth daily 3/17/21  Yes José Miguel Storm MD   pantoprazole (PROTONIX) 40 MG tablet TAKE 1 TABLET BY MOUTH DAILY 2/2/21  Yes José Miguel Storm MD   blood glucose monitor strips Dispense glucose strips covered by insurance and compatible with her glucose monitor  AIC 8.2  Test glucose twice a day 1/22/21  Yes José Miguel Storm MD   estradiol (ESTRING) 2 MG vaginal ring Place 2 mg vaginally 2/25/19  Yes Historical Provider, MD   aspirin 81 MG EC tablet Take 1 tablet by mouth daily 10/18/19  Yes Laurelyn Najjar, MD   tacrolimus (PROTOPIC) 0.1 % ointment Apply topically to the face 2 times daily if needed for rosacea. 7/23/19  Yes Roz Jacobson MD   clobetasol (TEMOVATE) 0.05 % ointment Apply to affected area on the left thigh twice daily for up to 2 weeks or until improved.  5/3/18  Yes Roz Jacobson MD   mometasone (NASONEX) 50 MCG/ACT nasal spray SHAKE WELL AND USE 2 SPRAYS NASALLY DAILY 10/4/17  Yes José Miguel Storm MD       Allergies as of 09/21/2021 - Fully Reviewed 09/21/2021   Allergen Reaction Noted    Codeine Itching 07/01/2011    Penicillins Itching 07/01/2011       Patient Active Problem List   Diagnosis    Hypertension    Arthritis    History of urinary tract infection    Numbness of fingers    Hyperlipidemia    Elevated blood sugar    Degenerative arthritis of hip    Nasal congestion    Diabetes mellitus ([address]    Esophageal reflux    Obesity    Sleep apnea    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 ([address]    Split ear lobe    Visit for suture removal    Angina pectoris ([address]    Ganglion cyst of finger of right hand    Complete heart block (HCC)    Precordial chest pain    ERIK (acute kidney injury) ([address]    Chest pain    Pacemaker-medtronic       Past Medical History:   Diagnosis Date    Diabetes mellitus ([address]     GERD (gastroesophageal reflux disease)     Hyperlipidemia     Hypertension     Obesity     S/P hip replacement     Sleep apnea        Past Surgical History:   Procedure Laterality Date    JOINT REPLACEMENT      TOTAL HIP ARTHROPLASTY Left Oct '13       Family History   Problem Relation Age of Onset    Heart Disease Mother        Review of Systems   Constitutional: Negative for fatigue. Respiratory: Negative for apnea. Genitourinary: Negative for frequency. Neurological: Negative for headaches. Objective:     Vitals:  Weight BMI Neck circumference    Wt Readings from Last 3 Encounters:   09/21/21 271 lb (122.9 kg)   09/20/21 271 lb (122.9 kg)   08/09/21 263 lb (119.3 kg)    Body mass index is 45.8 kg/m². BP HR SaO2   BP Readings from Last 3 Encounters:   09/21/21 112/74   09/20/21 111/78   08/09/21 116/70    Pulse Readings from Last 3 Encounters:   09/21/21 84   09/20/21 78   08/09/21 73    SpO2 Readings from Last 3 Encounters:   09/21/21 98%   09/20/21 97%   08/09/21 97%        Themandibular molar Class :   [x]1 []2 []3      Mallampati I Airway Classification:   []1 []2 []3 [x]4      Physical Exam  Vitals and nursing note reviewed. Constitutional:       Appearance: Normal appearance. HENT:      Head: Atraumatic. Nose: Nose normal.      Mouth/Throat:      Mouth: Mucous membranes are moist.   Eyes:      Extraocular Movements: Extraocular movements intact. Cardiovascular:      Rate and Rhythm: Normal rate and regular rhythm. Heart sounds: Normal heart sounds. Pulmonary:      Effort: Pulmonary effort is normal.      Breath sounds: Normal breath sounds. Musculoskeletal:         General: Normal range of motion. Cervical back: Normal range of motion and neck supple. Skin:     General: Skin is warm. Neurological:      General: No focal deficit present.    Psychiatric:         Mood and Affect: Mood normal.         :   Moderate Obstructive Sleep Apnea/Hypopnea Syndrome under good control on PAP at 10.6 cmwp. Diagnosis Orders   1. VEL on CPAP     2. Dependence on other enabling machines and devices     3. Class 3 severe obesity due to excess calories with serious comorbidity and body mass index (BMI) of 45.0 to 49.9 in adult [facility])  Ambulatory referral to Bariatrics     Plan: Will continue the PAP at 8-12 cmwp. I will order PAP supplies, mask, filters. ... We discussed the proportionality between weight and AHI. With 10% weight change, the AHI has a 27% proportionate change. With 20% weight change, the AHI has a 45-50% proportionate change. 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 Orders Placed This Encounter   Procedures    Ambulatory referral to Bariatrics       Return in about 1 year (around 9/21/2022) for Reveiwing CPAP usage and compliance report and tro.     Blanchie Mcburney, MD  Medical Director [address] [place] [address]

## 2021-09-23 ENCOUNTER — TELEPHONE (OUTPATIENT)
Dept: PRIMARY CARE CLINIC | Age: 66
End: 2021-09-23

## 2021-09-23 NOTE — TELEPHONE ENCOUNTER
Pt is requesting a refill on the following med:  valsartan-hydroCHLOROthiazide (DIOVAN-HCT) 320-25 MG per tablet   oxaprozin (DAYPRO) 600 MG tablet      [phone]   (pt has run out of medication)   Thank you! Pt is out of medications.    Pt beatriz: [phone]    Last ov: 9/20/21

## 2021-09-24 ENCOUNTER — TELEPHONE (OUTPATIENT)
Dept: PRIMARY CARE CLINIC | Age: 66
End: 2021-09-24

## 2021-09-24 RX ORDER — VALSARTAN AND HYDROCHLOROTHIAZIDE 320; 25 MG/1; MG/1
1 TABLET, FILM COATED ORAL DAILY
Qty: 90 TABLET | Refills: 1 | Status: SHIPPED | OUTPATIENT
Start: 2021-09-24 | End: 2021-09-29 | Stop reason: SDUPTHER

## 2021-09-24 RX ORDER — VALSARTAN AND HYDROCHLOROTHIAZIDE 320; 25 MG/1; MG/1
1 TABLET, FILM COATED ORAL DAILY
Qty: 90 TABLET | Refills: 1 | Status: CANCELLED | OUTPATIENT
Start: 2021-09-24

## 2021-09-24 NOTE — TELEPHONE ENCOUNTER
Pt is requesting a refill on the following med:  valsartan-hydroCHLOROthiazide (DIOVAN-HCT) 320-25 MG per tablet   oxaprozin (DAYPRO) 600 MG tablet      [facility] [phone]   (pt has run out of medication)   Thank you! Pt is out of medications.    Pt beatriz: [phone]    Last ov: 9/20/21

## 2021-09-24 NOTE — TELEPHONE ENCOUNTER
Medication:   Requested Prescriptions     Pending Prescriptions Disp Refills    valsartan-hydroCHLOROthiazide (DIOVAN-HCT) 320-25 MG per tablet 90 tablet 1     Sig: Take 1 tablet by mouth daily    oxaprozin (DAYPRO) 600 MG tablet 60 tablet 3     Sig: TAKE 1 TABLET BY MOUTH EVERY DAY        Last Filled:      Patient Phone Number: [phone] (home)     Last appt: 9/20/2021   Next appt: 10/18/2021    Last OARRS:   RX Monitoring 7/28/2015   Periodic Controlled Substance Monitoring No signs of potential drug abuse or diversion identified.

## 2021-09-27 ENCOUNTER — ANESTHESIA EVENT (OUTPATIENT)
Dept: ENDOSCOPY | Age: 66
End: 2021-09-27
Payer: MEDICARE

## 2021-09-28 ENCOUNTER — ANESTHESIA (OUTPATIENT)
Dept: ENDOSCOPY | Age: 66
End: 2021-09-28
Payer: MEDICARE

## 2021-09-28 ENCOUNTER — HOSPITAL ENCOUNTER (OUTPATIENT)
Age: 66
Setting detail: OUTPATIENT SURGERY
Discharge: HOME | End: 2021-09-28
Attending: INTERNAL MEDICINE | Admitting: INTERNAL MEDICINE
Payer: MEDICARE

## 2021-09-28 VITALS
DIASTOLIC BLOOD PRESSURE: 63 MMHG | SYSTOLIC BLOOD PRESSURE: 108 MMHG | RESPIRATION RATE: 18 BRPM | WEIGHT: 270 LBS | HEART RATE: 62 BPM | BODY MASS INDEX: 46.1 KG/M2 | OXYGEN SATURATION: 98 % | HEIGHT: 64 IN | TEMPERATURE: 97.6 F

## 2021-09-28 VITALS
DIASTOLIC BLOOD PRESSURE: 52 MMHG | OXYGEN SATURATION: 100 % | RESPIRATION RATE: 19 BRPM | SYSTOLIC BLOOD PRESSURE: 88 MMHG

## 2021-09-28 DIAGNOSIS — R12 HEARTBURN: ICD-10-CM

## 2021-09-28 DIAGNOSIS — K21.9 GASTROESOPHAGEAL REFLUX DISEASE, UNSPECIFIED WHETHER ESOPHAGITIS PRESENT: ICD-10-CM

## 2021-09-28 DIAGNOSIS — Z12.11 COLON CANCER SCREENING: ICD-10-CM

## 2021-09-28 LAB
GLUCOSE BLD-MCNC: 150 MG/DL (ref 70–99)
PERFORMED ON: ABNORMAL

## 2021-09-28 PROCEDURE — 7100000011 HC PHASE II RECOVERY - ADDTL 15 MIN: Performed by: INTERNAL MEDICINE

## 2021-09-28 PROCEDURE — 2580000003 HC RX 258: Performed by: ANESTHESIOLOGY

## 2021-09-28 PROCEDURE — 2500000003 HC RX 250 WO HCPCS: Performed by: INTERNAL MEDICINE

## 2021-09-28 PROCEDURE — 3700000000 HC ANESTHESIA ATTENDED CARE: Performed by: INTERNAL MEDICINE

## 2021-09-28 PROCEDURE — 3609010600 HC COLONOSCOPY POLYPECTOMY SNARE/COLD BIOPSY: Performed by: INTERNAL MEDICINE

## 2021-09-28 PROCEDURE — 88305 TISSUE EXAM BY PATHOLOGIST: CPT

## 2021-09-28 PROCEDURE — 6360000002 HC RX W HCPCS: Performed by: NURSE ANESTHETIST, CERTIFIED REGISTERED

## 2021-09-28 PROCEDURE — 3609012400 HC EGD TRANSORAL BIOPSY SINGLE/MULTIPLE: Performed by: INTERNAL MEDICINE

## 2021-09-28 PROCEDURE — 3700000001 HC ADD 15 MINUTES (ANESTHESIA): Performed by: INTERNAL MEDICINE

## 2021-09-28 PROCEDURE — 2500000003 HC RX 250 WO HCPCS: Performed by: NURSE ANESTHETIST, CERTIFIED REGISTERED

## 2021-09-28 PROCEDURE — 2709999900 HC NON-CHARGEABLE SUPPLY: Performed by: INTERNAL MEDICINE

## 2021-09-28 PROCEDURE — 7100000010 HC PHASE II RECOVERY - FIRST 15 MIN: Performed by: INTERNAL MEDICINE

## 2021-09-28 RX ORDER — SODIUM CHLORIDE 0.9 % (FLUSH) 0.9 %
5-40 SYRINGE (ML) INJECTION EVERY 12 HOURS SCHEDULED
Status: DISCONTINUED | OUTPATIENT
Start: 2021-09-28 | End: 2021-09-28 | Stop reason: HOSPADM

## 2021-09-28 RX ORDER — SODIUM CHLORIDE 9 MG/ML
25 INJECTION, SOLUTION INTRAVENOUS PRN
Status: DISCONTINUED | OUTPATIENT
Start: 2021-09-28 | End: 2021-09-28 | Stop reason: HOSPADM

## 2021-09-28 RX ORDER — LIDOCAINE HYDROCHLORIDE 20 MG/ML
INJECTION, SOLUTION INFILTRATION; PERINEURAL PRN
Status: DISCONTINUED | OUTPATIENT
Start: 2021-09-28 | End: 2021-09-28 | Stop reason: SDUPTHER

## 2021-09-28 RX ORDER — PROPOFOL 10 MG/ML
INJECTION, EMULSION INTRAVENOUS PRN
Status: DISCONTINUED | OUTPATIENT
Start: 2021-09-28 | End: 2021-09-28 | Stop reason: SDUPTHER

## 2021-09-28 RX ORDER — LIDOCAINE HYDROCHLORIDE 10 MG/ML
1 INJECTION, SOLUTION EPIDURAL; INFILTRATION; INTRACAUDAL; PERINEURAL
Status: DISCONTINUED | OUTPATIENT
Start: 2021-09-28 | End: 2021-09-28 | Stop reason: HOSPADM

## 2021-09-28 RX ORDER — SODIUM CHLORIDE 0.9 % (FLUSH) 0.9 %
5-40 SYRINGE (ML) INJECTION PRN
Status: DISCONTINUED | OUTPATIENT
Start: 2021-09-28 | End: 2021-09-28 | Stop reason: HOSPADM

## 2021-09-28 RX ORDER — PROPOFOL 10 MG/ML
INJECTION, EMULSION INTRAVENOUS CONTINUOUS PRN
Status: DISCONTINUED | OUTPATIENT
Start: 2021-09-28 | End: 2021-09-28 | Stop reason: SDUPTHER

## 2021-09-28 RX ORDER — SODIUM CHLORIDE, SODIUM LACTATE, POTASSIUM CHLORIDE, CALCIUM CHLORIDE 600; 310; 30; 20 MG/100ML; MG/100ML; MG/100ML; MG/100ML
INJECTION, SOLUTION INTRAVENOUS CONTINUOUS
Status: DISCONTINUED | OUTPATIENT
Start: 2021-09-28 | End: 2021-09-28 | Stop reason: HOSPADM

## 2021-09-28 RX ADMIN — SODIUM CHLORIDE, POTASSIUM CHLORIDE, SODIUM LACTATE AND CALCIUM CHLORIDE: 600; 310; 30; 20 INJECTION, SOLUTION INTRAVENOUS at 08:11

## 2021-09-28 RX ADMIN — LIDOCAINE HYDROCHLORIDE 50 MG: 20 INJECTION, SOLUTION INFILTRATION; PERINEURAL at 09:24

## 2021-09-28 RX ADMIN — PROPOFOL 100 MG: 10 INJECTION, EMULSION INTRAVENOUS at 09:24

## 2021-09-28 RX ADMIN — PROPOFOL 100 MCG/KG/MIN: 10 INJECTION, EMULSION INTRAVENOUS at 09:24

## 2021-09-28 ASSESSMENT — PULMONARY FUNCTION TESTS
PIF_VALUE: 1

## 2021-09-28 ASSESSMENT — PAIN - FUNCTIONAL ASSESSMENT: PAIN_FUNCTIONAL_ASSESSMENT: 0-10

## 2021-09-28 NOTE — PROGRESS NOTES
Pt. Tolerated procedure well. Denies pain &/or nausea post procedure. Discharge instructions reviewed with patient and family member verbalized understanding and have a written copy. Patient left via wheelchair to go home with family member via car. Ambulatory Surgery/Procedure Discharge Note    Vitals:    09/28/21 1021   BP: (!) 80/43   Pulse: 64   Resp: 18   Temp: 97.6 °F (36.4 °C)   SpO2: 95%       In: 700 [I.V.:700]  Out: -     Restroom use offered before discharge. Yes    Pain assessment:  none           Patient discharged to home/self care.  Patient discharged via wheel chair by transporter to waiting family/S.O.       9/28/2021 [id number]

## 2021-09-28 NOTE — OP NOTE
[address] [address], [address]                                OPERATIVE REPORT    PATIENT NAME: Adrienne Rees                      :        1955  MED REC NO:   [id number]                          ROOM:  ACCOUNT NO:   [de-identified]                           ADMIT DATE: 2021  PROVIDER:     Heather Webster MD    DATE OF PROCEDURE:  2021    SURGEON:  Heather Webster MD    INDICATION FOR THE PROCEDURE:  1. Recurrent heartburn, rule out Henning's esophagus. 2.  Colon cancer screening. History of polyps in the remote past.    DESCRIPTION OF PROCEDURE:  EGD:  With the patient in the left lateral  position, and after IV Diprivan, the Olympus video endoscope was  introduced into the esophagus and advanced towards the GE junction where  hiatus hernia and Henning's esophagus were seen. Biopsies from  Henning's were obtained. Stomach was normal and biopsies were obtained  for Helicobacter pylori. The duodenum was normal.  The scope was then  removed without complication. COLONOSCOPY:  The Olympus video colonoscope was then inserted into the  rectum and carefully advanced to the cecum. Diverticulosis of the colon  was seen. 1 cm polyp noted in the descending colon. We removed it with  the biopsy forceps. Similar polyp was noted in the sigmoid. We removed  it with polypectomy snare. No other abnormality. Procedure was  terminated without complication. IMPRESSION:  1. Hiatus hernia. 2.  Henning's esophagus. 3.  Descending colon polyp - removed. 4.  Sigmoid polyp - removed. 5.  Diverticulosis of the colon. ESTIMATED BLOOD LOSS:  None. Vishal Bowles MD    D: 2021 10:32:38       T: 2021 11:24:06     [email or web address]  Job#: [id number]     Doc#: [id number]    CC:   Heather Webster MD Lizzy

## 2021-09-28 NOTE — H&P
History and Physical / Pre-Sedation Assessment    Patient:  Baljinder Damon   :   1955     Intended Procedure:  egd and colonoscopy    HPI: recurrent heartburn. R/o rajan. Colon cancer screening. H/o polyps    Past Medical History:   has a past medical history of Diabetes mellitus ([address] GERD (gastroesophageal reflux disease), Hyperlipidemia, Hypertension, Obesity, S/P hip replacement, and Sleep apnea. Past Surgical History:   has a past surgical history that includes Total hip arthroplasty (Left, Oct '13) and joint replacement. Medications:  Prior to Admission medications    Medication Sig Start Date End Date Taking? Authorizing Provider   valsartan-hydroCHLOROthiazide (DIOVAN-HCT) 320-25 MG per tablet Take 1 tablet by mouth daily 21  Yes Quay Spurling, MD   metFORMIN (GLUCOPHAGE-XR) 500 MG extended release tablet Take 2 tablets twice a day 21  Yes Quay Spurling, MD   minocycline (MINOCIN;DYNACIN) 50 MG capsule Take 1 capsule by mouth 2 times daily 21  Yes Marlon Chau MD   cloNIDine (CATAPRES) 0.2 MG tablet TAKE 1 TABLET BY MOUTH TWICE DAILY 21  Yes Quay Spurling, MD   atorvastatin (LIPITOR) 20 MG tablet TAKE ONE TABLET BY MOUTH DAILY 21  Yes Quay Spurling, MD   pantoprazole (PROTONIX) 40 MG tablet TAKE 1 TABLET BY MOUTH DAILY 21  Yes Quay Spurling, MD   aspirin 81 MG EC tablet Take 1 tablet by mouth daily 10/18/19  Yes Valentino Roche MD   tacrolimus (PROTOPIC) 0.1 % ointment Apply topically to the face 2 times daily if needed for rosacea. 19  Yes Marlon Chau MD   clobetasol (TEMOVATE) 0.05 % ointment Apply to affected area on the left thigh twice daily for up to 2 weeks or until improved.  5/3/18  Yes Marlon Chau MD   glimepiride (AMARYL) 4 MG tablet TAKE 1 TABLET BY MOUTH EVERY MORNING 21   Quay Spurling, MD   amLODIPine (NORVASC) 5 MG tablet TAKE 1 TABLET BY MOUTH DAILY 21   Quay Spurling, MD   oxaprozin (DAYPRO) 600 MG tablet TAKE 1 TABLET BY MOUTH EVERY DAY 5/21/21   Concetta Godoy MD   albuterol sulfate HFA (PROVENTIL HFA) 108 (90 Base) MCG/ACT inhaler Inhale 2 puffs into the lungs every 6 hours as needed for Wheezing 4/19/21   Concetta Godoy MD   blood glucose monitor strips Dispense glucose strips covered by insurance and compatible with her glucose monitor  AIC 8.2  Test glucose twice a day 1/22/21   Concetta Godoy MD   estradiol (ESTRING) 2 MG vaginal ring Place 2 mg vaginally 2/25/19   Historical Provider, MD   mometasone (NASONEX) 50 MCG/ACT nasal spray SHAKE WELL AND USE 2 SPRAYS NASALLY DAILY 10/4/17   Concetta Godoy MD       Family History:  family history includes Heart Disease in her mother. Social History:   reports that she has never smoked. She has never used smokeless tobacco. She reports that she does not drink alcohol and does not use drugs. Allergies:  Codeine and Penicillins    ROS:  twelve point system review was unremarkable except for above noted history. Nurses notes reviewed and agreed. Medications reviewed    Physical Exam:  Vital Signs: /69   Pulse 74   Temp 98.3 °F (36.8 °C) (Temporal)   Resp 18   Ht 5' 4\" (1.626 m)   Wt 270 lb (122.5 kg)   SpO2 96%   BMI 46.35 kg/m²    Skin: normal  HEENT: normal  Neck: supple. No adenopathy. No thyromegaly. No JVD. Pulmonary:Normal  Cardiac:Normal  Abdomen:Normal  MS: normal  Neuro: normal  Ext: no edema. Pulses normal    Pre-Procedure Assessment / Plan:  ASA 2 - Patient with mild systemic disease with no functional limitations  Mallampati Airway Assessment:  Mallampati Class II - (soft palate, fauces & uvula are visible)  Level of Sedation Plan: Moderate sedation  Post Procedure plan: Return to same level of care    I assessed the patient and find that the patient is in satisfactory condition to proceed with the planned procedure and sedation plan. I have explained the risk, benefits, and alternatives to the procedure.  The patient understands and agrees to proceed.   Yes    Erin Joya MD  7:56 AM 9/28/2021

## 2021-09-28 NOTE — ANESTHESIA PRE PROCEDURE
Department of Anesthesiology  Preprocedure Note       Name:  Daniel López   Age:  76 y.o.  :  1955                                          MRN:  [id number]         Date:  2021      Surgeon: Deepali Naik):  Heather Connor MD    Procedure: Procedure(s):  ESOPHAGOGASTRODUODENOSCOPY  COLONOSCOPY    Medications prior to admission:   Prior to Admission medications    Medication Sig Start Date End Date Taking?  Authorizing Provider   valsartan-hydroCHLOROthiazide (DIOVAN-HCT) 320-25 MG per tablet Take 1 tablet by mouth daily 21   Ciaran Fair MD   metFORMIN (GLUCOPHAGE-XR) 500 MG extended release tablet Take 2 tablets twice a day 21   Ciaran Fair MD   minocycline (MINOCIN;DYNACIN) 50 MG capsule Take 1 capsule by mouth 2 times daily 21   Parag Farley MD   glimepiride (AMARYL) 4 MG tablet TAKE 1 TABLET BY MOUTH EVERY MORNING 21   Ciaran Fair MD   cloNIDine (CATAPRES) 0.2 MG tablet TAKE 1 TABLET BY MOUTH TWICE DAILY 21   Ciaran Fair MD   atorvastatin (LIPITOR) 20 MG tablet TAKE ONE TABLET BY MOUTH DAILY 21   Ciaran Fair MD   amLODIPine (NORVASC) 5 MG tablet TAKE 1 TABLET BY MOUTH DAILY 21   Ciaran Fair MD   oxaprozin (DAYPRO) 600 MG tablet TAKE 1 TABLET BY MOUTH EVERY DAY 21   Ciaran Fair MD   albuterol sulfate HFA (PROVENTIL HFA) 108 (90 Base) MCG/ACT inhaler Inhale 2 puffs into the lungs every 6 hours as needed for Wheezing 21   Ciaran Fair MD   pantoprazole (PROTONIX) 40 MG tablet TAKE 1 TABLET BY MOUTH DAILY 21   Ciaran Fair MD   blood glucose monitor strips Dispense glucose strips covered by insurance and compatible with her glucose monitor  AIC 8.2  Test glucose twice a day 21   Ciaran Fair MD   estradiol (ESTRING) 2 MG vaginal ring Place 2 mg vaginally 19   Historical Provider, MD   aspirin 81 MG EC tablet Take 1 tablet by mouth daily 10/18/19   Aba Pink MD   tacrolimus (PROTOPIC) 0.1 % ointment Apply topically to the face 2 times daily if needed for rosacea. 7/23/19   Archana Ortega MD   clobetasol (TEMOVATE) 0.05 % ointment Apply to affected area on the left thigh twice daily for up to 2 weeks or until improved. 5/3/18   Archana Ortega MD   mometasone (NASONEX) 50 MCG/ACT nasal spray SHAKE WELL AND USE 2 SPRAYS NASALLY DAILY 10/4/17   Mihir Alvarez MD       Current medications:    Current Facility-Administered Medications   Medication Dose Route Frequency Provider Last Rate Last Admin    lactated ringers infusion   IntraVENous Continuous Amanda Ward MD        sodium chloride flush 0.9 % injection 5-40 mL  5-40 mL IntraVENous 2 times per day Amanda Ward MD        sodium chloride flush 0.9 % injection 5-40 mL  5-40 mL IntraVENous PRN Amanda Ward MD        0.9 % sodium chloride infusion  25 mL IntraVENous PRN Amanda Ward MD        lidocaine PF 1 % injection 1 mL  1 mL IntraDERmal Once PRN Amanda Ward MD           Allergies:     Allergies   Allergen Reactions    Codeine Itching    Penicillins Itching       Problem List:    Patient Active Problem List   Diagnosis Code    Hypertension I10    Arthritis M19.90    History of urinary tract infection Z87.440    Numbness of fingers R20.0    Hyperlipidemia E78.5    Elevated blood sugar R73.9    Degenerative arthritis of hip M16.9    Nasal congestion R09.81    Diabetes mellitus (HCC) E11.9    Esophageal reflux K21.9    Obesity E66.9    Sleep apnea G47.30    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 ([facility]) T84.069A    Split ear lobe Q17.8    Visit for suture removal Z48.02    Angina pectoris (HCC) I20.9    Ganglion cyst of finger of right hand M67.441    Complete heart block (HCC) I44.2    Precordial chest pain R07.2    ERIK (acute kidney injury) ([facility] N17.9    Chest pain R07.9    Pacemaker-medtronic Z95.0       Past Medical History:        Diagnosis Date    Diabetes mellitus (HCC)     GERD (gastroesophageal reflux disease)     Hyperlipidemia     Hypertension     Obesity     S/P hip replacement     Sleep apnea        Past Surgical History:        Procedure Laterality Date    JOINT REPLACEMENT      TOTAL HIP ARTHROPLASTY Left Oct '13       Social History:    Social History     Tobacco Use    Smoking status: Never Smoker    Smokeless tobacco: Never Used   Substance Use Topics    Alcohol use: No                                Counseling given: Not Answered      Vital Signs (Current): There were no vitals filed for this visit. BP Readings from Last 3 Encounters:   09/21/21 112/74   09/20/21 111/78   08/09/21 116/70       NPO Status:                                                                                 BMI:   Wt Readings from Last 3 Encounters:   09/21/21 271 lb (122.9 kg)   09/20/21 271 lb (122.9 kg)   08/09/21 263 lb (119.3 kg)     There is no height or weight on file to calculate BMI.    CBC:   Lab Results   Component Value Date    WBC 7.2 04/27/2021    RBC 3.78 04/27/2021    HGB 11.0 04/27/2021    HCT 33.4 04/27/2021    MCV 88.3 04/27/2021    RDW 15.5 04/27/2021     04/27/2021       CMP:   Lab Results   Component Value Date     07/28/2021    K 4.7 07/28/2021    K 4.1 04/25/2021     07/28/2021    CO2 29 07/28/2021    BUN 27 07/28/2021    CREATININE 1.0 07/28/2021    GFRAA >60 07/28/2021    GFRAA >60 12/28/2012    AGRATIO 1.1 04/25/2021    LABGLOM 55 07/28/2021    LABGLOM 65 09/12/2012    LABGLOM 54 09/12/2012    GLUCOSE 92 07/28/2021    PROT 6.2 04/25/2021    PROT 7.4 12/28/2012    CALCIUM 10.0 07/28/2021    BILITOT 0.9 04/25/2021    ALKPHOS 65 04/25/2021    AST 16 04/25/2021    ALT 20 04/25/2021       POC Tests: No results for input(s): POCGLU, POCNA, POCK, POCCL, POCBUN, POCHEMO, POCHCT in the last 72 hours.     Coags:   Lab Results   Component Value Date    PROTIME 11.1 02/09/2018    INR 0.98 02/09/2018    APTT 30.4 10/22/2013       HCG (If Applicable): No results found for: PREGTESTUR, PREGSERUM, HCG, HCGQUANT     ABGs: No results found for: PHART, PO2ART, LAU9EDL, RRH5HWS, BEART, Q2OHSVFD     Type & Screen (If Applicable):  No results found for: LABABO, LABRH    Drug/Infectious Status (If Applicable):  No results found for: HIV, HEPCAB    COVID-19 Screening (If Applicable): No results found for: COVID19        Anesthesia Evaluation  Patient summary reviewed no history of anesthetic complications:   Airway: Mallampati: I  TM distance: >3 FB   Neck ROM: full  Mouth opening: > = 3 FB Dental: normal exam         Pulmonary:   (+) sleep apnea: on CPAP,                             Cardiovascular:    (+) hypertension:, pacemaker:,                ROS comment:  Summary   Suboptimal image quality. Left ventricular cavity size is normal.   Ejection fraction is visually estimated to be 60%. No regional wall motion abnormalities are noted. Normal diastolic function. The left atrium is dilated. The right ventricle is dilated. Right ventricular systolic function is normal.      Signature      ------------------------------------------------------------------   Electronically signed by Lm Mcdonnell MD (Interpreting   physician) on 04/26/2021 at 04:23 PM   ------------------------------------------------------------------     Neuro/Psych:               GI/Hepatic/Renal:   (+) GERD:,           Endo/Other:    (+) Diabetes, : arthritis:., .                 Abdominal:             Vascular: Other Findings:             Anesthesia Plan      MAC     ASA 3       Induction: intravenous. Anesthetic plan and risks discussed with patient.                       Mitchell Painting MD   9/28/2021

## 2021-09-29 RX ORDER — VALSARTAN AND HYDROCHLOROTHIAZIDE 320; 25 MG/1; MG/1
1 TABLET, FILM COATED ORAL DAILY
Qty: 90 TABLET | Refills: 1 | Status: SHIPPED | OUTPATIENT
Start: 2021-09-29 | End: 2022-08-09 | Stop reason: SDUPTHER

## 2021-10-09 DIAGNOSIS — M79.605 PAIN IN BOTH LOWER EXTREMITIES: Primary | ICD-10-CM

## 2021-10-09 DIAGNOSIS — M79.604 PAIN IN BOTH LOWER EXTREMITIES: Primary | ICD-10-CM

## 2021-10-09 DIAGNOSIS — R60.0 BILATERAL LEG EDEMA: ICD-10-CM

## 2021-10-11 ENCOUNTER — HOSPITAL ENCOUNTER (OUTPATIENT)
Dept: VASCULAR LAB | Age: 66
Discharge: HOME | End: 2021-10-11
Payer: MEDICARE

## 2021-10-11 ENCOUNTER — TELEPHONE (OUTPATIENT)
Dept: PRIMARY CARE CLINIC | Age: 66
End: 2021-10-11

## 2021-10-11 DIAGNOSIS — M79.605 PAIN IN BOTH LOWER EXTREMITIES: ICD-10-CM

## 2021-10-11 DIAGNOSIS — M79.604 PAIN IN BOTH LOWER EXTREMITIES: ICD-10-CM

## 2021-10-11 DIAGNOSIS — R60.0 BILATERAL LEG EDEMA: ICD-10-CM

## 2021-10-11 PROCEDURE — 93970 EXTREMITY STUDY: CPT

## 2021-10-12 ENCOUNTER — ANESTHESIA EVENT (OUTPATIENT)
Dept: ENDOSCOPY | Age: 66
End: 2021-10-12
Payer: MEDICARE

## 2021-10-13 ENCOUNTER — HOSPITAL ENCOUNTER (OUTPATIENT)
Age: 66
Setting detail: OUTPATIENT SURGERY
Discharge: HOME | End: 2021-10-13
Attending: INTERNAL MEDICINE | Admitting: INTERNAL MEDICINE
Payer: MEDICARE

## 2021-10-13 ENCOUNTER — ANESTHESIA (OUTPATIENT)
Dept: ENDOSCOPY | Age: 66
End: 2021-10-13
Payer: MEDICARE

## 2021-10-13 VITALS
HEART RATE: 74 BPM | RESPIRATION RATE: 18 BRPM | HEIGHT: 64 IN | DIASTOLIC BLOOD PRESSURE: 63 MMHG | TEMPERATURE: 98 F | OXYGEN SATURATION: 92 % | BODY MASS INDEX: 46.1 KG/M2 | WEIGHT: 270 LBS | SYSTOLIC BLOOD PRESSURE: 113 MMHG

## 2021-10-13 VITALS
OXYGEN SATURATION: 99 % | SYSTOLIC BLOOD PRESSURE: 120 MMHG | RESPIRATION RATE: 21 BRPM | DIASTOLIC BLOOD PRESSURE: 72 MMHG

## 2021-10-13 DIAGNOSIS — K22.70 BARRETT'S ESOPHAGUS WITHOUT DYSPLASIA: ICD-10-CM

## 2021-10-13 LAB
GLUCOSE BLD-MCNC: 110 MG/DL (ref 70–99)
PERFORMED ON: ABNORMAL

## 2021-10-13 PROCEDURE — 3609012400 HC EGD TRANSORAL BIOPSY SINGLE/MULTIPLE: Performed by: INTERNAL MEDICINE

## 2021-10-13 PROCEDURE — 2500000003 HC RX 250 WO HCPCS: Performed by: NURSE ANESTHETIST, CERTIFIED REGISTERED

## 2021-10-13 PROCEDURE — 88305 TISSUE EXAM BY PATHOLOGIST: CPT

## 2021-10-13 PROCEDURE — 2580000003 HC RX 258: Performed by: INTERNAL MEDICINE

## 2021-10-13 PROCEDURE — 7100000010 HC PHASE II RECOVERY - FIRST 15 MIN: Performed by: INTERNAL MEDICINE

## 2021-10-13 PROCEDURE — 3609013200 HC EGD W/ ABLATION: Performed by: INTERNAL MEDICINE

## 2021-10-13 PROCEDURE — 3700000001 HC ADD 15 MINUTES (ANESTHESIA): Performed by: INTERNAL MEDICINE

## 2021-10-13 PROCEDURE — 2709999900 HC NON-CHARGEABLE SUPPLY: Performed by: INTERNAL MEDICINE

## 2021-10-13 PROCEDURE — 7100000011 HC PHASE II RECOVERY - ADDTL 15 MIN: Performed by: INTERNAL MEDICINE

## 2021-10-13 PROCEDURE — 6360000002 HC RX W HCPCS: Performed by: NURSE ANESTHETIST, CERTIFIED REGISTERED

## 2021-10-13 PROCEDURE — 3700000000 HC ANESTHESIA ATTENDED CARE: Performed by: INTERNAL MEDICINE

## 2021-10-13 PROCEDURE — 6360000002 HC RX W HCPCS: Performed by: INTERNAL MEDICINE

## 2021-10-13 PROCEDURE — C1888 ENDOVAS NON-CARDIAC ABL CATH: HCPCS | Performed by: INTERNAL MEDICINE

## 2021-10-13 RX ORDER — PANTOPRAZOLE SODIUM 40 MG/1
40 TABLET, DELAYED RELEASE ORAL
Qty: 60 TABLET | Refills: 2 | Status: SHIPPED | OUTPATIENT
Start: 2021-10-13 | End: 2021-11-11

## 2021-10-13 RX ORDER — SODIUM CHLORIDE, SODIUM LACTATE, POTASSIUM CHLORIDE, CALCIUM CHLORIDE 600; 310; 30; 20 MG/100ML; MG/100ML; MG/100ML; MG/100ML
INJECTION, SOLUTION INTRAVENOUS CONTINUOUS
Status: DISCONTINUED | OUTPATIENT
Start: 2021-10-13 | End: 2021-10-13

## 2021-10-13 RX ORDER — SODIUM CHLORIDE 9 MG/ML
INJECTION, SOLUTION INTRAVENOUS CONTINUOUS
Status: DISCONTINUED | OUTPATIENT
Start: 2021-10-13 | End: 2021-10-13 | Stop reason: HOSPADM

## 2021-10-13 RX ORDER — PROPOFOL 10 MG/ML
INJECTION, EMULSION INTRAVENOUS PRN
Status: DISCONTINUED | OUTPATIENT
Start: 2021-10-13 | End: 2021-10-13 | Stop reason: SDUPTHER

## 2021-10-13 RX ORDER — LIDOCAINE HYDROCHLORIDE 20 MG/ML
INJECTION, SOLUTION EPIDURAL; INFILTRATION; INTRACAUDAL; PERINEURAL PRN
Status: DISCONTINUED | OUTPATIENT
Start: 2021-10-13 | End: 2021-10-13 | Stop reason: SDUPTHER

## 2021-10-13 RX ORDER — ACETYLCYSTEINE 200 MG/ML
SOLUTION ORAL; RESPIRATORY (INHALATION) PRN
Status: DISCONTINUED | OUTPATIENT
Start: 2021-10-13 | End: 2021-10-13 | Stop reason: ALTCHOICE

## 2021-10-13 RX ORDER — MELOXICAM 15 MG/1
TABLET ORAL
COMMUNITY
Start: 2021-10-11 | End: 2022-05-04

## 2021-10-13 RX ADMIN — PROPOFOL 50 MG: 10 INJECTION, EMULSION INTRAVENOUS at 08:16

## 2021-10-13 RX ADMIN — PROPOFOL 50 MG: 10 INJECTION, EMULSION INTRAVENOUS at 08:12

## 2021-10-13 RX ADMIN — LIDOCAINE HYDROCHLORIDE 100 MG: 20 INJECTION, SOLUTION EPIDURAL; INFILTRATION; INTRACAUDAL; PERINEURAL at 08:03

## 2021-10-13 RX ADMIN — PROPOFOL 50 MG: 10 INJECTION, EMULSION INTRAVENOUS at 08:08

## 2021-10-13 RX ADMIN — SODIUM CHLORIDE: 9 INJECTION, SOLUTION INTRAVENOUS at 07:11

## 2021-10-13 RX ADMIN — PROPOFOL 100 MG: 10 INJECTION, EMULSION INTRAVENOUS at 08:03

## 2021-10-13 ASSESSMENT — PULMONARY FUNCTION TESTS
PIF_VALUE: 1
PIF_VALUE: 0
PIF_VALUE: 0
PIF_VALUE: 1

## 2021-10-13 ASSESSMENT — PAIN - FUNCTIONAL ASSESSMENT: PAIN_FUNCTIONAL_ASSESSMENT: 0-10

## 2021-10-13 NOTE — OP NOTE
[address]               [address] [place], [address]                                OPERATIVE REPORT    PATIENT NAME: Nasir Garrido                      :        1955  MED REC NO:   [id number]                          ROOM:  ACCOUNT NO:   [de-identified]                           ADMIT DATE: 10/13/2021  PROVIDER:     Svitlana Strong MD    DATE OF PROCEDURE:  10/13/2021    SURGEON:  Svitlana Strong MD    INDICATION FOR PROCEDURE:  Radiofrequency ablation for Henning's  esophagus. DESCRIPTION OF THE PROCEDURE:  With the patient in the left lateral  position and after IV Diprivan, the Olympus video colonoscope was  introduced into the esophagus and advanced toward the gastroesophageal  junction. Hiatus hernia and Henning's esophagus were noted. Radiofrequency ablation was performed using a TTS electrode. The  stomach was carefully inspected and it was normal.  Biopsy was obtained  for Helicobacter pylori. The duodenum was normal.  Scope was then  removed without complication. IMPRESSION:  1. Hiatus hernia. 2.  Henning's esophagus. RFA was performed. EBL:  None.         Feliberto Gray MD    D: 10/13/2021 8:38:41       T: 10/13/2021 13:14:26     [email or web address]  Job#: [id number]     Doc#: [id number]    CC:  MD Kasia Todd MD

## 2021-10-13 NOTE — H&P
History and Physical / Pre-Sedation Assessment    Patient:  Ema Will   :   1955     Intended Procedure:  egd and rfa    HPI: rajan esophagus    Past Medical History:   has a past medical history of Diabetes mellitus ([address] GERD (gastroesophageal reflux disease), Hyperlipidemia, Hypertension, Obesity, S/P hip replacement, and Sleep apnea. Past Surgical History:   has a past surgical history that includes Total hip arthroplasty (Left, Oct '13); joint replacement; Upper gastrointestinal endoscopy (N/A, 2021); and Colonoscopy (2021). Medications:  Prior to Admission medications    Medication Sig Start Date End Date Taking? Authorizing Provider   valsartan-hydroCHLOROthiazide (DIOVAN-HCT) 320-25 MG per tablet Take 1 tablet by mouth daily 21  Yes Catherine Fiore MD   metFORMIN (GLUCOPHAGE-XR) 500 MG extended release tablet Take 2 tablets twice a day 21  Yes Catherine Fiore MD   minocycline (MINOCIN;DYNACIN) 50 MG capsule Take 1 capsule by mouth 2 times daily 21  Yes Florence Correa MD   cloNIDine (CATAPRES) 0.2 MG tablet TAKE 1 TABLET BY MOUTH TWICE DAILY 21  Yes Catherine Fiore MD   atorvastatin (LIPITOR) 20 MG tablet TAKE ONE TABLET BY MOUTH DAILY 21  Yes Catherine Firoe MD   amLODIPine (NORVASC) 5 MG tablet TAKE 1 TABLET BY MOUTH DAILY 21  Yes Catherine Fiore MD   albuterol sulfate HFA (PROVENTIL HFA) 108 (90 Base) MCG/ACT inhaler Inhale 2 puffs into the lungs every 6 hours as needed for Wheezing 21  Yes Catherine Fiore MD   pantoprazole (PROTONIX) 40 MG tablet TAKE 1 TABLET BY MOUTH DAILY 21  Yes Catherine Fiore MD   blood glucose monitor strips Dispense glucose strips covered by insurance and compatible with her glucose monitor  AIC 8.2  Test glucose twice a day 21  Yes Catherine Fiore MD   tacrolimus (PROTOPIC) 0.1 % ointment Apply topically to the face 2 times daily if needed for rosacea.  19  Yes Florence Correa MD   meloxicam (MOBIC) 15 MG tablet  10/11/21   Historical Provider, MD   oxaprozin (DAYPRO) 600 MG tablet TAKE 1 TABLET BY MOUTH EVERY DAY 9/29/21   Sumanth Garcia MD   glimepiride (AMARYL) 4 MG tablet TAKE 1 TABLET BY MOUTH EVERY MORNING 7/23/21   Sumanth Garcia MD   estradiol [facility]) 2 MG vaginal ring Place 2 mg vaginally 2/25/19   Historical Provider, MD   clobetasol (TEMOVATE) 0.05 % ointment Apply to affected area on the left thigh twice daily for up to 2 weeks or until improved. 5/3/18   Maddison Lazaro MD   mometasone (NASONEX) 50 MCG/ACT nasal spray SHAKE WELL AND USE 2 SPRAYS NASALLY DAILY 10/4/17   Sumanth Garcia MD       Family History:  family history includes Heart Disease in her mother. Social History:   reports that she has never smoked. She has never used smokeless tobacco. She reports that she does not drink alcohol and does not use drugs. Allergies:  Codeine and Penicillins    ROS:  twelve point system review was unremarkable except for above noted history. Nurses notes reviewed and agreed. Medications reviewed    Physical Exam:  Vital Signs: BP (!) 161/90   Pulse 80   Temp 98.4 °F (36.9 °C) (Oral)   Resp 18   Ht 5' 4\" (1.626 m)   Wt 270 lb (122.5 kg)   SpO2 100%   BMI 46.35 kg/m²    Skin: normal  HEENT: normal  Neck: supple. No adenopathy. No thyromegaly. No JVD. Pulmonary:Normal  Cardiac:Normal  Abdomen:Normal  MS: normal  Neuro: normal  Ext: no edema. Pulses normal    Pre-Procedure Assessment / Plan:  ASA 2 - Patient with mild systemic disease with no functional limitations  Mallampati Airway Assessment:  Mallampati Class II - (soft palate, fauces & uvula are visible)  Level of Sedation Plan: Moderate sedation  Post Procedure plan: Return to same level of care    I assessed the patient and find that the patient is in satisfactory condition to proceed with the planned procedure and sedation plan. I have explained the risk, benefits, and alternatives to the procedure.  The patient understands and agrees to proceed.   Yes    Bea Fischer MD  7:53 AM 10/13/2021

## 2021-10-13 NOTE — PROGRESS NOTES
Pt. Tolerated procedure well. Denies pain &/or nausea post procedure. Discharge instructions reviewed with patient and family member verbalized understanding and have a written copy. Patient left via wheelchair to go home with family member via [place]. Ambulatory Surgery/Procedure Discharge Note    Vitals:    10/13/21 0829   BP: 98/78   Pulse: 94   Resp: 18   Temp: 98 °F (36.7 °C)   SpO2: 94%       In: 450 [I.V.:450]  Out: -     Restroom use offered before discharge. Yes    Pain assessment:  none  Pain Level: 0        Patient discharged to home/self care.  Patient discharged via wheel chair by transporter to waiting family/S.O.       10/13/2021 4403

## 2021-10-13 NOTE — ANESTHESIA PRE PROCEDURE
Department of Anesthesiology  Preprocedure Note       Name:  Tyrell Amador   Age:  76 y.o.  :  1955                                          MRN:  [id number]         Date:  10/13/2021      Surgeon: Fiordaliza Salmeron):  Darya Sotelo MD    Procedure: Procedure(s):  ESOPHAGOGASTRODUODENOSCOPY RADIOFREQUENCY ABLATION    Medications prior to admission:   Prior to Admission medications    Medication Sig Start Date End Date Taking? Authorizing Provider   valsartan-hydroCHLOROthiazide (DIOVAN-HCT) 320-25 MG per tablet Take 1 tablet by mouth daily 21  Yes Clay Davey MD   metFORMIN (GLUCOPHAGE-XR) 500 MG extended release tablet Take 2 tablets twice a day 21  Yes Clay Davey MD   minocycline (MINOCIN;DYNACIN) 50 MG capsule Take 1 capsule by mouth 2 times daily 21  Yes Oseas Pisano MD   cloNIDine (CATAPRES) 0.2 MG tablet TAKE 1 TABLET BY MOUTH TWICE DAILY 21  Yes Clay Davey MD   atorvastatin (LIPITOR) 20 MG tablet TAKE ONE TABLET BY MOUTH DAILY 21  Yes Clay Davey MD   amLODIPine (NORVASC) 5 MG tablet TAKE 1 TABLET BY MOUTH DAILY 21  Yes Clay Davey MD   albuterol sulfate HFA (PROVENTIL HFA) 108 (90 Base) MCG/ACT inhaler Inhale 2 puffs into the lungs every 6 hours as needed for Wheezing 21  Yes Clay Davey MD   pantoprazole (PROTONIX) 40 MG tablet TAKE 1 TABLET BY MOUTH DAILY 21  Yes lCay Davey MD   blood glucose monitor strips Dispense glucose strips covered by insurance and compatible with her glucose monitor  AIC 8.2  Test glucose twice a day 21  Yes Clay Davey MD   tacrolimus (PROTOPIC) 0.1 % ointment Apply topically to the face 2 times daily if needed for rosacea.  19  Yes Oseas Pisano MD   meloxicam [facility]) 15 MG tablet  10/11/21   Historical Provider, MD   oxaprozin (DAYPRO) 600 MG tablet TAKE 1 TABLET BY MOUTH EVERY DAY 21   Clay Davey MD   glimepiride (AMARYL) 4 MG tablet TAKE 1 TABLET BY MOUTH EVERY MORNING 21   Daljit Jamal Pepito Maria MD   estradiol (ESTRING) 2 MG vaginal ring Place 2 mg vaginally 2/25/19   Historical Provider, MD   clobetasol (TEMOVATE) 0.05 % ointment Apply to affected area on the left thigh twice daily for up to 2 weeks or until improved. 5/3/18   Xavi eMrida MD   mometasone (NASONEX) 50 MCG/ACT nasal spray SHAKE WELL AND USE 2 SPRAYS NASALLY DAILY 10/4/17   Fred Goel MD       Current medications:    Current Facility-Administered Medications   Medication Dose Route Frequency Provider Last Rate Last Admin    0.9 % sodium chloride infusion   IntraVENous Continuous Wade Gomez  mL/hr at 10/13/21 0711 New Bag at 10/13/21 0711       Allergies:     Allergies   Allergen Reactions    Codeine Itching    Penicillins Itching       Problem List:    Patient Active Problem List   Diagnosis Code    Hypertension I10    Arthritis M19.90    History of urinary tract infection Z87.440    Numbness of fingers R20.0    Hyperlipidemia E78.5    Elevated blood sugar R73.9    Degenerative arthritis of hip M16.9    Nasal congestion R09.81    Diabetes mellitus (HCC) E11.9    Esophageal reflux K21.9    Obesity E66.9    Sleep apnea G47.30    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    Split ear lobe Q17.8    Visit for suture removal Z48.02    Angina pectoris (HCC) I20.9    Ganglion cyst of finger of right hand M67.441    Complete heart block (HCC) I44.2    Precordial chest pain R07.2    ERIK (acute kidney injury) ([facility] [address] N17.9    Chest pain R07.9    Pacemaker-medtronic Z95.0       Past Medical History:        Diagnosis Date    Diabetes mellitus ([facility] [address]     GERD (gastroesophageal reflux disease)     Hyperlipidemia     Hypertension     Obesity     S/P hip replacement     Sleep apnea        Past Surgical History:        Procedure Laterality Date    COLONOSCOPY  9/28/2021    COLONOSCOPY POLYPECTOMY SNARE/COLD BIOPSY performed by Wade Gomez MD at [address] [place] [address]  TOTAL HIP ARTHROPLASTY Left Oct '13    UPPER GASTROINTESTINAL ENDOSCOPY N/A 9/28/2021    EGD BIOPSY performed by Julia Palomo MD at [address] History:    Social History     Tobacco Use    Smoking status: Never Smoker    Smokeless tobacco: Never Used   Substance Use Topics    Alcohol use: No                                Counseling given: Not Answered      Vital Signs (Current):   Vitals:    10/13/21 0641   BP: (!) 161/90   Pulse: 80   Resp: 18   Temp: 98.4 °F (36.9 °C)   TempSrc: Oral   SpO2: 100%   Weight: 270 lb (122.5 kg)   Height: 5' 4\" (1.626 m)                                              BP Readings from Last 3 Encounters:   10/13/21 (!) 161/90   09/28/21 (!) 88/52   09/28/21 108/63       NPO Status: Time of last liquid consumption: 2000                        Time of last solid consumption: 2000                        Date of last liquid consumption: 10/12/21                        Date of last solid food consumption: 10/12/21    BMI:   Wt Readings from Last 3 Encounters:   10/13/21 270 lb (122.5 kg)   09/28/21 270 lb (122.5 kg)   09/21/21 271 lb (122.9 kg)     Body mass index is 46.35 kg/m².     CBC:   Lab Results   Component Value Date    WBC 6.0 10/06/2021    RBC 4.11 10/06/2021    HGB 11.6 10/06/2021    HCT 36.2 10/06/2021    MCV 88.0 10/06/2021    RDW 16.0 10/06/2021     10/06/2021       CMP:   Lab Results   Component Value Date     10/06/2021    K 4.4 10/06/2021    K 4.1 04/25/2021     10/06/2021    CO2 27 10/06/2021    BUN 20 10/06/2021    CREATININE 1.0 10/06/2021    GFRAA >60 10/06/2021    GFRAA >60 12/28/2012    AGRATIO 1.1 04/25/2021    LABGLOM 55 10/06/2021    LABGLOM 65 09/12/2012    LABGLOM 54 09/12/2012    GLUCOSE 105 10/06/2021    PROT 6.2 04/25/2021    PROT 7.4 12/28/2012    CALCIUM 9.7 10/06/2021    BILITOT 0.9 04/25/2021    ALKPHOS 65 04/25/2021    AST 16 04/25/2021    ALT 20 04/25/2021       POC Tests:   Recent Labs     10/13/21  0706   POCGLU 110*       Coags:   Lab Results   Component Value Date    PROTIME 11.1 02/09/2018    INR 0.98 02/09/2018    APTT 30.4 10/22/2013       HCG (If Applicable): No results found for: PREGTESTUR, PREGSERUM, HCG, HCGQUANT     ABGs: No results found for: PHART, PO2ART, KZQ1RQZ, MBY3HWH, BEART, C4FPMKHZ     Type & Screen (If Applicable):  No results found for: LABABO, LABRH    Drug/Infectious Status (If Applicable):  No results found for: HIV, HEPCAB    COVID-19 Screening (If Applicable): No results found for: COVID19        Anesthesia Evaluation  Patient summary reviewed and Nursing notes reviewed no history of anesthetic complications:   Airway: Mallampati: I  TM distance: <3 FB   Neck ROM: full  Mouth opening: > = 3 FB Dental: normal exam         Pulmonary:normal exam  breath sounds clear to auscultation  (+) sleep apnea: on CPAP,                             Cardiovascular:  Exercise tolerance: good (>4 METS),   (+) hypertension:, pacemaker:,       ECG reviewed  Rhythm: regular  Rate: normal           Beta Blocker:  Not on Beta Blocker         Neuro/Psych:   Negative Neuro/Psych ROS              GI/Hepatic/Renal:   (+) GERD:,           Endo/Other:    (+) DiabetesType II DM, , .                 Abdominal:             Vascular: negative vascular ROS. Other Findings:             Anesthesia Plan      MAC     ASA 3       Induction: intravenous. Anesthetic plan and risks discussed with patient. Plan discussed with CRNA.     Attending anesthesiologist reviewed and agrees with Preprocedure content              Lisa Bush DO   10/13/2021

## 2021-10-18 ENCOUNTER — OFFICE VISIT (OUTPATIENT)
Dept: PRIMARY CARE CLINIC | Age: 66
End: 2021-10-18
Payer: MEDICARE

## 2021-10-18 VITALS
SYSTOLIC BLOOD PRESSURE: 137 MMHG | BODY MASS INDEX: 44.05 KG/M2 | TEMPERATURE: 97.2 F | HEART RATE: 99 BPM | WEIGHT: 258 LBS | HEIGHT: 64 IN | DIASTOLIC BLOOD PRESSURE: 89 MMHG

## 2021-10-18 DIAGNOSIS — Z23 NEED FOR SHINGLES VACCINE: ICD-10-CM

## 2021-10-18 DIAGNOSIS — E11.9 CONTROLLED TYPE 2 DIABETES MELLITUS WITHOUT COMPLICATION, WITHOUT LONG-TERM CURRENT USE OF INSULIN (HCC): ICD-10-CM

## 2021-10-18 DIAGNOSIS — Z00.00 ROUTINE GENERAL MEDICAL EXAMINATION AT A HEALTH CARE FACILITY: Primary | ICD-10-CM

## 2021-10-18 DIAGNOSIS — I10 ESSENTIAL HYPERTENSION: ICD-10-CM

## 2021-10-18 PROCEDURE — G8427 DOCREV CUR MEDS BY ELIG CLIN: HCPCS | Performed by: FAMILY MEDICINE

## 2021-10-18 PROCEDURE — 3017F COLORECTAL CA SCREEN DOC REV: CPT | Performed by: FAMILY MEDICINE

## 2021-10-18 PROCEDURE — 4040F PNEUMOC VAC/ADMIN/RCVD: CPT | Performed by: FAMILY MEDICINE

## 2021-10-18 PROCEDURE — G8417 CALC BMI ABV UP PARAM F/U: HCPCS | Performed by: FAMILY MEDICINE

## 2021-10-18 PROCEDURE — 99213 OFFICE O/P EST LOW 20 MIN: CPT | Performed by: FAMILY MEDICINE

## 2021-10-18 PROCEDURE — 2022F DILAT RTA XM EVC RTNOPTHY: CPT | Performed by: FAMILY MEDICINE

## 2021-10-18 PROCEDURE — 1123F ACP DISCUSS/DSCN MKR DOCD: CPT | Performed by: FAMILY MEDICINE

## 2021-10-18 PROCEDURE — 1036F TOBACCO NON-USER: CPT | Performed by: FAMILY MEDICINE

## 2021-10-18 PROCEDURE — 1090F PRES/ABSN URINE INCON ASSESS: CPT | Performed by: FAMILY MEDICINE

## 2021-10-18 PROCEDURE — G8484 FLU IMMUNIZE NO ADMIN: HCPCS | Performed by: FAMILY MEDICINE

## 2021-10-18 PROCEDURE — G0402 INITIAL PREVENTIVE EXAM: HCPCS | Performed by: FAMILY MEDICINE

## 2021-10-18 PROCEDURE — 3044F HG A1C LEVEL LT 7.0%: CPT | Performed by: FAMILY MEDICINE

## 2021-10-18 PROCEDURE — G8400 PT W/DXA NO RESULTS DOC: HCPCS | Performed by: FAMILY MEDICINE

## 2021-10-18 RX ORDER — ZOSTER VACCINE RECOMBINANT, ADJUVANTED 50 MCG/0.5
0.5 KIT INTRAMUSCULAR ONCE
Qty: 0.5 ML | Refills: 0 | Status: SHIPPED | OUTPATIENT
Start: 2021-10-18 | End: 2021-10-18

## 2021-10-18 RX ORDER — CLONIDINE HYDROCHLORIDE 0.2 MG/1
TABLET ORAL
Qty: 180 TABLET | Refills: 1 | Status: SHIPPED | OUTPATIENT
Start: 2021-10-18 | End: 2022-08-09 | Stop reason: SDUPTHER

## 2021-10-18 RX ORDER — MINOCYCLINE HYDROCHLORIDE 50 MG/1
CAPSULE ORAL
Qty: 60 CAPSULE | Refills: 0 | Status: SHIPPED | OUTPATIENT
Start: 2021-10-18 | End: 2022-02-15 | Stop reason: SDUPTHER

## 2021-10-18 ASSESSMENT — PATIENT HEALTH QUESTIONNAIRE - PHQ9
SUM OF ALL RESPONSES TO PHQ QUESTIONS 1-9: 0
SUM OF ALL RESPONSES TO PHQ QUESTIONS 1-9: 0
2. FEELING DOWN, DEPRESSED OR HOPELESS: 0
SUM OF ALL RESPONSES TO PHQ QUESTIONS 1-9: 0
SUM OF ALL RESPONSES TO PHQ9 QUESTIONS 1 & 2: 0
1. LITTLE INTEREST OR PLEASURE IN DOING THINGS: 0

## 2021-10-18 ASSESSMENT — LIFESTYLE VARIABLES: HOW OFTEN DO YOU HAVE A DRINK CONTAINING ALCOHOL: 0

## 2021-10-18 NOTE — PROGRESS NOTES
CC She is fu for her bp today and diabetes    meds refill for bp today    HPI 75 y/o female known htn, diabetes type 2 and needs meds refill today    Hypertension  No chest pain, headache, sob, leg edema, stroke, kidney disease     Diabetes type 2  No hypoglycemia  No polyuria, polydipsia, weight changes or blurred vision  Last AIC 6.4      Past Medical History:   Diagnosis Date    Diabetes mellitus ([address]     GERD (gastroesophageal reflux disease)     Hyperlipidemia     Hypertension     Obesity     S/P hip replacement     Sleep apnea    Physical Exam  Constitutional:       General: She is not in acute distress. Appearance: She is well-developed. She is not diaphoretic. HENT:      Head: Normocephalic and atraumatic. Right Ear: External ear normal.      Left Ear: External ear normal.      Nose: Nose normal.      Mouth/Throat:      Pharynx: No oropharyngeal exudate. Eyes:      General: No scleral icterus. Left eye: No discharge. Conjunctiva/sclera: Conjunctivae normal.      Pupils: Pupils are equal, round, and reactive to light. Neck:      Thyroid: No thyromegaly. Vascular: No JVD. Trachea: No tracheal deviation. Cardiovascular:      Rate and Rhythm: Normal rate and regular rhythm. Heart sounds: Normal heart sounds. No murmur heard. No friction rub. No gallop. Pulmonary:      Effort: Pulmonary effort is normal. No respiratory distress. Breath sounds: Normal breath sounds. No stridor. No wheezing or rales. Chest:      Chest wall: No tenderness. Abdominal:      General: Bowel sounds are normal. There is no distension. Palpations: Abdomen is soft. There is no mass. Tenderness: There is no abdominal tenderness. There is no guarding or rebound. Musculoskeletal:         General: No tenderness. Normal range of motion. Cervical back: Normal range of motion and neck supple. Lymphadenopathy:      Cervical: No cervical adenopathy.    Skin: General: Skin is warm and dry. Coloration: Skin is not pale. Findings: No erythema or rash. Neurological:      Mental Status: She is alert and oriented to person, place, and time. Cranial Nerves: No cranial nerve deficit. Coordination: Coordination normal.      Deep Tendon Reflexes: Reflexes are normal and symmetric. Reflexes normal.   Psychiatric:         Behavior: Behavior normal.         Thought Content: Thought content normal.         Judgment: Judgment normal.           Visual inspection:  Deformity/amputation: absent  Skin lesions/pre-ulcerative calluses: absent  Edema: right- negative, left- negative    Sensory exam:  Monofilament sensation: normal  (minimum of 5 random plantar locations tested, avoiding callused areas - > 1 area with absence of sensation is + for neuropathy)    Plus at least one of the following:  Pulses: normal,   Pinprick: Intact  Proprioception: Intact  Vibration (128 Hz): Intact    Lab Results   Component Value Date    CREATININE 1.0 10/06/2021    BUN 20 10/06/2021     10/06/2021    K 4.4 10/06/2021     10/06/2021    CO2 27 10/06/2021       1. Essential hypertension  bp at goal < 140/90  Recent renal  - cloNIDine (CATAPRES) 0.2 MG tablet; TAKE 1 TABLET BY MOUTH TWICE DAILY  Dispense: 180 tablet; Refill: 1    2. Controlled type 2 diabetes mellitus without complication, without long-term current use of insulin (HCC)  AIC 6.4  At goal  -  DIABETES FOOT EXAM    3. Need for shingles vaccine    - zoster recombinant adjuvanted vaccine [facility]) 50 MCG/0.5ML SUSR injection; Inject 0.5 mLs into the muscle once for 1 dose 2 doses  4-6 months  Dispense: 0.5 mL; Refill: 0      Medicare Annual Wellness Visit  Name: Dulce Maria Maldonado Date: 10/18/2021   MRN: [id number] Sex: Female   Age: 76 y.o.  Ethnicity: Non- / Non    : 1955 Race: Erin Moh / African American      Jory Annlisa is here for Cristina DE LEON    Screenings for behavioral, psychosocial and functional/safety risks, and cognitive dysfunction are all negative except as indicated below. These results, as well as other patient data from the [address] form, are documented in Flowsheets linked to this Encounter. Allergies   Allergen Reactions    Codeine Itching    Penicillins Itching       Prior to Visit Medications    Medication Sig Taking? Authorizing Provider   cloNIDine (CATAPRES) 0.2 MG tablet TAKE 1 TABLET BY MOUTH TWICE DAILY Yes Miguel Ángel Flower MD   zoster recombinant adjuvanted vaccine [facility]) 50 MCG/0.5ML SUSR injection Inject 0.5 mLs into the muscle once for 1 dose 2 doses  4-6 months Yes Miguel Ángel Flower MD   pantoprazole (PROTONIX) 40 MG tablet Take 1 tablet by mouth 2 times daily (before meals) Yes Manuel Chavarria MD   oxaprozin (DAYPRO) 600 MG tablet TAKE 1 TABLET BY MOUTH EVERY DAY Yes Miguel Ángel Flower MD   valsartan-hydroCHLOROthiazide (DIOVAN-HCT) 320-25 MG per tablet Take 1 tablet by mouth daily Yes Miguel Ángel Flower MD   metFORMIN (GLUCOPHAGE-XR) 500 MG extended release tablet Take 2 tablets twice a day Yes Miguel Ángel Flower MD   minocycline (MINOCIN;DYNACIN) 50 MG capsule Take 1 capsule by mouth 2 times daily Yes Ines Eduardo MD   glimepiride (AMARYL) 4 MG tablet TAKE 1 TABLET BY MOUTH EVERY MORNING Yes Miguel Ángel Flower MD   atorvastatin (LIPITOR) 20 MG tablet TAKE ONE TABLET BY MOUTH DAILY Yes Miguel Ángel Flower MD   amLODIPine (NORVASC) 5 MG tablet TAKE 1 TABLET BY MOUTH DAILY Yes Miguel Ángel Flower MD   albuterol sulfate HFA (PROVENTIL HFA) 108 (90 Base) MCG/ACT inhaler Inhale 2 puffs into the lungs every 6 hours as needed for Wheezing Yes Miguel Ángel Flower MD   pantoprazole (PROTONIX) 40 MG tablet TAKE 1 TABLET BY MOUTH DAILY Yes Miguel Ángel Flower MD   estradiol (ESTRING) 2 MG vaginal ring Place 2 mg vaginally Yes Historical Provider, MD   tacrolimus (PROTOPIC) 0.1 % ointment Apply topically to the face 2 times daily if needed for rosacea.  Yes Ines Eduardo MD clobetasol (TEMOVATE) 0.05 % ointment Apply to affected area on the left thigh twice daily for up to 2 weeks or until improved.  Yes Maddison Lazaro MD   mometasone (NASONEX) 50 MCG/ACT nasal spray SHAKE WELL AND USE 2 SPRAYS NASALLY DAILY Yes Sumanth Garcia MD   meloxicam (MOBIC) 15 MG tablet   Historical Provider, MD   blood glucose monitor strips Dispense glucose strips covered by insurance and compatible with her glucose monitor  AIC 8.2  Test glucose twice a day  Sumanth Garcia MD       Past Medical History:   Diagnosis Date    Diabetes mellitus [address] [address]     GERD (gastroesophageal reflux disease)     Hyperlipidemia     Hypertension     Obesity     S/P hip replacement     Sleep apnea        Past Surgical History:   Procedure Laterality Date    COLONOSCOPY  9/28/2021    COLONOSCOPY POLYPECTOMY SNARE/COLD BIOPSY performed by Lilli Vu MD at [address] Left Oct '13    UPPER GASTROINTESTINAL ENDOSCOPY N/A 9/28/2021    EGD BIOPSY performed by Lilli Vu MD at [address] N/A 10/13/2021    ESOPHAGOGASTRODUODENOSCOPY RADIOFREQUENCY ABLATION performed by Lilli Vu MD at [address] N/A 10/13/2021    EGD BIOPSY performed by Lilli Vu MD at [address] ENDOSCOPY       Family History   Problem Relation Age of Onset    Heart Disease Mother        CareTeam (Including outside providers/suppliers regularly involved in providing care):   Patient Care Team:  Sumanth Garcia MD as PCP - General (Family Medicine)  Sumanth Garcia MD as PCP - [facility] Empaneled Provider  Maddison Lazaro MD as Consulting Physician (Dermatology)  Sherron Alvarez III as Consulting Physician (Ophthalmology)    Wt Readings from Last 3 Encounters:   10/18/21 258 lb (117 kg)   10/13/21 270 lb (122.5 kg)   09/28/21 270 lb (122.5 kg)     Vitals:    10/18/21 0756 10/18/21 0803   BP: (!) 149/79 137/89   Pulse: 103 99 Temp: 97.2 °F (36.2 °C)    TempSrc: Temporal    Weight: 258 lb (117 kg)    Height: 5' 4\" (1.626 m)      Body mass index is 44.29 kg/m². Based upon direct observation of the patient, evaluation of cognition reveals failed clock draw, only uses digital time keepers  Good fund of knowledge  Knows date of month, yr, who president of us is. Does own shopping, housecleaning, paying bills  Former [place]   . Patient's complete Health Risk Assessment and screening values have been reviewed and are found in Flowsheets. The following problems were reviewed today and where indicated follow up appointments were made and/or referrals ordered.     Positive Risk Factor Screenings with Interventions:           Health Habits/Nutrition:  Health Habits/Nutrition  Do you exercise for at least 20 minutes 2-3 times per week?: (!) No  Have you lost any weight without trying in the past 3 months?: No  Do you eat only one meal per day?: No  Have you seen the dentist within the past year?: (!) No  Body mass index: (!) 44.28  Health Habits/Nutrition Interventions:  · Inadequate physical activity:  patient agrees to exercise for at least 150 minutes/week  · Nutritional issues:  patient is not ready to address his/her nutritional/weight issues at this time  · Dental exam overdue:  patient encouraged to make appointment with his/her dentist       Personalized Preventive Plan   Current Health Maintenance Status  Immunization History   Administered Date(s) Administered    COVID-19, Friend Peter, PF, 30mcg/0.3mL 04/01/2021, 05/01/2021    Influenza Vaccine, unspecified formulation 10/28/2016    Influenza, Intradermal, Preservative free 12/28/2012, 10/28/2016    Influenza, Quadv, IM, PF (6 mo and older Fluzone, Flulaval, Fluarix, and 3 yrs and older Afluria) 10/08/2019    Pneumococcal Polysaccharide ([id number]) 11/11/2016    Tdap (Boostrix, Adacel) 06/06/2016    Zoster Live (Zostavax) 09/27/2017        Health Maintenance   Topic Date Due    DEXA (modify frequency per FRAX score)  Never done    Lipid screen  05/27/2017    Diabetic retinal exam  11/01/2017    Diabetic microalbuminuria test  11/08/2017    Shingles Vaccine (2 of 3) 11/22/2017    COVID-19 Vaccine (3 - Pfizer risk 3-dose series) 05/29/2021    Annual Wellness Visit (AWV)  Never done    Flu vaccine (1) 09/01/2021    Pneumococcal 65+ years Vaccine (1 of 1 - PPSV23) 11/11/2021    A1C test (Diabetic or Prediabetic)  09/20/2022    Potassium monitoring  10/06/2022    Creatinine monitoring  10/06/2022    Diabetic foot exam  10/18/2022    Breast cancer screen  08/19/2023    DTaP/Tdap/Td vaccine (2 - Td or Tdap) 06/06/2026    Colon cancer screen colonoscopy  09/28/2031    Hepatitis C screen  Completed    Hepatitis A vaccine  Aged Out    Hib vaccine  Aged Out    Meningococcal (ACWY) vaccine  Aged Out     Recommendations for Noosh Due: see orders and patient instructions/AVS.  . Recommended screening schedule for the next 5-10 years is provided to the patient in written form: see Patient Instructions/AVS.    Magen Coffey was seen today for medicare awv.     Diagnoses and all orders for this visit:

## 2021-10-18 NOTE — PATIENT INSTRUCTIONS
Personalized Preventive Plan for Haleigh La - 10/18/2021  Medicare offers a range of preventive health benefits. Some of the tests and screenings are paid in full while other may be subject to a deductible, co-insurance, and/or copay. Some of these benefits include a comprehensive review of your medical history including lifestyle, illnesses that may run in your family, and various assessments and screenings as appropriate. After reviewing your medical record and screening and assessments performed today your provider may have ordered immunizations, labs, imaging, and/or referrals for you. A list of these orders (if applicable) as well as your Preventive Care list are included within your After Visit Summary for your review. Other Preventive Recommendations:    · A preventive eye exam performed by an eye specialist is recommended every 1-2 years to screen for glaucoma; cataracts, macular degeneration, and other eye disorders. · A preventive dental visit is recommended every 6 months. · Try to get at least 150 minutes of exercise per week or 10,000 steps per day on a pedometer . · Order or download the FREE \"Exercise & Physical Activity: Your Everyday Guide\" from [employer]. Call 4-836.824.9137 or search [employer] online. · You need 7213-6724 mg of calcium and 3000-7449 IU of vitamin D per day. It is possible to meet your calcium requirement with diet alone, but a vitamin D supplement is usually necessary to meet this goal.  · When exposed to the sun, use a sunscreen that protects against both UVA and UVB radiation with an SPF of 30 or greater. Reapply every 2 to 3 hours or after sweating, drying off with a towel, or swimming. · Always wear a seat belt when traveling in a car. Always wear a helmet when riding a bicycle or motorcycle.

## 2021-11-04 ENCOUNTER — HOSPITAL ENCOUNTER (OUTPATIENT)
Dept: WOMENS IMAGING | Age: 66
Discharge: HOME | End: 2021-11-04
Payer: MEDICARE

## 2021-11-04 DIAGNOSIS — Z78.0 POSTMENOPAUSAL: ICD-10-CM

## 2021-11-04 PROCEDURE — 77080 DXA BONE DENSITY AXIAL: CPT

## 2021-11-11 DIAGNOSIS — I10 ESSENTIAL HYPERTENSION: ICD-10-CM

## 2021-11-11 DIAGNOSIS — K21.9 GASTROESOPHAGEAL REFLUX DISEASE WITHOUT ESOPHAGITIS: ICD-10-CM

## 2021-11-11 RX ORDER — AMLODIPINE BESYLATE 5 MG/1
TABLET ORAL
Qty: 90 TABLET | Refills: 2 | Status: SHIPPED | OUTPATIENT
Start: 2021-11-11 | End: 2022-06-07

## 2021-11-11 RX ORDER — PANTOPRAZOLE SODIUM 40 MG/1
TABLET, DELAYED RELEASE ORAL
Qty: 90 TABLET | Refills: 2 | Status: SHIPPED | OUTPATIENT
Start: 2021-11-11

## 2021-11-11 NOTE — TELEPHONE ENCOUNTER
Medication:   Requested Prescriptions     Pending Prescriptions Disp Refills    pantoprazole (PROTONIX) 40 MG tablet [Pharmacy Med Name: PANTOPRAZOLE 40MG TABLETS] 90 tablet 2     Sig: TAKE 1 TABLET BY MOUTH DAILY    amLODIPine (NORVASC) 5 MG tablet [Pharmacy Med Name: AMLODIPINE BESYLATE 5MG TABLETS] 90 tablet 2     Sig: TAKE 1 TABLET BY MOUTH DAILY        Last Filled:      Patient Phone Number: [phone] (home)     Last appt: 10/18/2021   Next appt: Visit date not found    Last OARRS:   RX Monitoring 7/28/2015   Periodic Controlled Substance Monitoring No signs of potential drug abuse or diversion identified.

## 2021-11-12 ENCOUNTER — TELEPHONE (OUTPATIENT)
Dept: PRIMARY CARE CLINIC | Age: 66
End: 2021-11-12

## 2021-11-12 NOTE — TELEPHONE ENCOUNTER
Kasia Pride MD  [facility] [address]  Clinical Staff  Notify PT her dexascan is normal and she does not have osteoporosis or thinning of the bone   Called patient left message to call the office.

## 2021-11-19 ENCOUNTER — TELEPHONE (OUTPATIENT)
Dept: PRIMARY CARE CLINIC | Age: 66
End: 2021-11-19

## 2021-11-19 NOTE — TELEPHONE ENCOUNTER
Sumanth Garcia MD  [facility] Clinical Staff  Notify PT her dexascan is normal and she does not have osteoporosis or thinning of the bone   Spoke with patient informed dexascan normal.

## 2021-12-16 ENCOUNTER — NURSE ONLY (OUTPATIENT)
Dept: CARDIOLOGY CLINIC | Age: 66
End: 2021-12-16
Payer: MEDICARE

## 2021-12-16 DIAGNOSIS — I44.2 COMPLETE HEART BLOCK (HCC): ICD-10-CM

## 2021-12-16 DIAGNOSIS — Z95.0 PACEMAKER: ICD-10-CM

## 2021-12-17 DIAGNOSIS — E11.9 TYPE 2 DIABETES MELLITUS WITHOUT COMPLICATION, WITHOUT LONG-TERM CURRENT USE OF INSULIN (HCC): ICD-10-CM

## 2021-12-17 RX ORDER — METFORMIN HYDROCHLORIDE 500 MG/1
TABLET, EXTENDED RELEASE ORAL
Qty: 180 TABLET | Refills: 1 | Status: SHIPPED | OUTPATIENT
Start: 2021-12-17 | End: 2022-07-20

## 2021-12-17 NOTE — PROGRESS NOTES
We received remote transmission from patient's dual chamber pacemaker monitor at home. Transmission shows normal sensing and pacing function. No new arrhythmias/events recorded. Ap 9.7%   100% (MVP Off)  Echo 29.7811 showed EF of 60%. EP physician will review. See interrogation under cardiology tab in the [address] [place] [address] [address] field for more details. Will continue to monitor remotely.

## 2021-12-18 PROCEDURE — 93296 REM INTERROG EVL PM/IDS: CPT | Performed by: INTERNAL MEDICINE

## 2021-12-18 PROCEDURE — 93294 REM INTERROG EVL PM/LDLS PM: CPT | Performed by: INTERNAL MEDICINE

## 2022-01-05 DIAGNOSIS — E11.9 TYPE 2 DIABETES MELLITUS WITHOUT COMPLICATION, WITHOUT LONG-TERM CURRENT USE OF INSULIN (HCC): ICD-10-CM

## 2022-01-05 RX ORDER — DULAGLUTIDE 0.75 MG/.5ML
INJECTION, SOLUTION SUBCUTANEOUS
Qty: 2 ML | Refills: 5 | Status: SHIPPED | OUTPATIENT
Start: 2022-01-05 | End: 2022-08-09

## 2022-01-12 ENCOUNTER — TELEPHONE (OUTPATIENT)
Dept: PRIMARY CARE CLINIC | Age: 67
End: 2022-01-12

## 2022-01-12 DIAGNOSIS — E11.9 TYPE 2 DIABETES MELLITUS WITHOUT COMPLICATION, WITHOUT LONG-TERM CURRENT USE OF INSULIN (HCC): Primary | ICD-10-CM

## 2022-01-12 NOTE — TELEPHONE ENCOUNTER
MED REQUEST:  Pt states that previously her insurance would not cover the Invokamet 150-500 mg 1 tab BID. Her insurance will cover the medication now, so she wants to know if you will call in a new rx. thank you!   [facility]: [phone]   [facility]: [phone]

## 2022-01-27 DIAGNOSIS — E11.9 TYPE 2 DIABETES MELLITUS WITHOUT COMPLICATION, UNSPECIFIED WHETHER LONG TERM INSULIN USE (HCC): Primary | ICD-10-CM

## 2022-01-27 RX ORDER — CALCIUM CITRATE/VITAMIN D3 200MG-6.25
TABLET ORAL
Qty: 200 EACH | Refills: 5 | Status: SHIPPED | OUTPATIENT
Start: 2022-01-27 | End: 2022-01-29 | Stop reason: SDUPTHER

## 2022-01-29 DIAGNOSIS — E11.9 TYPE 2 DIABETES MELLITUS WITHOUT COMPLICATION, UNSPECIFIED WHETHER LONG TERM INSULIN USE (HCC): ICD-10-CM

## 2022-01-29 RX ORDER — CALCIUM CITRATE/VITAMIN D3 200MG-6.25
TABLET ORAL
Qty: 200 EACH | Refills: 5 | Status: SHIPPED | OUTPATIENT
Start: 2022-01-29

## 2022-02-14 RX ORDER — MINOCYCLINE HYDROCHLORIDE 50 MG/1
CAPSULE ORAL
Qty: 60 CAPSULE | Refills: 0 | OUTPATIENT
Start: 2022-02-14

## 2022-02-15 ENCOUNTER — OFFICE VISIT (OUTPATIENT)
Dept: DERMATOLOGY | Age: 67
End: 2022-02-15
Payer: MEDICARE

## 2022-02-15 VITALS — TEMPERATURE: 97.4 F

## 2022-02-15 DIAGNOSIS — L71.9 ROSACEA: Primary | ICD-10-CM

## 2022-02-15 PROBLEM — M05.9 RHEUMATOID ARTHRITIS WITH RHEUMATOID FACTOR, UNSPECIFIED (HCC): Status: ACTIVE | Noted: 2022-02-15

## 2022-02-15 PROBLEM — M06.9 RHEUMATOID ARTHRITIS, UNSPECIFIED (HCC): Status: ACTIVE | Noted: 2022-02-15

## 2022-02-15 PROCEDURE — G8417 CALC BMI ABV UP PARAM F/U: HCPCS | Performed by: DERMATOLOGY

## 2022-02-15 PROCEDURE — 1090F PRES/ABSN URINE INCON ASSESS: CPT | Performed by: DERMATOLOGY

## 2022-02-15 PROCEDURE — G8399 PT W/DXA RESULTS DOCUMENT: HCPCS | Performed by: DERMATOLOGY

## 2022-02-15 PROCEDURE — 1123F ACP DISCUSS/DSCN MKR DOCD: CPT | Performed by: DERMATOLOGY

## 2022-02-15 PROCEDURE — 1036F TOBACCO NON-USER: CPT | Performed by: DERMATOLOGY

## 2022-02-15 PROCEDURE — G8484 FLU IMMUNIZE NO ADMIN: HCPCS | Performed by: DERMATOLOGY

## 2022-02-15 PROCEDURE — 4040F PNEUMOC VAC/ADMIN/RCVD: CPT | Performed by: DERMATOLOGY

## 2022-02-15 PROCEDURE — 99213 OFFICE O/P EST LOW 20 MIN: CPT | Performed by: DERMATOLOGY

## 2022-02-15 PROCEDURE — G8427 DOCREV CUR MEDS BY ELIG CLIN: HCPCS | Performed by: DERMATOLOGY

## 2022-02-15 PROCEDURE — 3017F COLORECTAL CA SCREEN DOC REV: CPT | Performed by: DERMATOLOGY

## 2022-02-15 RX ORDER — MINOCYCLINE HYDROCHLORIDE 50 MG/1
CAPSULE ORAL
Qty: 60 CAPSULE | Refills: 0 | Status: CANCELLED | OUTPATIENT
Start: 2022-02-15

## 2022-02-15 RX ORDER — MINOCYCLINE HYDROCHLORIDE 50 MG/1
CAPSULE ORAL
Qty: 60 CAPSULE | Refills: 0 | Status: SHIPPED | OUTPATIENT
Start: 2022-02-15 | End: 2022-03-17

## 2022-02-15 RX ORDER — TACROLIMUS 1 MG/G
OINTMENT TOPICAL
Qty: 30 G | Refills: 0 | Status: CANCELLED | OUTPATIENT
Start: 2022-02-15

## 2022-02-15 NOTE — PROGRESS NOTES
[facility]  Le Sen MD  [address]  1955    78 y.o. female     Date of Visit: 2/15/2022    Chief Complaint: rosacea    History of Present Illness:    1. She returns today for a history of a papulopustular rosacea. Has not had any major flares. Has taken minocycline 50 mg twice daily as needed for recurrences. She has used tacrolimus 0.1% ointment in the past due to the pruritic nature of the eruption with good improvement. Has pacemaker. Review of Systems:  Gen: Feels well, good sense of health. Past Medical History, Family History, Surgical History, Medications and Allergies reviewed. Past Medical History:   Diagnosis Date    Diabetes mellitus ([address]     GERD (gastroesophageal reflux disease)     Hyperlipidemia     Hypertension     Obesity     S/P hip replacement     Sleep apnea      Past Surgical History:   Procedure Laterality Date    COLONOSCOPY  9/28/2021    COLONOSCOPY POLYPECTOMY SNARE/COLD BIOPSY performed by My Morrell MD at [address] Left Oct '13    UPPER GASTROINTESTINAL ENDOSCOPY N/A 9/28/2021    EGD BIOPSY performed by My Morrell MD at [facility] N/A 10/13/2021    ESOPHAGOGASTRODUODENOSCOPY RADIOFREQUENCY ABLATION performed by My Morrell MD at [facility] N/A 10/13/2021    EGD BIOPSY performed by My Morrell MD at [address] [place] [address]   Allergen Reactions    Codeine Itching    Penicillins Itching     Outpatient Medications Marked as Taking for the 2/15/22 encounter (Office Visit) with Sophie Mccollum MD   Medication Sig Dispense Refill    metroNIDAZOLE (METROCREAM) 0.75 % cream Apply to the face 1 to 2 times daily for rosacea.  45 g 2    minocycline (MINOCIN;DYNACIN) 50 MG capsule TAKE 1 CAPSULE BY MOUTH TWICE A DAY 60 capsule 0    blood glucose test strips (TRUE METRIX BLOOD GLUCOSE TEST) strip Test glucose  ONCE a day 200 each 5    canagliflozin-metFORMIN HCl (INVOKAMET) 150-500 MG Take 1 tablet by mouth 2 times daily (with meals) 60 tablet 3    Dulaglutide (TRULICITY) 4.09 BM/5.3VC SOPN ADMINISTER 0.5 ML UNDER THE SKIN 1 TIME WEEKLY 2 mL 5    metFORMIN (GLUCOPHAGE-XR) 500 MG extended release tablet TAKE 2 TABLETS BY MOUTH TWICE A  tablet 1    pantoprazole (PROTONIX) 40 MG tablet TAKE 1 TABLET BY MOUTH DAILY 90 tablet 2    amLODIPine (NORVASC) 5 MG tablet TAKE 1 TABLET BY MOUTH DAILY 90 tablet 2    cloNIDine (CATAPRES) 0.2 MG tablet TAKE 1 TABLET BY MOUTH TWICE DAILY 180 tablet 1    meloxicam (MOBIC) 15 MG tablet       oxaprozin (DAYPRO) 600 MG tablet TAKE 1 TABLET BY MOUTH EVERY DAY 60 tablet 5    valsartan-hydroCHLOROthiazide (DIOVAN-HCT) 320-25 MG per tablet Take 1 tablet by mouth daily 90 tablet 1    glimepiride (AMARYL) 4 MG tablet TAKE 1 TABLET BY MOUTH EVERY MORNING 90 tablet 1    atorvastatin (LIPITOR) 20 MG tablet TAKE ONE TABLET BY MOUTH DAILY 90 tablet 3    albuterol sulfate HFA (PROVENTIL HFA) 108 (90 Base) MCG/ACT inhaler Inhale 2 puffs into the lungs every 6 hours as needed for Wheezing 1 Inhaler 5    estradiol (ESTRING) 2 MG vaginal ring Place 2 mg vaginally      tacrolimus (PROTOPIC) 0.1 % ointment Apply topically to the face 2 times daily if needed for rosacea. 30 g 0    clobetasol (TEMOVATE) 0.05 % ointment Apply to affected area on the left thigh twice daily for up to 2 weeks or until improved. 45 g 2    mometasone (NASONEX) 50 MCG/ACT nasal spray SHAKE WELL AND USE 2 SPRAYS NASALLY DAILY 34 g 5           Physical Examination     Well appearing. Clear today. Assessment and Plan     1. Rosacea - papulopustular, doing well right now    Start metronidazole 0.75% cream 1 to 2 times daily. Minocycline 50 mg twice daily for a couple of weeks if needed for major flares.           Return in about 1 year (around 2/15/2023).     --Micki Campos MD

## 2022-03-16 NOTE — PROGRESS NOTES
We received remote transmission from patient's dual chamber pacemaker monitor at home. Transmission shows normal sensing and pacing function. AT w competitive Ap noted (3mins, <0.1% burden). Ap 9.9%   100% (MVP Off)  Echo 95.4294 showed EF of 60%. EP physician will review. See interrogation under cardiology tab in the [address] [place] [address] [address] field for more details. Will continue to monitor remotely.

## 2022-03-17 ENCOUNTER — NURSE ONLY (OUTPATIENT)
Dept: CARDIOLOGY CLINIC | Age: 67
End: 2022-03-17
Payer: MEDICARE

## 2022-03-17 DIAGNOSIS — Z95.0 PACEMAKER: ICD-10-CM

## 2022-03-17 DIAGNOSIS — I44.2 COMPLETE HEART BLOCK (HCC): ICD-10-CM

## 2022-03-17 RX ORDER — MINOCYCLINE HYDROCHLORIDE 50 MG/1
CAPSULE ORAL
Qty: 60 CAPSULE | Refills: 0 | Status: SHIPPED | OUTPATIENT
Start: 2022-03-17 | End: 2022-04-18

## 2022-03-28 PROCEDURE — 93296 REM INTERROG EVL PM/IDS: CPT | Performed by: INTERNAL MEDICINE

## 2022-03-28 PROCEDURE — 93294 REM INTERROG EVL PM/LDLS PM: CPT | Performed by: INTERNAL MEDICINE

## 2022-03-31 DIAGNOSIS — E11.9 TYPE 2 DIABETES MELLITUS WITHOUT COMPLICATION, WITHOUT LONG-TERM CURRENT USE OF INSULIN (HCC): ICD-10-CM

## 2022-03-31 RX ORDER — GLIMEPIRIDE 4 MG/1
TABLET ORAL
Qty: 90 TABLET | Refills: 2 | Status: SHIPPED | OUTPATIENT
Start: 2022-03-31 | End: 2022-08-09

## 2022-04-18 RX ORDER — MINOCYCLINE HYDROCHLORIDE 50 MG/1
CAPSULE ORAL
Qty: 60 CAPSULE | Refills: 0 | Status: SHIPPED | OUTPATIENT
Start: 2022-04-18 | End: 2022-05-24

## 2022-05-04 ENCOUNTER — APPOINTMENT (OUTPATIENT)
Dept: GENERAL RADIOLOGY | Age: 67
End: 2022-05-04
Payer: MEDICARE

## 2022-05-04 ENCOUNTER — HOSPITAL ENCOUNTER (EMERGENCY)
Age: 67
Discharge: HOME | End: 2022-05-04
Attending: EMERGENCY MEDICINE
Payer: MEDICARE

## 2022-05-04 VITALS
OXYGEN SATURATION: 99 % | WEIGHT: 249.7 LBS | SYSTOLIC BLOOD PRESSURE: 118 MMHG | BODY MASS INDEX: 44.24 KG/M2 | HEART RATE: 94 BPM | DIASTOLIC BLOOD PRESSURE: 76 MMHG | TEMPERATURE: 98.1 F | RESPIRATION RATE: 14 BRPM | HEIGHT: 63 IN

## 2022-05-04 DIAGNOSIS — S93.402A MODERATE LEFT ANKLE SPRAIN, INITIAL ENCOUNTER: Primary | ICD-10-CM

## 2022-05-04 PROCEDURE — 73610 X-RAY EXAM OF ANKLE: CPT

## 2022-05-04 PROCEDURE — 73630 X-RAY EXAM OF FOOT: CPT

## 2022-05-04 PROCEDURE — 99283 EMERGENCY DEPT VISIT LOW MDM: CPT

## 2022-05-04 PROCEDURE — 6370000000 HC RX 637 (ALT 250 FOR IP): Performed by: EMERGENCY MEDICINE

## 2022-05-04 RX ORDER — IBUPROFEN 800 MG/1
800 TABLET ORAL ONCE
Status: COMPLETED | OUTPATIENT
Start: 2022-05-04 | End: 2022-05-04

## 2022-05-04 RX ADMIN — IBUPROFEN 800 MG: 800 TABLET, FILM COATED ORAL at 17:19

## 2022-05-04 ASSESSMENT — PAIN DESCRIPTION - PAIN TYPE: TYPE: ACUTE PAIN

## 2022-05-04 ASSESSMENT — PAIN DESCRIPTION - ORIENTATION: ORIENTATION: LEFT

## 2022-05-04 ASSESSMENT — PAIN SCALES - GENERAL
PAINLEVEL_OUTOF10: 2
PAINLEVEL_OUTOF10: 2

## 2022-05-04 ASSESSMENT — PAIN DESCRIPTION - DESCRIPTORS: DESCRIPTORS: PATIENT UNABLE TO DESCRIBE

## 2022-05-04 ASSESSMENT — PAIN DESCRIPTION - LOCATION: LOCATION: ANKLE

## 2022-05-24 RX ORDER — MINOCYCLINE HYDROCHLORIDE 50 MG/1
CAPSULE ORAL
Qty: 60 CAPSULE | Refills: 0 | Status: SHIPPED | OUTPATIENT
Start: 2022-05-24

## 2022-06-06 DIAGNOSIS — I10 ESSENTIAL HYPERTENSION: ICD-10-CM

## 2022-06-07 RX ORDER — AMLODIPINE BESYLATE 5 MG/1
TABLET ORAL
Qty: 90 TABLET | Refills: 2 | Status: SHIPPED | OUTPATIENT
Start: 2022-06-07

## 2022-06-07 NOTE — TELEPHONE ENCOUNTER
Medication:   Requested Prescriptions     Pending Prescriptions Disp Refills    amLODIPine (NORVASC) 5 MG tablet [Pharmacy Med Name: AMLODIPINE BESYLATE 5 MG TAB] 90 tablet 2     Sig: TAKE 1 TABLET EVERY DAY        Last Filled:      Patient Phone Number: [phone] (home)     Last appt: 10/18/2021   Next appt: Visit date not found    Last OARRS:   RX Monitoring 7/28/2015   Periodic Controlled Substance Monitoring No signs of potential drug abuse or diversion identified.

## 2022-06-27 ENCOUNTER — NURSE ONLY (OUTPATIENT)
Dept: CARDIOLOGY CLINIC | Age: 67
End: 2022-06-27
Payer: MEDICARE

## 2022-06-27 DIAGNOSIS — I44.2 COMPLETE HEART BLOCK (HCC): ICD-10-CM

## 2022-06-27 DIAGNOSIS — Z95.0 PACEMAKER: ICD-10-CM

## 2022-06-27 PROCEDURE — 93294 REM INTERROG EVL PM/LDLS PM: CPT | Performed by: INTERNAL MEDICINE

## 2022-06-27 PROCEDURE — 93296 REM INTERROG EVL PM/IDS: CPT | Performed by: INTERNAL MEDICINE

## 2022-06-27 NOTE — PROGRESS NOTES
We received remote transmission from patient's dual chamber pacemaker monitor at home. Transmission shows normal sensing and pacing function. AT w competitive Ap noted (1 min, <0.1% burden). Ap 12.4%   100% (MVP Off)  Echo 50.8755 showed EF of 60%. EP physician will review. See interrogation under cardiology tab in the [address] [place] [address] [address] field for more details. Will continue to monitor remotely. (End of 91-day monitoring period 6/27/22).

## 2022-07-03 DIAGNOSIS — I10 ESSENTIAL HYPERTENSION: ICD-10-CM

## 2022-07-06 RX ORDER — ATORVASTATIN CALCIUM 20 MG/1
TABLET, FILM COATED ORAL
Qty: 30 TABLET | Refills: 5 | Status: SHIPPED | OUTPATIENT
Start: 2022-07-06 | End: 2022-07-09

## 2022-07-07 DIAGNOSIS — I10 ESSENTIAL HYPERTENSION: ICD-10-CM

## 2022-07-09 RX ORDER — ATORVASTATIN CALCIUM 20 MG/1
TABLET, FILM COATED ORAL
Qty: 30 TABLET | Refills: 6 | Status: SHIPPED | OUTPATIENT
Start: 2022-07-09

## 2022-07-19 DIAGNOSIS — E11.9 TYPE 2 DIABETES MELLITUS WITHOUT COMPLICATION, WITHOUT LONG-TERM CURRENT USE OF INSULIN (HCC): ICD-10-CM

## 2022-07-20 RX ORDER — METFORMIN HYDROCHLORIDE 500 MG/1
TABLET, EXTENDED RELEASE ORAL
Qty: 180 TABLET | Refills: 1 | Status: SHIPPED | OUTPATIENT
Start: 2022-07-20 | End: 2022-08-09 | Stop reason: SDUPTHER

## 2022-08-09 ENCOUNTER — OFFICE VISIT (OUTPATIENT)
Dept: PRIMARY CARE CLINIC | Age: 67
End: 2022-08-09
Payer: MEDICARE

## 2022-08-09 VITALS
DIASTOLIC BLOOD PRESSURE: 72 MMHG | HEART RATE: 67 BPM | OXYGEN SATURATION: 98 % | WEIGHT: 249.6 LBS | SYSTOLIC BLOOD PRESSURE: 110 MMHG | HEIGHT: 63 IN | BODY MASS INDEX: 44.23 KG/M2 | TEMPERATURE: 97 F

## 2022-08-09 DIAGNOSIS — Z23 NEED FOR SHINGLES VACCINE: ICD-10-CM

## 2022-08-09 DIAGNOSIS — E66.01 OBESITY, CLASS III, BMI 40-49.9 (MORBID OBESITY) (HCC): ICD-10-CM

## 2022-08-09 DIAGNOSIS — I20.9 ANGINA PECTORIS (HCC): ICD-10-CM

## 2022-08-09 DIAGNOSIS — E11.9 TYPE 2 DIABETES MELLITUS WITHOUT COMPLICATION, WITHOUT LONG-TERM CURRENT USE OF INSULIN (HCC): Primary | ICD-10-CM

## 2022-08-09 DIAGNOSIS — I44.2 COMPLETE HEART BLOCK (HCC): ICD-10-CM

## 2022-08-09 DIAGNOSIS — I10 ESSENTIAL HYPERTENSION: ICD-10-CM

## 2022-08-09 DIAGNOSIS — Z23 NEED FOR VACCINATION FOR PNEUMOCOCCUS: ICD-10-CM

## 2022-08-09 DIAGNOSIS — M05.9 RHEUMATOID ARTHRITIS WITH RHEUMATOID FACTOR, UNSPECIFIED (HCC): ICD-10-CM

## 2022-08-09 LAB — HBA1C MFR BLD: 8.5 %

## 2022-08-09 PROCEDURE — 99214 OFFICE O/P EST MOD 30 MIN: CPT | Performed by: FAMILY MEDICINE

## 2022-08-09 PROCEDURE — 1090F PRES/ABSN URINE INCON ASSESS: CPT | Performed by: FAMILY MEDICINE

## 2022-08-09 PROCEDURE — 1123F ACP DISCUSS/DSCN MKR DOCD: CPT | Performed by: FAMILY MEDICINE

## 2022-08-09 PROCEDURE — 3052F HG A1C>EQUAL 8.0%<EQUAL 9.0%: CPT | Performed by: FAMILY MEDICINE

## 2022-08-09 PROCEDURE — 3017F COLORECTAL CA SCREEN DOC REV: CPT | Performed by: FAMILY MEDICINE

## 2022-08-09 PROCEDURE — 1036F TOBACCO NON-USER: CPT | Performed by: FAMILY MEDICINE

## 2022-08-09 PROCEDURE — G8417 CALC BMI ABV UP PARAM F/U: HCPCS | Performed by: FAMILY MEDICINE

## 2022-08-09 PROCEDURE — G8427 DOCREV CUR MEDS BY ELIG CLIN: HCPCS | Performed by: FAMILY MEDICINE

## 2022-08-09 PROCEDURE — 2022F DILAT RTA XM EVC RTNOPTHY: CPT | Performed by: FAMILY MEDICINE

## 2022-08-09 PROCEDURE — 83036 HEMOGLOBIN GLYCOSYLATED A1C: CPT | Performed by: FAMILY MEDICINE

## 2022-08-09 PROCEDURE — G8399 PT W/DXA RESULTS DOCUMENT: HCPCS | Performed by: FAMILY MEDICINE

## 2022-08-09 RX ORDER — METFORMIN HYDROCHLORIDE 500 MG/1
TABLET, EXTENDED RELEASE ORAL
Qty: 180 TABLET | Refills: 1 | Status: SHIPPED | OUTPATIENT
Start: 2022-08-09

## 2022-08-09 RX ORDER — LIRAGLUTIDE 6 MG/ML
INJECTION SUBCUTANEOUS
Qty: 2 PEN | Refills: 3 | Status: SHIPPED | OUTPATIENT
Start: 2022-08-09 | End: 2022-09-25

## 2022-08-09 RX ORDER — VALSARTAN AND HYDROCHLOROTHIAZIDE 320; 25 MG/1; MG/1
1 TABLET, FILM COATED ORAL DAILY
Qty: 90 TABLET | Refills: 1 | Status: SHIPPED | OUTPATIENT
Start: 2022-08-09

## 2022-08-09 RX ORDER — CLONIDINE HYDROCHLORIDE 0.2 MG/1
TABLET ORAL
Qty: 180 TABLET | Refills: 1 | Status: SHIPPED | OUTPATIENT
Start: 2022-08-09

## 2022-08-09 RX ORDER — GLIMEPIRIDE 4 MG/1
TABLET ORAL
Qty: 90 TABLET | Refills: 2 | Status: SHIPPED | OUTPATIENT
Start: 2022-08-09

## 2022-08-09 RX ORDER — ZOSTER VACCINE RECOMBINANT, ADJUVANTED 50 MCG/0.5
0.5 KIT INTRAMUSCULAR ONCE
Qty: 0.5 ML | Refills: 0 | Status: SHIPPED | OUTPATIENT
Start: 2022-08-09 | End: 2022-08-09

## 2022-08-09 RX ORDER — PNEUMOCOCCAL VACCINE POLYVALENT 25; 25; 25; 25; 25; 25; 25; 25; 25; 25; 25; 25; 25; 25; 25; 25; 25; 25; 25; 25; 25; 25; 25 UG/.5ML; UG/.5ML; UG/.5ML; UG/.5ML; UG/.5ML; UG/.5ML; UG/.5ML; UG/.5ML; UG/.5ML; UG/.5ML; UG/.5ML; UG/.5ML; UG/.5ML; UG/.5ML; UG/.5ML; UG/.5ML; UG/.5ML; UG/.5ML; UG/.5ML; UG/.5ML; UG/.5ML; UG/.5ML; UG/.5ML
0.5 INJECTION, SOLUTION INTRAMUSCULAR; SUBCUTANEOUS ONCE
Qty: 0.5 ML | Refills: 0 | Status: SHIPPED | OUTPATIENT
Start: 2022-08-09 | End: 2022-08-09

## 2022-08-09 SDOH — ECONOMIC STABILITY: FOOD INSECURITY: WITHIN THE PAST 12 MONTHS, YOU WORRIED THAT YOUR FOOD WOULD RUN OUT BEFORE YOU GOT MONEY TO BUY MORE.: NEVER TRUE

## 2022-08-09 SDOH — ECONOMIC STABILITY: FOOD INSECURITY: WITHIN THE PAST 12 MONTHS, THE FOOD YOU BOUGHT JUST DIDN'T LAST AND YOU DIDN'T HAVE MONEY TO GET MORE.: NEVER TRUE

## 2022-08-09 ASSESSMENT — SOCIAL DETERMINANTS OF HEALTH (SDOH): HOW HARD IS IT FOR YOU TO PAY FOR THE VERY BASICS LIKE FOOD, HOUSING, MEDICAL CARE, AND HEATING?: NOT VERY HARD

## 2022-08-09 ASSESSMENT — PATIENT HEALTH QUESTIONNAIRE - PHQ9
SUM OF ALL RESPONSES TO PHQ QUESTIONS 1-9: 0
2. FEELING DOWN, DEPRESSED OR HOPELESS: 0
SUM OF ALL RESPONSES TO PHQ QUESTIONS 1-9: 0
SUM OF ALL RESPONSES TO PHQ9 QUESTIONS 1 & 2: 0
SUM OF ALL RESPONSES TO PHQ QUESTIONS 1-9: 0
SUM OF ALL RESPONSES TO PHQ QUESTIONS 1-9: 0
1. LITTLE INTEREST OR PLEASURE IN DOING THINGS: 0

## 2022-08-09 NOTE — PROGRESS NOTES
CC routine follow up visit today    HPI  78 y.o. female PMH HTN DM OBESE  Fu visit today    Hypertension  No chest pain, headache, sob, leg edema, stroke, kidney disease       Diabetes type 2  AM around 160's range  No hypoglycemia  INS not cover Invokana or Trulicity  On amaryl and metformin    Complete HB with pacemaker  Placement  Has fu Dr Arleen ÁLVAREZ  No significant card artery disease  RA   No current joint pain  Fu with Dr Ciro Pagan    Angina pectoris  No current chest pain  Today. She follows with Dr Jose Rafael Joseph      Physical Exam  Constitutional:       General: She is not in acute distress. Appearance: She is well-developed. She is not diaphoretic. HENT:      Head: Normocephalic and atraumatic. Right Ear: External ear normal.      Left Ear: External ear normal.      Nose: Nose normal.      Mouth/Throat:      Pharynx: No oropharyngeal exudate. Eyes:      General: No scleral icterus. Left eye: No discharge. Conjunctiva/sclera: Conjunctivae normal.      Pupils: Pupils are equal, round, and reactive to light. Neck:      Thyroid: No thyromegaly. Vascular: No JVD. Trachea: No tracheal deviation. Cardiovascular:      Rate and Rhythm: Normal rate and regular rhythm. Heart sounds: Normal heart sounds. No murmur heard. No friction rub. No gallop. Pulmonary:      Effort: Pulmonary effort is normal. No respiratory distress. Breath sounds: Normal breath sounds. No stridor. No wheezing or rales. Chest:      Chest wall: No tenderness. Abdominal:      General: Bowel sounds are normal. There is no distension. Palpations: Abdomen is soft. There is no mass. Tenderness: There is no abdominal tenderness. There is no guarding or rebound. Musculoskeletal:         General: No tenderness. Normal range of motion. Cervical back: Normal range of motion and neck supple. Lymphadenopathy:      Cervical: No cervical adenopathy.    Skin:     General: Skin is valsartan-hydroCHLOROthiazide (DIOVAN-HCT) 320-25 MG per tablet; Take 1 tablet by mouth in the morning.

## 2022-08-10 NOTE — PROGRESS NOTES
[address]   Electrophysiology      Date: 8/11/2022    Primary Cardiologist: Patrick Mcgee MD  PCP: Janay Minaya MD     Chief Complaint:   Chief Complaint   Patient presents with    Annual Exam     Sometimes I get SOB on exertion  Not all the time, I think it might be anxiety. History of Present Illness:    I saw Matilde Cain in the office for electrophysiology follow up today. She is a 78 y.o. female with a past medical history of diabetes, HTN and non-reversible bradycardia secondary to sinus rhythm with complete heart block and junctional escape rhythm with v-rates 30-40 bpm with symptoms of chest pain, dizziness. She underwent implantation of Medtronic dual chamber pacemaker on 4/27/2021 with Dr. Luli Griffin. She presents today for yearly follow up. She has been feeling fairly well since her last visit. She does have dyspnea on exertion at times which has been stable. Denies any palpitations or chest pain. No syncope. No edema. Allergies: Allergies   Allergen Reactions    Codeine Itching    Penicillins Itching     Home Medications:  Prior to Visit Medications    Medication Sig Taking? Authorizing Provider   glimepiride (AMARYL) 4 MG tablet TAKE 1 TABLET EVERY MORNING Yes Ewa Menezes MD   metFORMIN (GLUCOPHAGE-XR) 500 MG extended release tablet TAKE 2 TABLETS BY MOUTH TWICE A DAY Yes Ewa Menezes MD   Liraglutide (VICTOZA) 18 MG/3ML SOPN SC injection 0.6 mg under skin once a day for first week, then 1.2 mg under skin each day Yes Ewa Menezes MD   valsartan-hydroCHLOROthiazide (DIOVAN-HCT) 320-25 MG per tablet Take 1 tablet by mouth in the morning. Yes Ewa Menezes MD   cloNIDine (CATAPRES) 0.2 MG tablet TAKE 1 TABLET BY MOUTH TWICE DAILY Yes Ewa Menezes MD   empagliflozin (JARDIANCE) 10 MG tablet Take 1 tablet by mouth in the morning.  Yes Ewa Menezes MD   atorvastatin (LIPITOR) 20 MG tablet TAKE 1 TABLET AT BEDTIME Yes Ewa Menezes MD   amLODIPine (NORVASC) 5 MG tablet TAKE 1 Neida Curling Yes Emely Devine MD   metroNIDAZOLE (METROCREAM) 0.75 % cream APPLY TO THE FACE 1 TO 2 TIMES DAILY FOR ROSACEA. Yes Joan Meraz MD   minocycline (MINOCIN;DYNACIN) 50 MG capsule TAKE 1 CAPSULE BY MOUTH TWICE A DAY Yes Joan Meraz MD   blood glucose test strips (TRUE METRIX BLOOD GLUCOSE TEST) strip Test glucose  ONCE a day Yes Emely Devine MD   pantoprazole (PROTONIX) 40 MG tablet TAKE 1 TABLET BY MOUTH DAILY Yes Emely Devine MD   oxaprozin (DAYPRO) 600 MG tablet TAKE 1 TABLET BY MOUTH EVERY DAY Yes Emely Devine MD   albuterol sulfate HFA (PROVENTIL HFA) 108 (90 Base) MCG/ACT inhaler Inhale 2 puffs into the lungs every 6 hours as needed for Wheezing Yes Emely Devine MD   estradiol (ESTRING) 2 MG vaginal ring Place 2 mg vaginally Yes Nba Jesus MD   tacrolimus (PROTOPIC) 0.1 % ointment Apply topically to the face 2 times daily if needed for rosacea. Yes Joan Meraz MD   clobetasol (TEMOVATE) 0.05 % ointment Apply to affected area on the left thigh twice daily for up to 2 weeks or until improved.  Yes Joan Meraz MD   mometasone (NASONEX) 50 MCG/ACT nasal spray SHAKE WELL AND USE 2 SPRAYS NASALLY DAILY Yes Emely Devine MD        Past Medical History:  Past Medical History:   Diagnosis Date    Diabetes mellitus ([facility] [address]     GERD (gastroesophageal reflux disease)     Hyperlipidemia     Hypertension     Obesity     S/P hip replacement     Sleep apnea        Past Surgical History:   Past Surgical History:   Procedure Laterality Date    COLONOSCOPY  9/28/2021    COLONOSCOPY POLYPECTOMY SNARE/COLD BIOPSY performed by Izabella Carranza MD at [address] [place] [address] Oct '13    UPPER GASTROINTESTINAL ENDOSCOPY N/A 9/28/2021    EGD BIOPSY performed by Izabella Carranza MD at [address] [place] [address] N/A 10/13/2021    ESOPHAGOGASTRODUODENOSCOPY RADIOFREQUENCY ABLATION performed by Izabella Carranza MD at [place] [address] UPPER GASTROINTESTINAL ENDOSCOPY N/A 10/13/2021    EGD BIOPSY performed by Supriya Ruiz MD at [address] History:   reports that she has never smoked. She has never used smokeless tobacco. She reports that she does not drink alcohol and does not use drugs. Family History:      Problem Relation Age of Onset    Heart Disease Mother        Review of Systems   Constitutional:  Negative for chills, fatigue, fever and unexpected weight change. HENT:  Negative for congestion, hearing loss, sinus pressure, sore throat and trouble swallowing. Respiratory:  Positive for shortness of breath (BERGMAN). Negative for cough and wheezing. Cardiovascular:  Negative for chest pain, palpitations and leg swelling. Gastrointestinal:  Negative for abdominal pain, blood in stool, constipation, diarrhea, nausea and vomiting. Genitourinary:  Negative for hematuria. Musculoskeletal:  Negative for arthralgias, back pain, gait problem and myalgias. Skin:  Negative for color change, rash and wound. Neurological:  Negative for dizziness, seizures, syncope, speech difficulty, weakness and light-headedness. Hematological:  Does not bruise/bleed easily. Physical Examination:  Vitals:    08/11/22 1441   BP: 118/74   Pulse: 74   SpO2: 96%      Wt Readings from Last 3 Encounters:   08/11/22 253 lb (114.8 kg)   08/09/22 249 lb 9.6 oz (113.2 kg)   05/04/22 249 lb 11.2 oz (113.3 kg)       Physical Exam  Vitals reviewed. Constitutional:       General: She is not in acute distress. Appearance: Normal appearance. HENT:      Head: Normocephalic and atraumatic. Nose: Nose normal.      Mouth/Throat:      Mouth: Mucous membranes are moist.   Eyes:      Conjunctiva/sclera: Conjunctivae normal.      Pupils: Pupils are equal, round, and reactive to light. Cardiovascular:      Rate and Rhythm: Normal rate. Heart sounds: No murmur heard. No friction rub. No gallop.    Pulmonary:      Effort: No noted.   Normal diastolic function. The left atrium is dilated. The right ventricle is dilated. Right ventricular systolic function is normal.     Stress: 2/5/18  Abnormal study. There is a large sized, moderate intensity, reversible    defect of the basal to mid anterolateral, mid inferolateral, and apical    lateral walls which is consistent with ischemia. There is breast attenuation but stress images appear worse. Normal LV size and systolic function. Uncontrolled hypertension. Overall findings represent a high risk study. [facility]: 4/26/21  ANGIOGRAPHY FINDINGS:  1. Normal left main coronary artery. 2.  Normal left anterior descending artery. 3.  Normal left circumflex artery. 4.  Normal right coronary artery. 5.  LV ejection fraction 60%. SUMMARY:  Normal coronary arteries      EP Procedures:  1. Medtronic dual chamber PPM, 4/27/21, Dr. Marifer Sexton interrogation: 8/11/2022   Normal device function. Battery life 11.1 years  A paced 17.6%  V paced 100%  One episode of likely atrial fibrillation on 7/10/22 for 36 seconds. Assessment & Plan:    Complete heart block              - noted on EKG from 4/23/21              - symptomatic with CP, BERGMAN, fatigue and lightheaded              - s/p Medtronic dual chamber PPM implanted 4/27/21 with Dr. Yen Guallpa   - device interrogation as above, device with normal function   - continue with routine follow up with device clinic    Paroxysmal atrial fibrillation   - seen on device interrogation from 7/10/22, duration was only 36 seconds   - discussed the progressive nature of atrial fibrillation, will monitor for now and if has increasing burden, will consider further treatment including anticoagulation     Primary HTN              - controlled   - managed by PCP    Thank you for allowing to us to participate in the care of Jory Weller. Return in about 1 year (around 8/11/2023) for an appointment with Nguyen Julien NP.      Nguyen Julien, APRN  The [address] [place] [address], [address] [place] [address]  Phone: [phone]  Fax: [phone]    Electronically signed by JANIA Castillo CNP on 8/11/2022 at 4:09 PM

## 2022-08-11 ENCOUNTER — NURSE ONLY (OUTPATIENT)
Dept: CARDIOLOGY CLINIC | Age: 67
End: 2022-08-11

## 2022-08-11 ENCOUNTER — OFFICE VISIT (OUTPATIENT)
Dept: CARDIOLOGY CLINIC | Age: 67
End: 2022-08-11
Payer: MEDICARE

## 2022-08-11 VITALS
DIASTOLIC BLOOD PRESSURE: 74 MMHG | WEIGHT: 253 LBS | OXYGEN SATURATION: 96 % | HEIGHT: 63 IN | SYSTOLIC BLOOD PRESSURE: 118 MMHG | HEART RATE: 74 BPM | BODY MASS INDEX: 44.83 KG/M2

## 2022-08-11 DIAGNOSIS — Z95.0 PACEMAKER: ICD-10-CM

## 2022-08-11 DIAGNOSIS — I10 PRIMARY HYPERTENSION: ICD-10-CM

## 2022-08-11 DIAGNOSIS — I44.2 COMPLETE HEART BLOCK (HCC): Primary | ICD-10-CM

## 2022-08-11 PROCEDURE — G8417 CALC BMI ABV UP PARAM F/U: HCPCS | Performed by: NURSE PRACTITIONER

## 2022-08-11 PROCEDURE — 99214 OFFICE O/P EST MOD 30 MIN: CPT | Performed by: NURSE PRACTITIONER

## 2022-08-11 PROCEDURE — G8399 PT W/DXA RESULTS DOCUMENT: HCPCS | Performed by: NURSE PRACTITIONER

## 2022-08-11 PROCEDURE — G8427 DOCREV CUR MEDS BY ELIG CLIN: HCPCS | Performed by: NURSE PRACTITIONER

## 2022-08-11 PROCEDURE — 1036F TOBACCO NON-USER: CPT | Performed by: NURSE PRACTITIONER

## 2022-08-11 PROCEDURE — 1123F ACP DISCUSS/DSCN MKR DOCD: CPT | Performed by: NURSE PRACTITIONER

## 2022-08-11 PROCEDURE — 1090F PRES/ABSN URINE INCON ASSESS: CPT | Performed by: NURSE PRACTITIONER

## 2022-08-11 PROCEDURE — 3017F COLORECTAL CA SCREEN DOC REV: CPT | Performed by: NURSE PRACTITIONER

## 2022-08-11 PROCEDURE — 93000 ELECTROCARDIOGRAM COMPLETE: CPT | Performed by: NURSE PRACTITIONER

## 2022-08-11 ASSESSMENT — ENCOUNTER SYMPTOMS
COLOR CHANGE: 0
BACK PAIN: 0
VOMITING: 0
SORE THROAT: 0
ABDOMINAL PAIN: 0
TROUBLE SWALLOWING: 0
DIARRHEA: 0
SINUS PRESSURE: 0
WHEEZING: 0
SHORTNESS OF BREATH: 1
CONSTIPATION: 0
NAUSEA: 0
COUGH: 0
BLOOD IN STOOL: 0

## 2022-08-11 NOTE — PROGRESS NOTES
[address] transmission received 08.11.22 @ 2:40pm from [facility] for patient's dual chamber pacemaker. Pt seeing NPCP today. REASON FOR TRANSMISSION  Presence of cardiac device  TECHNICAL FINDINGS  No device or lead performance issue(s) observed  ADDITIONAL NOTES  Dual Chamber Pacemaker Transmission Reason: no call account   DEVICE ASSESSMENT: Available daily battery/lead measurements within expected range. Lead trends stable. ARRHYTHMIA SUMMARY: Based on programmed zones, device detected/treated: 1 AT/AF episode on 10 Jul 2022 See attached episode list & stored EGMs for details. OBSERVATIONS : Device appears to be currently functioning as programmed. NPCP will review. See interrogation under cardiology tab in the [address] [place] [address] [address] field for more details.

## 2022-08-16 ENCOUNTER — TELEPHONE (OUTPATIENT)
Dept: PRIMARY CARE CLINIC | Age: 67
End: 2022-08-16

## 2022-08-16 NOTE — TELEPHONE ENCOUNTER
Pt called stating that she was prescribe 2 new medications for her diabetes. One is victoza and she don't remember the name of the other. Pt states that her copay for the victoza will be $800 and the other one will be $300. Pt states that she can't afford that and she will like something different sent in a substitute. Pt will like a call back.  Please advise

## 2022-09-25 ENCOUNTER — APPOINTMENT (OUTPATIENT)
Dept: CT IMAGING | Age: 67
End: 2022-09-25
Payer: MEDICARE

## 2022-09-25 ENCOUNTER — HOSPITAL ENCOUNTER (EMERGENCY)
Age: 67
Discharge: HOME | End: 2022-09-25
Attending: EMERGENCY MEDICINE
Payer: MEDICARE

## 2022-09-25 VITALS
RESPIRATION RATE: 20 BRPM | WEIGHT: 249.25 LBS | HEIGHT: 62 IN | HEART RATE: 68 BPM | SYSTOLIC BLOOD PRESSURE: 123 MMHG | TEMPERATURE: 98.6 F | BODY MASS INDEX: 45.87 KG/M2 | DIASTOLIC BLOOD PRESSURE: 74 MMHG | OXYGEN SATURATION: 94 %

## 2022-09-25 DIAGNOSIS — K57.32 DIVERTICULITIS OF COLON: Primary | ICD-10-CM

## 2022-09-25 LAB
A/G RATIO: 1.3 (ref 1.1–2.2)
ALBUMIN SERPL-MCNC: 4.2 G/DL (ref 3.4–5)
ALP BLD-CCNC: 90 U/L (ref 40–129)
ALT SERPL-CCNC: 14 U/L (ref 10–40)
ANION GAP SERPL CALCULATED.3IONS-SCNC: 11 MMOL/L (ref 3–16)
AST SERPL-CCNC: 18 U/L (ref 15–37)
BACTERIA: ABNORMAL /HPF
BASOPHILS ABSOLUTE: 0.1 K/UL (ref 0–0.2)
BASOPHILS RELATIVE PERCENT: 1.3 %
BILIRUB SERPL-MCNC: 0.8 MG/DL (ref 0–1)
BILIRUBIN URINE: NEGATIVE
BLOOD, URINE: NEGATIVE
BUN BLDV-MCNC: 22 MG/DL (ref 7–20)
CALCIUM SERPL-MCNC: 9.6 MG/DL (ref 8.3–10.6)
CHLORIDE BLD-SCNC: 101 MMOL/L (ref 99–110)
CLARITY: CLEAR
CO2: 28 MMOL/L (ref 21–32)
COLOR: YELLOW
CREAT SERPL-MCNC: 0.9 MG/DL (ref 0.6–1.2)
EOSINOPHILS ABSOLUTE: 0.3 K/UL (ref 0–0.6)
EOSINOPHILS RELATIVE PERCENT: 3.9 %
EPITHELIAL CELLS, UA: ABNORMAL /HPF (ref 0–5)
GFR AFRICAN AMERICAN: >60
GFR NON-AFRICAN AMERICAN: >60
GLUCOSE BLD-MCNC: 168 MG/DL (ref 70–99)
GLUCOSE URINE: NEGATIVE MG/DL
HCT VFR BLD CALC: 35.1 % (ref 36–48)
HEMOGLOBIN: 11.6 G/DL (ref 12–16)
KETONES, URINE: NEGATIVE MG/DL
LEUKOCYTE ESTERASE, URINE: ABNORMAL
LIPASE: 18 U/L (ref 13–60)
LYMPHOCYTES ABSOLUTE: 1.2 K/UL (ref 1–5.1)
LYMPHOCYTES RELATIVE PERCENT: 15.7 %
MCH RBC QN AUTO: 28.3 PG (ref 26–34)
MCHC RBC AUTO-ENTMCNC: 33 G/DL (ref 31–36)
MCV RBC AUTO: 85.7 FL (ref 80–100)
MICROSCOPIC EXAMINATION: YES
MONOCYTES ABSOLUTE: 0.7 K/UL (ref 0–1.3)
MONOCYTES RELATIVE PERCENT: 9.1 %
NEUTROPHILS ABSOLUTE: 5.3 K/UL (ref 1.7–7.7)
NEUTROPHILS RELATIVE PERCENT: 70 %
NITRITE, URINE: NEGATIVE
PDW BLD-RTO: 15 % (ref 12.4–15.4)
PH UA: 6.5 (ref 5–8)
PLATELET # BLD: 243 K/UL (ref 135–450)
PMV BLD AUTO: 9.4 FL (ref 5–10.5)
POTASSIUM REFLEX MAGNESIUM: 4.3 MMOL/L (ref 3.5–5.1)
PROTEIN UA: NEGATIVE MG/DL
RBC # BLD: 4.09 M/UL (ref 4–5.2)
RBC UA: ABNORMAL /HPF (ref 0–4)
SODIUM BLD-SCNC: 140 MMOL/L (ref 136–145)
SPECIFIC GRAVITY UA: 1.01 (ref 1–1.03)
TOTAL PROTEIN: 7.5 G/DL (ref 6.4–8.2)
URINE REFLEX TO CULTURE: ABNORMAL
URINE TYPE: ABNORMAL
UROBILINOGEN, URINE: 0.2 E.U./DL
WBC # BLD: 7.5 K/UL (ref 4–11)
WBC UA: ABNORMAL /HPF (ref 0–5)

## 2022-09-25 PROCEDURE — 81001 URINALYSIS AUTO W/SCOPE: CPT

## 2022-09-25 PROCEDURE — 6370000000 HC RX 637 (ALT 250 FOR IP): Performed by: EMERGENCY MEDICINE

## 2022-09-25 PROCEDURE — 85025 COMPLETE CBC W/AUTO DIFF WBC: CPT

## 2022-09-25 PROCEDURE — 96374 THER/PROPH/DIAG INJ IV PUSH: CPT

## 2022-09-25 PROCEDURE — 74177 CT ABD & PELVIS W/CONTRAST: CPT

## 2022-09-25 PROCEDURE — 99285 EMERGENCY DEPT VISIT HI MDM: CPT

## 2022-09-25 PROCEDURE — 6360000004 HC RX CONTRAST MEDICATION: Performed by: EMERGENCY MEDICINE

## 2022-09-25 PROCEDURE — 6360000002 HC RX W HCPCS: Performed by: EMERGENCY MEDICINE

## 2022-09-25 PROCEDURE — 83690 ASSAY OF LIPASE: CPT

## 2022-09-25 PROCEDURE — 80053 COMPREHEN METABOLIC PANEL: CPT

## 2022-09-25 PROCEDURE — 96375 TX/PRO/DX INJ NEW DRUG ADDON: CPT

## 2022-09-25 RX ORDER — CIPROFLOXACIN 500 MG/1
500 TABLET, FILM COATED ORAL 2 TIMES DAILY
Qty: 20 TABLET | Refills: 0 | Status: SHIPPED | OUTPATIENT
Start: 2022-09-25 | End: 2022-10-05

## 2022-09-25 RX ORDER — METRONIDAZOLE 500 MG/1
500 TABLET ORAL 2 TIMES DAILY
Qty: 20 TABLET | Refills: 0 | Status: SHIPPED | OUTPATIENT
Start: 2022-09-25 | End: 2022-10-05

## 2022-09-25 RX ORDER — 0.9 % SODIUM CHLORIDE 0.9 %
1000 INTRAVENOUS SOLUTION INTRAVENOUS ONCE
Status: DISCONTINUED | OUTPATIENT
Start: 2022-09-25 | End: 2022-09-25 | Stop reason: HOSPADM

## 2022-09-25 RX ORDER — MORPHINE SULFATE 4 MG/ML
4 INJECTION, SOLUTION INTRAMUSCULAR; INTRAVENOUS ONCE
Status: COMPLETED | OUTPATIENT
Start: 2022-09-25 | End: 2022-09-25

## 2022-09-25 RX ORDER — ONDANSETRON 4 MG/1
4 TABLET, FILM COATED ORAL 3 TIMES DAILY PRN
Qty: 15 TABLET | Refills: 0 | Status: SHIPPED | OUTPATIENT
Start: 2022-09-25

## 2022-09-25 RX ORDER — ONDANSETRON 2 MG/ML
4 INJECTION INTRAMUSCULAR; INTRAVENOUS ONCE
Status: COMPLETED | OUTPATIENT
Start: 2022-09-25 | End: 2022-09-25

## 2022-09-25 RX ORDER — CIPROFLOXACIN 500 MG/1
500 TABLET, FILM COATED ORAL ONCE
Status: COMPLETED | OUTPATIENT
Start: 2022-09-25 | End: 2022-09-25

## 2022-09-25 RX ORDER — METRONIDAZOLE 500 MG/1
500 TABLET ORAL EVERY 8 HOURS SCHEDULED
Status: DISCONTINUED | OUTPATIENT
Start: 2022-09-25 | End: 2022-09-25 | Stop reason: HOSPADM

## 2022-09-25 RX ADMIN — METRONIDAZOLE 500 MG: 500 TABLET ORAL at 17:06

## 2022-09-25 RX ADMIN — Medication 1000 ML: at 15:28

## 2022-09-25 RX ADMIN — MORPHINE SULFATE 4 MG: 4 INJECTION, SOLUTION INTRAMUSCULAR; INTRAVENOUS at 15:30

## 2022-09-25 RX ADMIN — CIPROFLOXACIN 500 MG: 500 TABLET, FILM COATED ORAL at 17:07

## 2022-09-25 RX ADMIN — ONDANSETRON 4 MG: 2 INJECTION INTRAMUSCULAR; INTRAVENOUS at 15:26

## 2022-09-25 RX ADMIN — IOPAMIDOL 75 ML: 755 INJECTION, SOLUTION INTRAVENOUS at 16:00

## 2022-09-25 ASSESSMENT — PAIN DESCRIPTION - LOCATION: LOCATION: ABDOMEN

## 2022-09-25 ASSESSMENT — PAIN DESCRIPTION - ORIENTATION: ORIENTATION: LEFT

## 2022-09-25 ASSESSMENT — PAIN SCALES - GENERAL: PAINLEVEL_OUTOF10: 6

## 2022-09-25 ASSESSMENT — PAIN DESCRIPTION - PAIN TYPE: TYPE: ACUTE PAIN

## 2022-09-25 ASSESSMENT — PAIN DESCRIPTION - FREQUENCY: FREQUENCY: INTERMITTENT

## 2022-09-25 ASSESSMENT — PAIN DESCRIPTION - DESCRIPTORS: DESCRIPTORS: SHARP

## 2022-09-25 NOTE — ED PROVIDER NOTES
[address]      Pt Name: Hattie Cardenas  MRN: [id number]  [place] 1955  Date of evaluation: 9/25/2022  Provider: Lee Ann Bowling MD    [address] [place] [address]       Chief Complaint   Patient presents with    Abdominal Pain     Pt c/o L sided abdominal/L flank pain x 1 week, worsening today. Pt denies vomiting, denies diarrhea. + nausea when pain hit her earlier, per pt. Denies current nausea. HISTORY OF PRESENT ILLNESS   (Location/Symptom, Timing/Onset, Context/Setting, Quality, Duration, Modifying Factors, Severity)  Note limiting factors. Hattie Cardenas is a 78 y.o. female with past medical history of hypertension, hyperlipidemia, diabetes, complete heart block status post pacemaker here for abdominal pain. The patient states for the last week she has been having left upper left lower abdominal pain. Notes an aching discomfort associated with some constipation. Mild nausea but no vomiting. No fevers or chills. No dysuria or hematuria. Pain moderate without alleviating factors but has acutely worsened today    HPI    Nursing Notes were reviewed. REVIEW OF SYSTEMS    (2-9 systems for level 4, 10 or more for level 5)     Review of Systems    Please see HPI for pertinent positive and negative review of system findings. A full 10 system ROS was performed and otherwise negative.         PAST MEDICAL HISTORY     Past Medical History:   Diagnosis Date    Diabetes mellitus ([address]     GERD (gastroesophageal reflux disease)     Hyperlipidemia     Hypertension     Obesity     S/P hip replacement     Sleep apnea          SURGICAL HISTORY       Past Surgical History:   Procedure Laterality Date    COLONOSCOPY  9/28/2021    COLONOSCOPY POLYPECTOMY SNARE/COLD BIOPSY performed by Jaskaran Schmidt MD at [address] [place] [address] Left Oct '13    UPPER GASTROINTESTINAL ENDOSCOPY N/A 9/28/2021    EGD BIOPSY performed by Jorge Davis Susana Rucker MD at [address] N/A 10/13/2021    ESOPHAGOGASTRODUODENOSCOPY RADIOFREQUENCY ABLATION performed by Bea Fischer MD at [facility] 10/13/2021    EGD BIOPSY performed by Bea Fischer MD at [facility]       Previous Medications    ALBUTEROL SULFATE HFA (PROVENTIL HFA) 108 (90 BASE) MCG/ACT INHALER    Inhale 2 puffs into the lungs every 6 hours as needed for Wheezing    AMLODIPINE (NORVASC) 5 MG TABLET    TAKE 1 TABLET EVERY DAY    ATORVASTATIN (LIPITOR) 20 MG TABLET    TAKE 1 TABLET AT BEDTIME    BLOOD GLUCOSE TEST STRIPS (TRUE METRIX BLOOD GLUCOSE TEST) STRIP    Test glucose  ONCE a day    CLOBETASOL (TEMOVATE) 0.05 % OINTMENT    Apply to affected area on the left thigh twice daily for up to 2 weeks or until improved. CLONIDINE (CATAPRES) 0.2 MG TABLET    TAKE 1 TABLET BY MOUTH TWICE DAILY    ESTRADIOL (ESTRING) 2 MG VAGINAL RING    Place 2 mg vaginally    GLIMEPIRIDE (AMARYL) 4 MG TABLET    TAKE 1 TABLET EVERY MORNING    METFORMIN (GLUCOPHAGE-XR) 500 MG EXTENDED RELEASE TABLET    TAKE 2 TABLETS BY MOUTH TWICE A DAY    METRONIDAZOLE (METROCREAM) 0.75 % CREAM    APPLY TO THE FACE 1 TO 2 TIMES DAILY FOR ROSACEA. MINOCYCLINE (MINOCIN;DYNACIN) 50 MG CAPSULE    TAKE 1 CAPSULE BY MOUTH TWICE A DAY    MOMETASONE (NASONEX) 50 MCG/ACT NASAL SPRAY    SHAKE WELL AND USE 2 SPRAYS NASALLY DAILY    OXAPROZIN (DAYPRO) 600 MG TABLET    TAKE 1 TABLET BY MOUTH EVERY DAY    PANTOPRAZOLE (PROTONIX) 40 MG TABLET    TAKE 1 TABLET BY MOUTH DAILY    TACROLIMUS (PROTOPIC) 0.1 % OINTMENT    Apply topically to the face 2 times daily if needed for rosacea. VALSARTAN-HYDROCHLOROTHIAZIDE (DIOVAN-HCT) 320-25 MG PER TABLET    Take 1 tablet by mouth in the morning.        ALLERGIES     Codeine and Penicillins    FAMILY HISTORY       Family History   Problem Relation Age of Onset    Heart Disease Mother           SOCIAL HISTORY       Social History     Socioeconomic History    Marital status:      Spouse name: None    Number of children: None    Years of education: None    Highest education level: None   Tobacco Use    Smoking status: Never    Smokeless tobacco: Never   Vaping Use    Vaping Use: Never used   Substance and Sexual Activity    Alcohol use: No    Drug use: No    Sexual activity: Yes     Partners: Male     Social Determinants of Health     Financial Resource Strain: Low Risk     Difficulty of Paying Living Expenses: Not very hard   Food Insecurity: No Food Insecurity    Worried About [employer] in the Last Year: Never true    [facility] in the Last Year: Never true       SCREENINGS    Randolph Coma Scale  Eye Opening: Spontaneous  Best Verbal Response: Oriented  Best Motor Response: Obeys commands  Randolph Coma Scale Score: 15          PHYSICAL EXAM    (up to 7 for level 4, 8 or more for level 5)     ED Triage Vitals [09/25/22 1458]   BP Temp Temp Source Heart Rate Resp SpO2 Height Weight   (!) 142/77 97.6 °F (36.4 °C) Oral 87 20 96 % 5' 2\" (1.575 m) 249 lb 4 oz (113.1 kg)       Physical Exam    General appearance:  Cooperative. No acute distress. Skin:  Warm. Dry. Eye:  Extraocular movements intact. Ears, nose, mouth and throat:  Oral mucosa moist,  Neck:  Trachea midline. Heart:  Regular rate and rhythm  Perfusion:  intact  Respiratory:  Respirations nonlabored. Lungs clear to auscultation bilaterally. Abdominal:   Non distended. There are some tenderness to palpation to the left mid and left lower abdomen with no rebound or guarding  Neurological:  Alert and oriented x 3.   Moves all extremities spontaneously  Musculoskeletal:   Normal ROM, no deformities          Psychiatric:  Normal mood      DIAGNOSTIC RESULTS       Labs Reviewed   CBC WITH AUTO DIFFERENTIAL - Abnormal; Notable for the following components:       Result Value    Hemoglobin 11.6 (*)     Hematocrit 35.1 (*) All other components within normal limits   COMPREHENSIVE METABOLIC PANEL W/ REFLEX TO MG FOR LOW K - Abnormal; Notable for the following components:    Glucose 168 (*)     BUN 22 (*)     All other components within normal limits   URINALYSIS WITH REFLEX TO CULTURE - Abnormal; Notable for the following components:    Leukocyte Esterase, Urine TRACE (*)     All other components within normal limits   MICROSCOPIC URINALYSIS - Abnormal; Notable for the following components:    WBC, UA 6-9 (*)     Epithelial Cells, UA 6-10 (*)     Bacteria, UA 1+ (*)     All other components within normal limits   LIPASE       Interpretation per the Radiologist below, if obtained/available at the time of this note:    CT ABDOMEN PELVIS W IV CONTRAST Additional Contrast? None   Final Result      Acute sigmoid diverticulitis. No evidence of drainable located fluid collection or abscess. All other labs/imaging were within normal range or not returned as of this dictation. EMERGENCY DEPARTMENT COURSE and DIFFERENTIAL DIAGNOSIS/MDM:   Vitals:    Vitals:    09/25/22 1458   BP: (!) 142/77   Pulse: 87   Resp: 20   Temp: 97.6 °F (36.4 °C)   TempSrc: Oral   SpO2: 96%   Weight: 249 lb 4 oz (113.1 kg)   Height: 5' 2\" (1.575 m)       The patient presents to the emergency department today complaining of left lower quadrant abdominal pain with some associated constipation. Some tenderness noted on exam.  Vital signs stable. Laboratory studies showed normal white blood cell count. Urinalysis contaminated though questionable for possible infection. Given her age, comorbidities and left-sided abdominal pain I did perform a CT scan which showed uncomplicated diverticulitis. No perforation or abscess. Patient feels much improved with medications here. I do feel that she would be a good treatment candidate for outpatient management. Given Cipro and Flagyl here and will be discharged home with a prescription for the same.   Given strict return precautions and encouraged to follow-up with PCP    Pedro Pablo VELAZQUEZ M.D., am the primary clinician of record. MDM      CONSULTS     None    Critical Care:   None    REASSESSMENT          PROCEDURE     Unless otherwise noted below, none     Procedures      FINAL IMPRESSION      1. Diverticulitis of colon            DISPOSITION/PLAN   DISPOSITION Decision To Discharge 09/25/2022 04:46:00 PM        PATIENT REFERRED TO:  Kasia Gannon MD  [address] [place] [id number]    Schedule an appointment as soon as possible for a visit       DISCHARGE MEDICATIONS:  New Prescriptions    CIPROFLOXACIN (CIPRO) 500 MG TABLET    Take 1 tablet by mouth 2 times daily for 10 days    METRONIDAZOLE (FLAGYL) 500 MG TABLET    Take 1 tablet by mouth 2 times daily for 10 days    ONDANSETRON (ZOFRAN) 4 MG TABLET    Take 1 tablet by mouth 3 times daily as needed for Nausea or Vomiting     Controlled Substances Monitoring:     RX Monitoring 7/28/2015   Periodic Controlled Substance Monitoring No signs of potential drug abuse or diversion identified.        (Please note that portions of this note were completed with a voice recognition program.  Efforts were made to edit the dictations but occasionally words are mis-transcribed.)    Pedro Pablo Mcneill MD (electronically signed)  Attending Emergency Physician            Bradford Meraz MD  09/25/22 7897

## 2022-09-27 ENCOUNTER — OFFICE VISIT (OUTPATIENT)
Dept: PRIMARY CARE CLINIC | Age: 67
End: 2022-09-27
Payer: MEDICARE

## 2022-09-27 VITALS
SYSTOLIC BLOOD PRESSURE: 119 MMHG | OXYGEN SATURATION: 100 % | WEIGHT: 240 LBS | HEART RATE: 69 BPM | BODY MASS INDEX: 44.16 KG/M2 | HEIGHT: 62 IN | DIASTOLIC BLOOD PRESSURE: 78 MMHG | TEMPERATURE: 97.4 F

## 2022-09-27 DIAGNOSIS — K59.00 CONSTIPATION, UNSPECIFIED CONSTIPATION TYPE: ICD-10-CM

## 2022-09-27 DIAGNOSIS — K57.92 ACUTE DIVERTICULITIS: Primary | ICD-10-CM

## 2022-09-27 PROCEDURE — G8417 CALC BMI ABV UP PARAM F/U: HCPCS | Performed by: FAMILY MEDICINE

## 2022-09-27 PROCEDURE — G8427 DOCREV CUR MEDS BY ELIG CLIN: HCPCS | Performed by: FAMILY MEDICINE

## 2022-09-27 PROCEDURE — 3017F COLORECTAL CA SCREEN DOC REV: CPT | Performed by: FAMILY MEDICINE

## 2022-09-27 PROCEDURE — 1036F TOBACCO NON-USER: CPT | Performed by: FAMILY MEDICINE

## 2022-09-27 PROCEDURE — G8399 PT W/DXA RESULTS DOCUMENT: HCPCS | Performed by: FAMILY MEDICINE

## 2022-09-27 PROCEDURE — 1123F ACP DISCUSS/DSCN MKR DOCD: CPT | Performed by: FAMILY MEDICINE

## 2022-09-27 PROCEDURE — 1090F PRES/ABSN URINE INCON ASSESS: CPT | Performed by: FAMILY MEDICINE

## 2022-09-27 PROCEDURE — 99213 OFFICE O/P EST LOW 20 MIN: CPT | Performed by: FAMILY MEDICINE

## 2022-09-27 RX ORDER — TRAMADOL HYDROCHLORIDE 50 MG/1
50 TABLET ORAL EVERY 6 HOURS PRN
Qty: 12 TABLET | Refills: 0 | Status: SHIPPED | OUTPATIENT
Start: 2022-09-27 | End: 2022-09-30

## 2022-09-27 RX ORDER — DOCUSATE SODIUM 100 MG/1
100 CAPSULE, LIQUID FILLED ORAL 2 TIMES DAILY PRN
Qty: 60 CAPSULE | Refills: 0 | Status: SHIPPED | OUTPATIENT
Start: 2022-09-27

## 2022-09-27 ASSESSMENT — PATIENT HEALTH QUESTIONNAIRE - PHQ9
SUM OF ALL RESPONSES TO PHQ QUESTIONS 1-9: 0
1. LITTLE INTEREST OR PLEASURE IN DOING THINGS: 0
SUM OF ALL RESPONSES TO PHQ QUESTIONS 1-9: 0
SUM OF ALL RESPONSES TO PHQ9 QUESTIONS 1 & 2: 0
SUM OF ALL RESPONSES TO PHQ QUESTIONS 1-9: 0
SUM OF ALL RESPONSES TO PHQ QUESTIONS 1-9: 0
2. FEELING DOWN, DEPRESSED OR HOPELESS: 0

## 2022-09-27 NOTE — LETTER
[facility]  [address] [place] [address]  Phone: [phone]  Fax: [phone]    Janay Minaya MD        September 27, 2022     Patient: Matilde Cain   YOB: 1955   Date of Visit: 9/27/2022       To Whom it May Concern:    Matilde Cain was seen in my clinic on 9/27/2022. She may return to work on 10/4/2022. If you have any questions or concerns, please don't hesitate to call.     Sincerely,         Janay Minaya MD

## 2022-09-27 NOTE — PROGRESS NOTES
CC ER follow up    HPI 78 y.o. PMH hypertension, hyperlipidemia, diabetes s/p pacemaker  For CHB    Seen in ER for acute abdominal pain  LLQ  Without diarrhea. Some constipation at  The  time. Last BM 3 days ago. No vomiting. No fever  Did have some left upper abdomen pain now gone. Some constipation ,last bp 3 days ago. CT abdomen  Acute sigmoid diverticulitis   Elevated wbc      Physical Exam  Constitutional:       General: She is not in acute distress. Appearance: She is well-developed. She is not diaphoretic. HENT:      Head: Normocephalic and atraumatic. Right Ear: External ear normal.      Left Ear: External ear normal.      Nose: Nose normal.      Mouth/Throat:      Pharynx: No oropharyngeal exudate. Eyes:      General: No scleral icterus. Left eye: No discharge. Conjunctiva/sclera: Conjunctivae normal.      Pupils: Pupils are equal, round, and reactive to light. Neck:      Thyroid: No thyromegaly. Vascular: No JVD. Trachea: No tracheal deviation. Cardiovascular:      Rate and Rhythm: Normal rate and regular rhythm. Heart sounds: Normal heart sounds. No murmur heard. No friction rub. No gallop. Pulmonary:      Effort: Pulmonary effort is normal. No respiratory distress. Breath sounds: Normal breath sounds. No stridor. No wheezing or rales. Chest:      Chest wall: No tenderness. Abdominal:      General: Bowel sounds are normal. There is no distension. Palpations: Abdomen is soft. There is no mass. Tenderness: There is no abdominal tenderness. There is no guarding or rebound. Comments: No rebound tenderness   Musculoskeletal:         General: No tenderness. Normal range of motion. Cervical back: Normal range of motion and neck supple. Lymphadenopathy:      Cervical: No cervical adenopathy. Skin:     General: Skin is warm and dry. Coloration: Skin is not pale. Findings: No erythema or rash.    Neurological: Mental Status: She is alert and oriented to person, place, and time. Cranial Nerves: No cranial nerve deficit. Coordination: Coordination normal.      Deep Tendon Reflexes: Reflexes are normal and symmetric. Reflexes normal.   Psychiatric:         Behavior: Behavior normal.         Thought Content: Thought content normal.         Judgment: Judgment normal.         Pastor Crook was seen today for follow-up from hospital.    Diagnoses and all orders for this visit:    Acute diverticulitis  Cont metronidazole and cipro  Add pain meds  Increase fluids   -     traMADol (ULTRAM) 50 MG tablet; Take 1 tablet by mouth every 6 hours as needed for Pain for up to 3 days. Intended supply: 3 days. Take lowest dose possible to manage pain    Constipation, unspecified constipation type  -     docusate sodium (COLACE) 100 MG capsule;  Take 1 capsule by mouth 2 times daily as needed for Constipation    First off day 9/27/2022

## 2022-09-29 ENCOUNTER — NURSE ONLY (OUTPATIENT)
Dept: CARDIOLOGY CLINIC | Age: 67
End: 2022-09-29
Payer: MEDICARE

## 2022-09-29 DIAGNOSIS — Z95.0 PACEMAKER: ICD-10-CM

## 2022-09-29 DIAGNOSIS — I44.2 COMPLETE HEART BLOCK (HCC): Primary | ICD-10-CM

## 2022-09-29 PROCEDURE — 93294 REM INTERROG EVL PM/LDLS PM: CPT | Performed by: INTERNAL MEDICINE

## 2022-09-29 PROCEDURE — 93296 REM INTERROG EVL PM/IDS: CPT | Performed by: INTERNAL MEDICINE

## 2022-09-29 NOTE — PROGRESS NOTES
We received remote transmission from patient's dual chamber pacemaker monitor at home. Transmission shows normal sensing and pacing function. No arrhythmias/ or events. Ap 15.5%   100% (MVP Off)  Echo 61.0529 showed EF of 60%. EP physician will review. See interrogation under cardiology tab in the [address] [place] [address] [address] field for more details. Will continue to monitor remotely.      (End of 91-day monitoring period 9/29/22)

## 2022-10-04 ENCOUNTER — TELEPHONE (OUTPATIENT)
Dept: PRIMARY CARE CLINIC | Age: 67
End: 2022-10-04

## 2022-10-08 DIAGNOSIS — K59.00 CONSTIPATION, UNSPECIFIED CONSTIPATION TYPE: ICD-10-CM

## 2022-10-10 RX ORDER — DOCUSATE SODIUM 100 MG/1
CAPSULE, LIQUID FILLED ORAL
Qty: 60 CAPSULE | Refills: 0 | OUTPATIENT
Start: 2022-10-10

## 2022-10-11 ENCOUNTER — OFFICE VISIT (OUTPATIENT)
Dept: PRIMARY CARE CLINIC | Age: 67
End: 2022-10-11
Payer: MEDICARE

## 2022-10-11 VITALS
BODY MASS INDEX: 43.79 KG/M2 | TEMPERATURE: 97.7 F | WEIGHT: 238 LBS | HEIGHT: 62 IN | HEART RATE: 70 BPM | OXYGEN SATURATION: 97 % | SYSTOLIC BLOOD PRESSURE: 126 MMHG | DIASTOLIC BLOOD PRESSURE: 81 MMHG

## 2022-10-11 DIAGNOSIS — K57.92 ACUTE DIVERTICULITIS: Primary | ICD-10-CM

## 2022-10-11 PROCEDURE — 1123F ACP DISCUSS/DSCN MKR DOCD: CPT | Performed by: FAMILY MEDICINE

## 2022-10-11 PROCEDURE — 99213 OFFICE O/P EST LOW 20 MIN: CPT | Performed by: FAMILY MEDICINE

## 2022-10-11 PROCEDURE — G8399 PT W/DXA RESULTS DOCUMENT: HCPCS | Performed by: FAMILY MEDICINE

## 2022-10-11 PROCEDURE — G8484 FLU IMMUNIZE NO ADMIN: HCPCS | Performed by: FAMILY MEDICINE

## 2022-10-11 PROCEDURE — 1090F PRES/ABSN URINE INCON ASSESS: CPT | Performed by: FAMILY MEDICINE

## 2022-10-11 PROCEDURE — 3017F COLORECTAL CA SCREEN DOC REV: CPT | Performed by: FAMILY MEDICINE

## 2022-10-11 PROCEDURE — 1036F TOBACCO NON-USER: CPT | Performed by: FAMILY MEDICINE

## 2022-10-11 PROCEDURE — G8417 CALC BMI ABV UP PARAM F/U: HCPCS | Performed by: FAMILY MEDICINE

## 2022-10-11 PROCEDURE — G8427 DOCREV CUR MEDS BY ELIG CLIN: HCPCS | Performed by: FAMILY MEDICINE

## 2022-10-11 ASSESSMENT — PATIENT HEALTH QUESTIONNAIRE - PHQ9
SUM OF ALL RESPONSES TO PHQ QUESTIONS 1-9: 0
1. LITTLE INTEREST OR PLEASURE IN DOING THINGS: 0
SUM OF ALL RESPONSES TO PHQ9 QUESTIONS 1 & 2: 0
SUM OF ALL RESPONSES TO PHQ QUESTIONS 1-9: 0
2. FEELING DOWN, DEPRESSED OR HOPELESS: 0
SUM OF ALL RESPONSES TO PHQ QUESTIONS 1-9: 0
SUM OF ALL RESPONSES TO PHQ QUESTIONS 1-9: 0

## 2022-10-11 NOTE — PROGRESS NOTES
CC 2 weeks fu for abdominal pain and constipation     HPI 78 y.o. female recent treatment for presumptive acute diverticulitis. Currently pain pretty much resolved. No diarrhea and constipation improved. Last colonoscopy and EGD done 2021  per  Dr Rema Delgado        Physical Exam  Pulmonary:      Effort: Pulmonary effort is normal.   Abdominal:      General: Abdomen is flat. Susana Arora was seen today for follow-up.     Diagnoses and all orders for this visit:    Acute diverticulitis    Clinically resolved    She was told to make fu  With GI for re evaluation

## 2022-11-15 DIAGNOSIS — K21.9 GASTROESOPHAGEAL REFLUX DISEASE WITHOUT ESOPHAGITIS: ICD-10-CM

## 2022-11-18 NOTE — TELEPHONE ENCOUNTER
PT called in regarding a message she received from Raine Rucker about which pharmacy to send her prescription of pantoprazole to. PT says it should be sent to the [facility].

## 2022-11-20 RX ORDER — PANTOPRAZOLE SODIUM 40 MG/1
TABLET, DELAYED RELEASE ORAL
Qty: 90 TABLET | Refills: 2 | Status: SHIPPED | OUTPATIENT
Start: 2022-11-20

## 2022-11-29 ENCOUNTER — NURSE ONLY (OUTPATIENT)
Dept: CARDIOLOGY CLINIC | Age: 67
End: 2022-11-29

## 2022-11-29 NOTE — PROGRESS NOTES
Manual remote transmission received from patient's dual chamber pacemaker monitor at home. Transmission shows normal sensing and pacing function. Noted NSVT and AT w competitive Ap (7mins, <0.1% burden). Ap 18.6%   100% (MVP Off)  PVCs <0.1/hr  Echo 49.4141 showed EF of 60%. EP physician will review. See interrogation under cardiology tab in the [address] [place] [address] [address] field for more details. Will continue to monitor remotely.

## 2022-12-05 RX ORDER — MINOCYCLINE HYDROCHLORIDE 50 MG/1
CAPSULE ORAL
Qty: 60 CAPSULE | Refills: 0 | Status: SHIPPED | OUTPATIENT
Start: 2022-12-05

## 2023-01-05 ENCOUNTER — NURSE ONLY (OUTPATIENT)
Dept: CARDIOLOGY CLINIC | Age: 68
End: 2023-01-05
Payer: MEDICARE

## 2023-01-05 DIAGNOSIS — Z95.0 PACEMAKER: ICD-10-CM

## 2023-01-05 DIAGNOSIS — I44.2 COMPLETE HEART BLOCK (HCC): Primary | ICD-10-CM

## 2023-01-05 PROCEDURE — 93296 REM INTERROG EVL PM/IDS: CPT | Performed by: INTERNAL MEDICINE

## 2023-01-05 PROCEDURE — 93294 REM INTERROG EVL PM/LDLS PM: CPT | Performed by: INTERNAL MEDICINE

## 2023-01-05 RX ORDER — MINOCYCLINE HYDROCHLORIDE 50 MG/1
CAPSULE ORAL
Qty: 60 CAPSULE | Refills: 0 | Status: SHIPPED | OUTPATIENT
Start: 2023-01-05

## 2023-01-10 NOTE — PROGRESS NOTES
We received remote transmission from patient's dual chamber pacemaker monitor at home. Transmission shows normal sensing and pacing function. NSVT noted (doesn't appear to be on a beta blocker). Ap 13.4%   100% (MVP Off)  Echo 93.2387 showed EF of 60%. EP physician will review. See interrogation under cardiology tab in the [address] [place] [address] [address] field for more details. Will continue to monitor remotely.      (End of 91-day monitoring period 1/5/23)

## 2023-02-07 ENCOUNTER — TELEPHONE (OUTPATIENT)
Dept: PRIMARY CARE CLINIC | Age: 68
End: 2023-02-07

## 2023-02-07 NOTE — TELEPHONE ENCOUNTER
----- Message from [facility] sent at 2/7/2023  2:12 PM EST -----  Subject: Message to Provider    QUESTIONS  Information for Provider? needs a AWV with Poly Aguila for early morning   ---------------------------------------------------------------------------  --------------  Lorne Mt INFO  [phone]; Do not leave any message, patient will call back for answer  ---------------------------------------------------------------------------  --------------  SCRIPT ANSWERS  Relationship to Patient?  Self

## 2023-02-07 NOTE — TELEPHONE ENCOUNTER
----- Message from Azima sent at 2/7/2023  2:12 PM EST -----  Subject: Message to Provider    QUESTIONS  Information for Provider? needs a AWV with Poly Aguila for early morning   ---------------------------------------------------------------------------  --------------  Lorne Mt INFO  [phone]; Do not leave any message, patient will call back for answer  ---------------------------------------------------------------------------  --------------  SCRIPT ANSWERS  Relationship to Patient?  Self

## 2023-02-07 NOTE — TELEPHONE ENCOUNTER
----- Message from Leticia Floyd sent at 2/7/2023  2:21 PM EST -----  Subject: Message to Provider    QUESTIONS  Information for Provider? Pt calling as her insurance will now cover the   Trulicity. Pt needs a new RX written and sent to pharmacy Please call with   information.  ---------------------------------------------------------------------------  --------------  Caryle Auerbach INFO  [phone]; OK to leave message on voicemail  ---------------------------------------------------------------------------  --------------  SCRIPT ANSWERS  Relationship to Patient?  Self

## 2023-02-09 DIAGNOSIS — E11.65 UNCONTROLLED TYPE 2 DIABETES MELLITUS WITH HYPERGLYCEMIA (HCC): Primary | ICD-10-CM

## 2023-02-09 RX ORDER — DULAGLUTIDE 1.5 MG/.5ML
1.5 INJECTION, SOLUTION SUBCUTANEOUS WEEKLY
Qty: 4 ADJUSTABLE DOSE PRE-FILLED PEN SYRINGE | Refills: 3 | Status: SHIPPED | OUTPATIENT
Start: 2023-02-09

## 2023-02-09 NOTE — TELEPHONE ENCOUNTER
Medication:   Requested Prescriptions     Pending Prescriptions Disp Refills    Dulaglutide 0.75 MG/0.5ML SOPN       Sig: Inject 0.75 mg into the skin once a week        Last Filled:      Patient Phone Number: [phone] (home) [phone] (work)    Last appt: 10/11/2022   Next appt: 3/1/2023    Last OARRS:   RX Monitoring 7/28/2015   Periodic Controlled Substance Monitoring No signs of potential drug abuse or diversion identified.

## 2023-02-09 NOTE — TELEPHONE ENCOUNTER
PT called in stating that her insurance will no cover the Trulicity 8.16 mg. Pt would like to know if Dr. Wilton Jason can send that to the [facility] on [address]. She also wanted to know if Dr. Wilton Jason could call in the Invocana-Metformin combination as well.      Best call back number: [phone]

## 2023-03-01 ENCOUNTER — OFFICE VISIT (OUTPATIENT)
Dept: PRIMARY CARE CLINIC | Age: 68
End: 2023-03-01

## 2023-03-01 VITALS
HEIGHT: 62 IN | TEMPERATURE: 97.4 F | DIASTOLIC BLOOD PRESSURE: 80 MMHG | SYSTOLIC BLOOD PRESSURE: 123 MMHG | WEIGHT: 248 LBS | OXYGEN SATURATION: 98 % | BODY MASS INDEX: 45.64 KG/M2 | HEART RATE: 71 BPM

## 2023-03-01 DIAGNOSIS — Z12.31 ENCOUNTER FOR MAMMOGRAM TO ESTABLISH BASELINE MAMMOGRAM: ICD-10-CM

## 2023-03-01 DIAGNOSIS — Z00.00 INITIAL MEDICARE ANNUAL WELLNESS VISIT: ICD-10-CM

## 2023-03-01 DIAGNOSIS — E66.01 MORBID OBESITY WITH BMI OF 45.0-49.9, ADULT (HCC): ICD-10-CM

## 2023-03-01 DIAGNOSIS — I10 PRIMARY HYPERTENSION: ICD-10-CM

## 2023-03-01 DIAGNOSIS — E11.9 TYPE 2 DIABETES MELLITUS WITHOUT COMPLICATION, WITHOUT LONG-TERM CURRENT USE OF INSULIN (HCC): ICD-10-CM

## 2023-03-01 DIAGNOSIS — Z23 NEED FOR SHINGLES VACCINE: ICD-10-CM

## 2023-03-01 DIAGNOSIS — M05.9 RHEUMATOID ARTHRITIS WITH RHEUMATOID FACTOR, UNSPECIFIED (HCC): ICD-10-CM

## 2023-03-01 DIAGNOSIS — E11.65 UNCONTROLLED TYPE 2 DIABETES MELLITUS WITH HYPERGLYCEMIA (HCC): Primary | ICD-10-CM

## 2023-03-01 PROBLEM — I44.2 COMPLETE HEART BLOCK (HCC): Status: RESOLVED | Noted: 2021-04-23 | Resolved: 2023-03-01

## 2023-03-01 LAB — HBA1C MFR BLD: 9.6 %

## 2023-03-01 RX ORDER — AMLODIPINE BESYLATE 5 MG/1
TABLET ORAL
Qty: 90 TABLET | Refills: 2 | Status: SHIPPED | OUTPATIENT
Start: 2023-03-01

## 2023-03-01 RX ORDER — CLONIDINE HYDROCHLORIDE 0.2 MG/1
TABLET ORAL
Qty: 180 TABLET | Refills: 1 | Status: SHIPPED | OUTPATIENT
Start: 2023-03-01

## 2023-03-01 RX ORDER — DULAGLUTIDE 1.5 MG/.5ML
1.5 INJECTION, SOLUTION SUBCUTANEOUS WEEKLY
Qty: 4 ADJUSTABLE DOSE PRE-FILLED PEN SYRINGE | Refills: 3 | Status: SHIPPED | OUTPATIENT
Start: 2023-03-01

## 2023-03-01 RX ORDER — ZOSTER VACCINE RECOMBINANT, ADJUVANTED 50 MCG/0.5
0.5 KIT INTRAMUSCULAR ONCE
Qty: 0.5 ML | Refills: 0 | Status: SHIPPED | OUTPATIENT
Start: 2023-03-01 | End: 2023-03-01

## 2023-03-01 RX ORDER — VALSARTAN AND HYDROCHLOROTHIAZIDE 320; 25 MG/1; MG/1
1 TABLET, FILM COATED ORAL DAILY
Qty: 90 TABLET | Refills: 1 | Status: SHIPPED | OUTPATIENT
Start: 2023-03-01

## 2023-03-01 SDOH — ECONOMIC STABILITY: INCOME INSECURITY: HOW HARD IS IT FOR YOU TO PAY FOR THE VERY BASICS LIKE FOOD, HOUSING, MEDICAL CARE, AND HEATING?: NOT HARD AT ALL

## 2023-03-01 SDOH — ECONOMIC STABILITY: HOUSING INSECURITY
IN THE LAST 12 MONTHS, WAS THERE A TIME WHEN YOU DID NOT HAVE A STEADY PLACE TO SLEEP OR SLEPT IN A SHELTER (INCLUDING NOW)?: NO

## 2023-03-01 SDOH — ECONOMIC STABILITY: FOOD INSECURITY: WITHIN THE PAST 12 MONTHS, YOU WORRIED THAT YOUR FOOD WOULD RUN OUT BEFORE YOU GOT MONEY TO BUY MORE.: NEVER TRUE

## 2023-03-01 SDOH — ECONOMIC STABILITY: FOOD INSECURITY: WITHIN THE PAST 12 MONTHS, THE FOOD YOU BOUGHT JUST DIDN'T LAST AND YOU DIDN'T HAVE MONEY TO GET MORE.: NEVER TRUE

## 2023-03-01 ASSESSMENT — LIFESTYLE VARIABLES
HOW OFTEN DO YOU HAVE A DRINK CONTAINING ALCOHOL: NEVER
HOW MANY STANDARD DRINKS CONTAINING ALCOHOL DO YOU HAVE ON A TYPICAL DAY: PATIENT DOES NOT DRINK

## 2023-03-01 ASSESSMENT — PATIENT HEALTH QUESTIONNAIRE - PHQ9
2. FEELING DOWN, DEPRESSED OR HOPELESS: 0
SUM OF ALL RESPONSES TO PHQ QUESTIONS 1-9: 0
1. LITTLE INTEREST OR PLEASURE IN DOING THINGS: 0
SUM OF ALL RESPONSES TO PHQ QUESTIONS 1-9: 0
SUM OF ALL RESPONSES TO PHQ9 QUESTIONS 1 & 2: 0

## 2023-03-01 NOTE — PROGRESS NOTES
Medicare Annual Wellness Visit    Lino Villanueva is here for Medicare AWV (Follow up)    Assessment & Plan   Uncontrolled type 2 diabetes mellitus with hyperglycemia (HCC)  AIC 9.6 increased since last visit    Goal < 7  Restart invokamet   Cont all of current diabetic meds  Exercise  Low delroy diet  -     dulaglutide (TRULICITY) 1.5 OR/6.6VS SC injection; Inject 0.5 mLs into the skin once a week Stop Trulicity 5.15, Disp-4 Adjustable Dose Pre-filled Pen Syringe, R-3Normal  -     HM DIABETES FOOT EXAM  -     MICROALBUMIN / CREATININE URINE RATIO; Future  Type 2 diabetes mellitus without complication, without long-term current use of insulin (HCC)    See above  -     POCT glycosylated hemoglobin (Hb A1C)  -     canagliflozin-metFORMIN HCl (INVOKAMET) 150-500 MG; Take 1 tablet by mouth 2 times daily (with meals), Disp-60 tablet, R-3Normal  -     HM DIABETES FOOT EXAM    -     MICROALBUMIN / CREATININE URINE RATIO; Future  Primary hypertension  Bp at goal  Ck labs  refill  -     valsartan-hydroCHLOROthiazide (DIOVAN-HCT) 320-25 MG per tablet; Take 1 tablet by mouth daily, Disp-90 tablet, R-1Normal  -     CBC; Future  -     Comprehensive Metabolic Panel; Future  -     Urinalysis; Future  -     TSH  -     Lipid Panel; Future  Body mass index (BMI) 45.0-49.9, adult ([facility])  Wt gain  Discussed diet, exercise  Rheumatoid arthritis with rheumatoid factor, unspecified ([facility] [address]  Encounter for mammogram to establish baseline mammogram  -     [facility] DIGITAL SCREEN W OR WO CAD BILATERAL; Future  Need for shingles vaccine  -     zoster recombinant adjuvanted vaccine [facility]) 50 MCG/0.5ML SUSR injection;  Inject 0.5 mLs into the muscle once for 1 dose 2 doses  4-6 months, Disp-0.5 mL, R-0Normal  Morbid obesity with BMI of 45.0-49.9, adult ([facility])    Recommendations for Preventive Services Due: see orders and patient instructions/AVS.  Recommended screening schedule for the next 5-10 years is provided to the patient in written form: see Patient Instructions/AVS.     No follow-ups on file. Subjective   AWV  Problems  diabetes type 2  No polyuria, polydipsia, weight changes or blurred vision  AM glucose around 150 -160's   No hypoglycemia    HTN  No chest pain, headache, sob, leg edema, stroke, kidney disease     Morbid obese  Will start dit  Wt Readings from Last 3 Encounters:   03/01/23 248 lb (112.5 kg)   10/11/22 238 lb (108 kg)   09/27/22 240 lb (108.9 kg)     History of RA  Mostly in both knees  Followed by specialist dr Erin Anderson  On oxaprozinecontrolled sxs    Patient's complete Health Risk Assessment and screening values have been reviewed and are found in Flowsheets. The following problems were reviewed today and where indicated follow up appointments were made and/or referrals ordered. Positive Risk Factor Screenings with Interventions:                 Weight and Activity:  Physical Activity: Inactive    Days of Exercise per Week: 0 days    Minutes of Exercise per Session: 0 min     On average, how many days per week do you engage in moderate to strenuous exercise (like a brisk walk)?: 0 days  Have you lost any weight without trying in the past 3 months?: No  Body mass index: (!) 45.35      Inactivity Interventions:  No real wt loss recently see last 3 weights  Obesity Interventions:  Start low delroy diet  Stressed exercises          Dentist Screen:  Have you seen the dentist within the past year?: (!) No    Intervention:  Advised to schedule with their dentist                Physical Exam  Constitutional:       General: She is not in acute distress. Appearance: She is well-developed. She is not diaphoretic. HENT:      Head: Normocephalic and atraumatic. Right Ear: External ear normal.      Left Ear: External ear normal.      Nose: Nose normal.      Mouth/Throat:      Pharynx: No oropharyngeal exudate. Eyes:      General: No scleral icterus. Left eye: No discharge.       Conjunctiva/sclera: Conjunctivae normal. Pupils: Pupils are equal, round, and reactive to light. Neck:      Thyroid: No thyromegaly. Vascular: No JVD. Trachea: No tracheal deviation. Cardiovascular:      Rate and Rhythm: Normal rate and regular rhythm. Heart sounds: Normal heart sounds. No murmur heard. No friction rub. No gallop. Pulmonary:      Effort: Pulmonary effort is normal. No respiratory distress. Breath sounds: Normal breath sounds. No stridor. No wheezing or rales. Chest:      Chest wall: No tenderness. Abdominal:      General: Bowel sounds are normal. There is no distension. Palpations: Abdomen is soft. There is no mass. Tenderness: There is no abdominal tenderness. There is no guarding or rebound. Musculoskeletal:         General: No tenderness. Normal range of motion. Cervical back: Normal range of motion and neck supple. Lymphadenopathy:      Cervical: No cervical adenopathy. Skin:     General: Skin is warm and dry. Coloration: Skin is not pale. Findings: No erythema or rash. Neurological:      Mental Status: She is alert and oriented to person, place, and time. Cranial Nerves: No cranial nerve deficit. Coordination: Coordination normal.      Deep Tendon Reflexes: Reflexes are normal and symmetric. Reflexes normal.   Psychiatric:         Behavior: Behavior normal.         Thought Content: Thought content normal.         Judgment: Judgment normal.             Objective   Vitals:    03/01/23 0815   BP: 123/80   Pulse: 71   Temp: 97.4 °F (36.3 °C)   TempSrc: Temporal   SpO2: 98%   Weight: 248 lb (112.5 kg)   Height: 5' 2\" (1.575 m)      Body mass index is 45.36 kg/m². Allergies   Allergen Reactions    Codeine Itching    Penicillins Itching     Prior to Visit Medications    Medication Sig Taking?  Authorizing Provider   canagliflozin-metFORMIN HCl (INVOKAMET) 150-500 MG Take 1 tablet by mouth 2 times daily (with meals) Yes Alvin Chapman MD   dulaglutide (TRULICITY) 1.5 /8.4FG SC injection Inject 0.5 mLs into the skin once a week Stop Trulicity 5.95 Yes Karie Jacob MD   valsartan-hydroCHLOROthiazide (DIOVAN-HCT) 320-25 MG per tablet Take 1 tablet by mouth daily Yes Karie Jacob MD   amLODIPine (NORVASC) 5 MG tablet TAKE 1 TABLET EVERY DAY Yes Karie Jacob MD   cloNIDine (CATAPRES) 0.2 MG tablet TAKE 1 TABLET BY MOUTH TWICE DAILY Yes Karie Jacob MD   zoster recombinant adjuvanted vaccine [facility]) 50 MCG/0.5ML SUSR injection Inject 0.5 mLs into the muscle once for 1 dose 2 doses  4-6 months Yes Karie Jacob MD   minocycline (MINOCIN;DYNACIN) 50 MG capsule TAKE 1 CAPSULE BY MOUTH TWICE A DAY Yes Gianluca Verma MD   pantoprazole (PROTONIX) 40 MG tablet TAKE 1 TABLET BY MOUTH DAILY Yes Karie Jacob MD   oxaprozin (DAYPRO) 600 MG tablet TAKE 1 TABLET BY MOUTH EVERY DAY Yes Karie Jacob MD   docusate sodium (COLACE) 100 MG capsule Take 1 capsule by mouth 2 times daily as needed for Constipation Yes Karie Jacob MD   ondansetron (ZOFRAN) 4 MG tablet Take 1 tablet by mouth 3 times daily as needed for Nausea or Vomiting Yes Taylor Sofia MD   glimepiride (AMARYL) 4 MG tablet TAKE 1 TABLET EVERY MORNING Yes Karie Jacob MD   atorvastatin (LIPITOR) 20 MG tablet TAKE 1 TABLET AT BEDTIME Yes Karie Jacob MD   metroNIDAZOLE (METROCREAM) 0.75 % cream APPLY TO THE FACE 1 TO 2 TIMES DAILY FOR ROSACEA. Yes Gianluca Verma MD   blood glucose test strips (TRUE METRIX BLOOD GLUCOSE TEST) strip Test glucose  ONCE a day Yes Karie Jacob MD   albuterol sulfate HFA (PROVENTIL HFA) 108 (90 Base) MCG/ACT inhaler Inhale 2 puffs into the lungs every 6 hours as needed for Wheezing Yes Karie Jacob MD   estradiol (ESTRING) 2 MG vaginal ring Place 2 mg vaginally Yes Nba Jesus MD   tacrolimus (PROTOPIC) 0.1 % ointment Apply topically to the face 2 times daily if needed for rosacea.  Yes Gianluca Verma MD   clobetasol (TEMOVATE) 0.05 % ointment Apply to affected area on the left thigh twice daily for up to 2 weeks or until improved. Yes Maura Zepeda MD   mometasone (NASONEX) 50 MCG/ACT nasal spray SHAKE WELL AND USE 2 SPRAYS NASALLY DAILY Yes Laura Montgomery MD       [facility] (Including outside providers/suppliers regularly involved in providing care):   Patient Care Team:  Laura Montgomery MD as PCP - General (Family Medicine)  [facility]  Maura Zepeda MD as Consulting Physician (Dermatology)  Capri Castro III as Consulting Physician (Ophthalmology)     Reviewed and updated this visit:  Tobacco  Allergies  Meds  Problems  Med Hx  Surg Hx  Soc Hx  Fam Hx        Visual inspection:  Deformity/amputation: absent  Skin lesions/pre-ulcerative calluses: absent  Edema: right- negative, left- negative    Sensory exam:  Monofilament sensation: normal  (minimum of 5 random plantar locations tested, avoiding callused areas - > 1 area with absence of sensation is + for neuropathy)    Plus at least one of the following:  Pulses: normal,   Pinprick: Intact  Proprioception: Intact  Vibration (128 Hz):  Destin Green MD

## 2023-03-01 NOTE — LETTER
March 1, 2023       Carlotta Cooper YOB: 1955   [address] Date of Visit:  3/1/2023       To Whom It May Concern: It is my medical opinion that Carlotta Cooper requires a disability parking placard for the following reasons:  She cannot walk 200 feet without stopping to rest.  Duration of need: 5 years    If you have any questions or concerns, please don't hesitate to call.     Sincerely,        Carmela Mendez MD

## 2023-03-01 NOTE — PATIENT INSTRUCTIONS
Learning About Being Active as an Older Adult  Why is being active important as you get older? Being active is one of the best things you can do for your health. And it's never too late to start. Being active--or getting active, if you aren't already--has definite benefits. It can:  Give you more energy,  Keep your mind sharp. Improve balance to reduce your risk of falls. Help you manage chronic illness with fewer medicines. No matter how old you are, how fit you are, or what health problems you have, there is a form of activity that will work for you. And the more physical activity you can do, the better your overall health will be. What kinds of activity can help you stay healthy? Being more active will make your daily activities easier. Physical activity includes planned exercise and things you do in daily life. There are four types of activity:  Aerobic. Doing aerobic activity makes your heart and lungs strong. Includes walking, dancing, and gardening. Aim for at least 2½ hours spread throughout the week. It improves your energy and can help you sleep better. Muscle-strengthening. This type of activity can help maintain muscle and strengthen bones. Includes climbing stairs, using resistance bands, and lifting or carrying heavy loads. Aim for at least twice a week. It can help protect the knees and other joints. Stretching. Stretching gives you better range of motion in joints and muscles. Includes upper arm stretches, calf stretches, and gentle yoga. Aim for at least twice a week, preferably after your muscles are warmed up from other activities. It can help you function better in daily life. Balancing. This helps you stay coordinated and have good posture. Includes heel-to-toe walking, alonso chi, and certain types of yoga. Aim for at least 3 days a week. It can reduce your risk of falling.   Even if you have a hard time meeting the recommendations, it's better to be more active than less active. All activity done in each category counts toward your weekly total. You'd be surprised how daily things like carrying groceries, keeping up with grandchildren, and taking the stairs can add up. What keeps you from being active? If you've had a hard time being more active, you're not alone. Maybe you remember being able to do more. Or maybe you've never thought of yourself as being active. It's frustrating when you can't do the things you want. Being more active can help. What's holding you back? Getting started. Have a goal, but break it into easy tasks. Small steps build into big accomplishments. Staying motivated. If you feel like skipping your activity, remember your goal. Maybe you want to move better and stay independent. Every activity gets you one step closer. Not feeling your best.  Start with 5 minutes of an activity you enjoy. Prove to yourself you can do it. As you get comfortable, increase your time. You may not be where you want to be. But you're in the process of getting there. Everyone starts somewhere. How can you find safe ways to stay active? Talk with your doctor about any physical challenges you're facing. Make a plan with your doctor if you have a health problem or aren't sure how to get started with activity. If you're already active, ask your doctor if there is anything you should change to stay safe as your body and health change. If you tend to feel dizzy after you take medicine, avoid activity at that time. Try being active before you take your medicine. This will reduce your risk of falls. If you plan to be active at home, make sure to clear your space before you get started. Remove things like TV cords, coffee tables, and throw rugs. It's safest to have plenty of space to move freely. The key to getting more active is to take it slow and steady. Try to improve only a little bit at a time.  Pick just one area to improve on at first. And if an activity hurts, stop and talk to your doctor. Where can you learn more? Go to http://www.howell.com/ and enter P600 to learn more about \"Learning About Being Active as an Older Adult. \"  Current as of: October 10, 2022               Content Version: 13.5  © 0378-0454 Healthwise, Incorporated. Care instructions adapted under license by [facility] ([facility]). If you have questions about a medical condition or this instruction, always ask your healthcare professional. [place] [address] any warranty or liability for your use of this information. Learning About Dental Care for Older Adults  Dental care for older adults: Overview  Dental care for older people is much the same as for younger adults. But older adults do have concerns that younger adults do not. Older adults may have problems with gum disease and decay on the roots of their teeth. They may need missing teeth replaced or broken fillings fixed. Or they may have dentures that need to be cared for. Some older adults may have trouble holding a toothbrush. You can help remind the person you are caring for to brush and floss their teeth or to clean their dentures. In some cases, you may need to do the brushing and other dental care tasks. People who have trouble using their hands or who have dementia may need this extra help. How can you help with dental care? Normal dental care  To keep the teeth and gums healthy:  Brush the teeth with fluoride toothpaste twice a day--in the morning and at night--and floss at least once a day. Plaque can quickly build up on the teeth of older adults. Watch for the signs of gum disease. These signs include gums that bleed after brushing or after eating hard foods, such as apples. See a dentist regularly. Many experts recommend checkups every 6 months. Keep the dentist up to date on any new medications the person is taking.   Encourage a balanced diet that includes whole grains, vegetables, and fruits, and that is low in saturated fat and sodium. Encourage the person you're caring for not to use tobacco products. They can affect dental and general health. Many older adults have a fixed income and feel that they can't afford dental care. But most towns and [facility] have programs in which dentists help older adults by lowering fees. Contact your area's public health offices or  for information about dental care in your area. Using a toothbrush  Older adults with arthritis sometimes have trouble brushing their teeth because they can't easily hold the toothbrush. Their hands and fingers may be stiff, painful, or weak. If this is the case, you can: Offer an electric toothbrush. Enlarge the handle of a non-electric toothbrush by wrapping a sponge, an elastic bandage, or adhesive tape around it. Push the toothbrush handle through a ball made of rubber or soft foam.  Make the handle longer and thicker by taping Popsicle sticks or tongue depressors to it. You may also be able to buy special toothbrushes, toothpaste dispensers, and floss holders. Your doctor may recommend a soft-bristle toothbrush if the person you care for bleeds easily. Bleeding can happen because of a health problem or from certain medicines. A toothpaste for sensitive teeth may help if the person you care for has sensitive teeth. How do you brush and floss someone's teeth? If the person you are caring for has a hard time cleaning their teeth on their own, you may need to brush and floss their teeth for them. It may be easiest to have the person sit and face away from you, and to sit or stand behind them. That way you can steady their head against your arm as you reach around to floss and brush their teeth. Choose a place that has good lighting and is comfortable for both of you. Before you begin, gather your supplies. You will need gloves, floss, a toothbrush, and a container to hold water if you are not near a sink.  Wash and dry your hands well and put on gloves. Start by flossing:  Gently work a piece of floss between each of the teeth toward the gums. A plastic flossing tool may make this easier, and they are available at most [facility]. Curve the floss around each tooth into a U-shape and gently slide it under the gum line. Move the floss firmly up and down several times to scrape off the plaque. After you've finished flossing, throw away the used floss and begin brushing:  Wet the brush and apply toothpaste. Place the brush at a 45-degree angle where the teeth meet the gums. Press firmly, and move the brush in small circles over the surface of the teeth. Be careful not to brush too hard. Vigorous brushing can make the gums pull away from the teeth and can scratch the tooth enamel. Brush all surfaces of the teeth, on the tongue side and on the cheek side. Pay special attention to the front teeth and all surfaces of the back teeth. Brush chewing surfaces with short back-and-forth strokes. After you've finished, help the person rinse the remaining toothpaste from their mouth. Where can you learn more? Go to http://www.woods.com/ and enter F944 to learn more about \"Learning About Dental Care for Older Adults. \"  Current as of: June 16, 2022               Content Version: 13.5  © 3948-4101 Healthwise, Incorporated. Care instructions adapted under license by [facility] ([facility]). If you have questions about a medical condition or this instruction, always ask your healthcare professional. [place] [address] any warranty or liability for your use of this information. Advance Directives: Care Instructions  Overview  An advance directive is a legal way to state your wishes at the end of your life. It tells your family and your doctor what to do if you can't say what you want. There are two main types of advance directives. You can change them any time your wishes change. Living will.   This form tells your family and your doctor your wishes about life support and other treatment. The form is also called a declaration. Medical power of . This form lets you name a person to make treatment decisions for you when you can't speak for yourself. This person is called a health care agent (health care proxy, health care surrogate). The form is also called a durable power of  for health care. If you do not have an advance directive, decisions about your medical care may be made by a family member, or by a doctor or a  who doesn't know you. It may help to think of an advance directive as a gift to the people who care for you. If you have one, they won't have to make tough decisions by themselves. For more information, including forms for your state, see the [address] website (www.caringinfo.org/planning/advance-directives/). Follow-up care is a key part of your treatment and safety. Be sure to make and go to all appointments, and call your doctor if you are having problems. It's also a good idea to know your test results and keep a list of the medicines you take. What should you include in an advance directive? Many states have a unique advance directive form. (It may ask you to address specific issues.) Or you might use a universal form that's approved by many states. If your form doesn't tell you what to address, it may be hard to know what to include in your advance directive. Use the questions below to help you get started. Who do you want to make decisions about your medical care if you are not able to? What life-support measures do you want if you have a serious illness that gets worse over time or can't be cured? What are you most afraid of that might happen? (Maybe you're afraid of having pain, losing your independence, or being kept alive by machines.)  Where would you prefer to die? (Your home? A hospital? A nursing home?)  Do you want to donate your organs when you die?   Do you want certain Mandaeism practices performed before you die? When should you call for help? Be sure to contact your doctor if you have any questions. Where can you learn more? Go to http://www.howell.com/ and enter R264 to learn more about \"Advance Directives: Care Instructions. \"  Current as of: June 16, 2022               Content Version: 13.5  © 2006-2022 [employer]. Care instructions adapted under license by [facility] ([facility]). If you have questions about a medical condition or this instruction, always ask your healthcare professional. [address] any warranty or liability for your use of this information. Starting a Weight Loss Plan: Care Instructions  Overview     If you're thinking about losing weight, it can be hard to know where to start. Your doctor can help you set up a weight loss plan that best meets your needs. You may want to take a class on nutrition or exercise, or you could join a weight loss support group. If you have questions about how to make changes to your eating or exercise habits, ask your doctor about seeing a registered dietitian or an exercise specialist.  It can be a big challenge to lose weight. But you don't have to make huge changes at once. Make small changes, and stick with them. When those changes become habit, add a few more changes. If you don't think you're ready to make changes right now, try to pick a date in the future. Make an appointment to see your doctor to discuss whether the time is right for you to start a plan. Follow-up care is a key part of your treatment and safety. Be sure to make and go to all appointments, and call your doctor if you are having problems. It's also a good idea to know your test results and keep a list of the medicines you take. How can you care for yourself at home? Set realistic goals. Many people expect to lose much more weight than is likely.  A weight loss of 5% to 10% of your body weight may be enough to improve your health. Get family and friends involved to provide support. Talk to them about why you are trying to lose weight, and ask them to help. They can help by participating in exercise and having meals with you, even if they may be eating something different. Find what works best for you. If you do not have time or do not like to cook, a program that offers meal replacement bars or shakes may be better for you. Or if you like to prepare meals, finding a plan that includes daily menus and recipes may be best.  Ask your doctor about other health professionals who can help you achieve your weight loss goals. A dietitian can help you make healthy changes in your diet. An exercise specialist or  can help you develop a safe and effective exercise program.  A counselor or psychiatrist can help you cope with issues such as depression, anxiety, or family problems that can make it hard to focus on weight loss. Consider joining a support group for people who are trying to lose weight. Your doctor can suggest groups in your area. Where can you learn more? Go to http://www.woods.com/ and enter U357 to learn more about \"Starting a Weight Loss Plan: Care Instructions. \"  Current as of: August 25, 2022               Content Version: 13.5  © 2006-2022 [employer]. Care instructions adapted under license by [facility] [employer] [facility] ([facility]). If you have questions about a medical condition or this instruction, always ask your healthcare professional. [place] [address] any warranty or liability for your use of this information. A Healthy Heart: Care Instructions  Your Care Instructions     Coronary artery disease, also called heart disease, occurs when a substance called plaque builds up in the vessels that supply oxygen-rich blood to your heart muscle. This can narrow the blood vessels and reduce blood flow.  A heart attack happens when blood flow is completely blocked. A high-fat diet, smoking, and other factors increase the risk of heart disease. Your doctor has found that you have a chance of having heart disease. You can do lots of things to keep your heart healthy. It may not be easy, but you can change your diet, exercise more, and quit smoking. These steps really work to lower your chance of heart disease. Follow-up care is a key part of your treatment and safety. Be sure to make and go to all appointments, and call your doctor if you are having problems. It's also a good idea to know your test results and keep a list of the medicines you take. How can you care for yourself at home? Diet    Use less salt when you cook and eat. This helps lower your blood pressure. Taste food before salting. Add only a little salt when you think you need it. With time, your taste buds will adjust to less salt.     Eat fewer snack items, fast foods, canned soups, and other high-salt, high-fat, processed foods.     Read food labels and try to avoid saturated and trans fats. They increase your risk of heart disease by raising cholesterol levels.     Limit the amount of solid fat-butter, margarine, and shortening-you eat. Use olive, peanut, or canola oil when you cook. Bake, broil, and steam foods instead of frying them.     Eat a variety of fruit and vegetables every day. Dark green, deep orange, red, or yellow fruits and vegetables are especially good for you. Examples include spinach, carrots, peaches, and berries.     Foods high in fiber can reduce your cholesterol and provide important vitamins and minerals. High-fiber foods include whole-grain cereals and breads, oatmeal, beans, brown rice, citrus fruits, and apples.     Eat lean proteins. Heart-healthy proteins include seafood, lean meats and poultry, eggs, beans, peas, nuts, seeds, and soy products.     Limit drinks and foods with added sugar. These include candy, desserts, and soda pop.    Lifestyle changes    If your doctor recommends it, get more exercise. Walking is a good choice. Bit by bit, increase the amount you walk every day. Try for at least 30 minutes on most days of the week. You also may want to swim, bike, or do other activities.     Do not smoke. If you need help quitting, talk to your doctor about stop-smoking programs and medicines. These can increase your chances of quitting for good. Quitting smoking may be the most important step you can take to protect your heart. It is never too late to quit.     Limit alcohol to 2 drinks a day for men and 1 drink a day for women. Too much alcohol can cause health problems.     Manage other health problems such as diabetes, high blood pressure, and high cholesterol. If you think you may have a problem with alcohol or drug use, talk to your doctor. Medicines    Take your medicines exactly as prescribed. Call your doctor if you think you are having a problem with your medicine.     If your doctor recommends aspirin, take the amount directed each day. Make sure you take aspirin and not another kind of pain reliever, such as acetaminophen (Tylenol). When should you call for help? Call 911 if you have symptoms of a heart attack. These may include:    Chest pain or pressure, or a strange feeling in the chest.     Sweating.     Shortness of breath.     Pain, pressure, or a strange feeling in the back, neck, jaw, or upper belly or in one or both shoulders or arms.     Lightheadedness or sudden weakness.     A fast or irregular heartbeat. After you call 911, the  may tell you to chew 1 adult-strength or 2 to 4 low-dose aspirin. Wait for an ambulance. Do not try to drive yourself. Watch closely for changes in your health, and be sure to contact your doctor if you have any problems. Where can you learn more? Go to http://www.howell.com/ and enter F075 to learn more about \"A Healthy Heart: Care Instructions. \"  Current as of: September 7, 6141               CXKTQCJ Version: 13.5  © 6757-3655 Healthwise, Incorporated. Care instructions adapted under license by [facility] ([facility]). If you have questions about a medical condition or this instruction, always ask your healthcare professional. [address] any warranty or liability for your use of this information. Personalized Preventive Plan for Alberto Lowers - 3/1/2023  Medicare offers a range of preventive health benefits. Some of the tests and screenings are paid in full while other may be subject to a deductible, co-insurance, and/or copay. Some of these benefits include a comprehensive review of your medical history including lifestyle, illnesses that may run in your family, and various assessments and screenings as appropriate. After reviewing your medical record and screening and assessments performed today your provider may have ordered immunizations, labs, imaging, and/or referrals for you. A list of these orders (if applicable) as well as your Preventive Care list are included within your After Visit Summary for your review. Other Preventive Recommendations:    A preventive eye exam performed by an eye specialist is recommended every 1-2 years to screen for glaucoma; cataracts, macular degeneration, and other eye disorders. A preventive dental visit is recommended every 6 months. Try to get at least 150 minutes of exercise per week or 10,000 steps per day on a pedometer . Order or download the FREE \"Exercise & Physical Activity: Your Everyday Guide\" from [employer]. Call 2-392.831.7512 or search The [employer] online. You need 5264-8111 mg of calcium and 9713-2569 IU of vitamin D per day.  It is possible to meet your calcium requirement with diet alone, but a vitamin D supplement is usually necessary to meet this goal.  When exposed to the sun, use a sunscreen that protects against both UVA and UVB radiation with an SPF of 30 or greater. Reapply every 2 to 3 hours or after sweating, drying off with a towel, or swimming. Always wear a seat belt when traveling in a car. Always wear a helmet when riding a bicycle or motorcycle.

## 2023-03-13 RX ORDER — AMLODIPINE BESYLATE 5 MG/1
TABLET ORAL
Qty: 90 TABLET | Refills: 2 | OUTPATIENT
Start: 2023-03-13

## 2023-04-03 DIAGNOSIS — I10 PRIMARY HYPERTENSION: ICD-10-CM

## 2023-04-03 RX ORDER — VALSARTAN AND HYDROCHLOROTHIAZIDE 320; 25 MG/1; MG/1
TABLET, FILM COATED ORAL
Qty: 90 TABLET | Refills: 1 | Status: SHIPPED | OUTPATIENT
Start: 2023-04-03

## 2023-04-06 ENCOUNTER — NURSE ONLY (OUTPATIENT)
Dept: CARDIOLOGY CLINIC | Age: 68
End: 2023-04-06

## 2023-04-06 DIAGNOSIS — I44.2 CHB (COMPLETE HEART BLOCK) (HCC): ICD-10-CM

## 2023-04-06 DIAGNOSIS — Z95.0 PACEMAKER: Primary | ICD-10-CM

## 2023-04-07 DIAGNOSIS — I10 ESSENTIAL HYPERTENSION: ICD-10-CM

## 2023-04-07 RX ORDER — ATORVASTATIN CALCIUM 20 MG/1
TABLET, FILM COATED ORAL
Qty: 30 TABLET | Refills: 3 | Status: SHIPPED | OUTPATIENT
Start: 2023-04-07

## 2023-04-07 NOTE — PROGRESS NOTES
We received remote transmission from patient's dual chamber pacemaker monitor at home. Transmission shows normal sensing and pacing function. No arrhythmias/ or events. Ap 11.0%   100% (MVP Off)  Echo 57.6137 showed EF of 60%. EP physician will review. See interrogation under cardiology tab in the [address] [place] [address] [address] field for more details. Will continue to monitor remotely.      (End of 91-day monitoring period 4/6/23)

## 2023-05-04 ENCOUNTER — TELEPHONE (OUTPATIENT)
Dept: PRIMARY CARE CLINIC | Age: 68
End: 2023-05-04

## 2023-05-08 NOTE — TELEPHONE ENCOUNTER
Per last visit 3/1/2023, pt was advised to stop taking Metformin and was given Invokamet in its place- [facility] advising pt

## 2023-05-08 NOTE — TELEPHONE ENCOUNTER
Pt returned call and she states that she has lost her prescription drug coverage until November and she can't afford the Invokamet at this time.  She states that is why she asked to have the Metformin sent to her pharmacy because she can afford that

## 2023-05-11 DIAGNOSIS — E11.9 TYPE 2 DIABETES MELLITUS WITHOUT COMPLICATION, WITHOUT LONG-TERM CURRENT USE OF INSULIN (HCC): ICD-10-CM

## 2023-05-11 RX ORDER — METFORMIN HYDROCHLORIDE 500 MG/1
TABLET, EXTENDED RELEASE ORAL
Qty: 120 TABLET | Refills: 2 | Status: SHIPPED | OUTPATIENT
Start: 2023-05-11

## 2023-05-11 RX ORDER — GLIMEPIRIDE 4 MG/1
TABLET ORAL
Qty: 90 TABLET | Refills: 2 | Status: SHIPPED | OUTPATIENT
Start: 2023-05-11

## 2023-05-11 NOTE — PROGRESS NOTES
No current INS  Cannot afford invokamet  or trulicity until insurance back  In force in Nov 2023    Metformin and amaryl refills

## 2023-05-30 ENCOUNTER — TELEPHONE (OUTPATIENT)
Dept: PRIMARY CARE CLINIC | Age: 68
End: 2023-05-30

## 2023-05-30 DIAGNOSIS — B96.89 ACUTE BACTERIAL SINUSITIS: Primary | ICD-10-CM

## 2023-05-30 DIAGNOSIS — J01.90 ACUTE BACTERIAL SINUSITIS: Primary | ICD-10-CM

## 2023-05-30 RX ORDER — AZITHROMYCIN 250 MG/1
TABLET, FILM COATED ORAL
Qty: 6 TABLET | Refills: 0 | Status: SHIPPED | OUTPATIENT
Start: 2023-05-30 | End: 2023-06-09

## 2023-06-26 ENCOUNTER — HOSPITAL ENCOUNTER (OUTPATIENT)
Dept: WOMENS IMAGING | Age: 68
Discharge: HOME | End: 2023-06-26
Attending: FAMILY MEDICINE
Payer: MEDICARE

## 2023-06-26 DIAGNOSIS — Z12.31 BREAST CANCER SCREENING BY MAMMOGRAM: ICD-10-CM

## 2023-06-26 DIAGNOSIS — Z12.31 ENCOUNTER FOR MAMMOGRAM TO ESTABLISH BASELINE MAMMOGRAM: ICD-10-CM

## 2023-06-26 PROCEDURE — 77063 BREAST TOMOSYNTHESIS BI: CPT

## 2023-07-10 ENCOUNTER — NURSE ONLY (OUTPATIENT)
Dept: CARDIOLOGY CLINIC | Age: 68
End: 2023-07-10

## 2023-07-14 DIAGNOSIS — I10 PRIMARY HYPERTENSION: ICD-10-CM

## 2023-07-14 RX ORDER — VALSARTAN AND HYDROCHLOROTHIAZIDE 320; 25 MG/1; MG/1
TABLET, FILM COATED ORAL
Qty: 90 TABLET | Refills: 1 | Status: SHIPPED | OUTPATIENT
Start: 2023-07-14

## 2023-07-14 RX ORDER — VALSARTAN AND HYDROCHLOROTHIAZIDE 320; 25 MG/1; MG/1
1 TABLET, FILM COATED ORAL EVERY MORNING
Qty: 90 TABLET | Refills: 0 | Status: SHIPPED | OUTPATIENT
Start: 2023-07-14

## 2023-07-14 NOTE — TELEPHONE ENCOUNTER
Medication:   Requested Prescriptions     Pending Prescriptions Disp Refills    valsartan-hydroCHLOROthiazide (DIOVAN-HCT) 320-25 MG per tablet 90 tablet 0     Sig: Take 1 tablet by mouth every morning        Last Filled:      Patient Phone Number: [phone] (home) [phone] (work)    Last appt: 3/1/2023   Next appt: 9/20/2023    Last OARRS:   RX Monitoring 7/28/2015   Periodic Controlled Substance Monitoring No signs of potential drug abuse or diversion identified.

## 2023-07-14 NOTE — TELEPHONE ENCOUNTER
Medication:   Requested Prescriptions     Pending Prescriptions Disp Refills    valsartan-hydroCHLOROthiazide (DIOVAN-HCT) 320-25 MG per tablet [Pharmacy Med Name: VALSARTAN/HCTZ 320MG/25MG TABLETS] 90 tablet 1     Sig: TAKE 1 TABLET BY MOUTH DAILY        Last Filled:      Patient Phone Number: [phone] (home) [phone] (work)    Last appt: 3/1/2023   Next appt: 9/20/2023    Last OARRS:   RX Monitoring 7/28/2015   Periodic Controlled Substance Monitoring No signs of potential drug abuse or diversion identified.

## 2023-07-31 DIAGNOSIS — I10 ESSENTIAL HYPERTENSION: ICD-10-CM

## 2023-07-31 RX ORDER — ATORVASTATIN CALCIUM 20 MG/1
TABLET, FILM COATED ORAL
Qty: 90 TABLET | Refills: 0 | Status: SHIPPED | OUTPATIENT
Start: 2023-07-31

## 2023-07-31 NOTE — TELEPHONE ENCOUNTER
Medication:   Requested Prescriptions     Pending Prescriptions Disp Refills    atorvastatin (LIPITOR) 20 MG tablet [Pharmacy Med Name: ATORVASTATIN 20 MG TABLET] 90 tablet      Sig: TAKE 1 TABLET BY MOUTH EVERYDAY AT BEDTIME        Last Filled:      Patient Phone Number: [phone] (home) [phone] (work)    Last appt: 3/1/2023   Next appt: 9/20/2023    Last OARRS:   RX Monitoring 7/28/2015   Periodic Controlled Substance Monitoring No signs of potential drug abuse or diversion identified.

## 2023-08-11 RX ORDER — CLONIDINE HYDROCHLORIDE 0.2 MG/1
TABLET ORAL
Qty: 180 TABLET | Refills: 1 | Status: SHIPPED | OUTPATIENT
Start: 2023-08-11

## 2023-08-11 NOTE — TELEPHONE ENCOUNTER
Medication:   Requested Prescriptions     Pending Prescriptions Disp Refills    cloNIDine (CATAPRES) 0.2 MG tablet [Pharmacy Med Name: CLONIDINE 0.2MG TABLETS] 180 tablet 1     Sig: TAKE 1 TABLET BY MOUTH TWICE DAILY        Last Filled:      Patient Phone Number: [phone] (home) [phone] (work)    Last appt: 3/1/2023   Next appt: 9/20/2023    Last OARRS:   RX Monitoring 7/28/2015   Periodic Controlled Substance Monitoring No signs of potential drug abuse or diversion identified.

## 2023-08-14 ENCOUNTER — TELEPHONE (OUTPATIENT)
Dept: CARDIOLOGY CLINIC | Age: 68
End: 2023-08-14

## 2023-08-14 NOTE — TELEPHONE ENCOUNTER
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 Form filled out and signed by Dr. Keegan Muñiz.

## 2023-08-25 NOTE — PROGRESS NOTES
tablet TAKE 1 TABLET BY MOUTH EVERY DAY Yes Tamara Russo MD   docusate sodium (COLACE) 100 MG capsule Take 1 capsule by mouth 2 times daily as needed for Constipation Yes Tamara Russo MD   ondansetron (ZOFRAN) 4 MG tablet Take 1 tablet by mouth 3 times daily as needed for Nausea or Vomiting Yes Sofi Martínez MD   metroNIDAZOLE (METROCREAM) 0.75 % cream APPLY TO THE FACE 1 TO 2 TIMES DAILY FOR ROSACEA. Yes Bebeto Blackwell MD   blood glucose test strips (TRUE METRIX BLOOD GLUCOSE TEST) strip Test glucose  ONCE a day Yes Tamara Russo MD   albuterol sulfate HFA (PROVENTIL HFA) 108 (90 Base) MCG/ACT inhaler Inhale 2 puffs into the lungs every 6 hours as needed for Wheezing Yes Tamara Russo MD   estradiol (ESTRING) 2 MG vaginal ring Place 2 mg vaginally Yes Nba Jesus MD   tacrolimus (PROTOPIC) 0.1 % ointment Apply topically to the face 2 times daily if needed for rosacea. Yes Bebeto Blackwell MD   clobetasol (TEMOVATE) 0.05 % ointment Apply to affected area on the left thigh twice daily for up to 2 weeks or until improved.  Yes Bebeto Blackwell MD   mometasone (NASONEX) 50 MCG/ACT nasal spray SHAKE WELL AND USE 2 Lynnann Marlene Yes Tamara Russo MD        Past Medical History:  Past Medical History:   Diagnosis Date    Complete heart block ([address]) 4/23/2021    Diabetes mellitus (HCC)     GERD (gastroesophageal reflux disease)     Hyperlipidemia     Hypertension     Obesity     S/P hip replacement     Sleep apnea        Past Surgical History:   Past Surgical History:   Procedure Laterality Date    COLONOSCOPY  9/28/2021    COLONOSCOPY POLYPECTOMY SNARE/COLD BIOPSY performed by Cody Eric MD at [address] Left Oct '13    UPPER GASTROINTESTINAL ENDOSCOPY N/A 9/28/2021    EGD BIOPSY performed by Cody Eric MD at [address] 10/13/2021    ESOPHAGOGASTRODUODENOSCOPY RADIOFREQUENCY ABLATION performed by

## 2023-08-26 DIAGNOSIS — K21.9 GASTROESOPHAGEAL REFLUX DISEASE WITHOUT ESOPHAGITIS: ICD-10-CM

## 2023-08-28 RX ORDER — PANTOPRAZOLE SODIUM 40 MG/1
TABLET, DELAYED RELEASE ORAL
Qty: 90 TABLET | Refills: 0 | Status: SHIPPED | OUTPATIENT
Start: 2023-08-28

## 2023-08-28 NOTE — TELEPHONE ENCOUNTER
Medication:   Requested Prescriptions     Pending Prescriptions Disp Refills    pantoprazole (PROTONIX) 40 MG tablet [Pharmacy Med Name: PANTOPRAZOLE 40MG TABLETS] 90 tablet 2     Sig: TAKE 1 TABLET BY MOUTH DAILY        Last Filled:      Patient Phone Number: [phone] (home) [phone] (work)    Last appt: 3/1/2023   Next appt: 9/21/2023    Last OARRS:   RX Monitoring 7/28/2015   Periodic Controlled Substance Monitoring No signs of potential drug abuse or diversion identified.

## 2023-08-29 ENCOUNTER — NURSE ONLY (OUTPATIENT)
Dept: CARDIOLOGY CLINIC | Age: 68
End: 2023-08-29
Payer: MEDICARE

## 2023-08-29 ENCOUNTER — OFFICE VISIT (OUTPATIENT)
Dept: CARDIOLOGY CLINIC | Age: 68
End: 2023-08-29
Payer: MEDICARE

## 2023-08-29 VITALS
SYSTOLIC BLOOD PRESSURE: 124 MMHG | HEART RATE: 93 BPM | OXYGEN SATURATION: 96 % | BODY MASS INDEX: 42.17 KG/M2 | DIASTOLIC BLOOD PRESSURE: 70 MMHG | WEIGHT: 247 LBS | HEIGHT: 64 IN

## 2023-08-29 DIAGNOSIS — I44.2 COMPLETE HEART BLOCK (HCC): Primary | ICD-10-CM

## 2023-08-29 DIAGNOSIS — I44.2 COMPLETE HEART BLOCK (HCC): ICD-10-CM

## 2023-08-29 DIAGNOSIS — Z95.0 PACEMAKER: ICD-10-CM

## 2023-08-29 DIAGNOSIS — I44.2 CHB (COMPLETE HEART BLOCK) (HCC): ICD-10-CM

## 2023-08-29 DIAGNOSIS — I10 PRIMARY HYPERTENSION: ICD-10-CM

## 2023-08-29 DIAGNOSIS — Z95.0 PACEMAKER: Primary | ICD-10-CM

## 2023-08-29 DIAGNOSIS — I47.1 PAT (PAROXYSMAL ATRIAL TACHYCARDIA) (HCC): ICD-10-CM

## 2023-08-29 PROCEDURE — 3017F COLORECTAL CA SCREEN DOC REV: CPT | Performed by: NURSE PRACTITIONER

## 2023-08-29 PROCEDURE — G8427 DOCREV CUR MEDS BY ELIG CLIN: HCPCS | Performed by: NURSE PRACTITIONER

## 2023-08-29 PROCEDURE — 1090F PRES/ABSN URINE INCON ASSESS: CPT | Performed by: NURSE PRACTITIONER

## 2023-08-29 PROCEDURE — 99214 OFFICE O/P EST MOD 30 MIN: CPT | Performed by: NURSE PRACTITIONER

## 2023-08-29 PROCEDURE — G8417 CALC BMI ABV UP PARAM F/U: HCPCS | Performed by: NURSE PRACTITIONER

## 2023-08-29 PROCEDURE — 93280 PM DEVICE PROGR EVAL DUAL: CPT | Performed by: INTERNAL MEDICINE

## 2023-08-29 PROCEDURE — 1036F TOBACCO NON-USER: CPT | Performed by: NURSE PRACTITIONER

## 2023-08-29 PROCEDURE — G8399 PT W/DXA RESULTS DOCUMENT: HCPCS | Performed by: NURSE PRACTITIONER

## 2023-08-29 PROCEDURE — 3078F DIAST BP <80 MM HG: CPT | Performed by: NURSE PRACTITIONER

## 2023-08-29 PROCEDURE — 3074F SYST BP LT 130 MM HG: CPT | Performed by: NURSE PRACTITIONER

## 2023-08-29 PROCEDURE — 93000 ELECTROCARDIOGRAM COMPLETE: CPT | Performed by: NURSE PRACTITIONER

## 2023-08-29 PROCEDURE — 1123F ACP DISCUSS/DSCN MKR DOCD: CPT | Performed by: NURSE PRACTITIONER

## 2023-08-29 ASSESSMENT — ENCOUNTER SYMPTOMS
SHORTNESS OF BREATH: 1
COUGH: 0
SINUS PRESSURE: 0
NAUSEA: 0
WHEEZING: 0
COLOR CHANGE: 0
BACK PAIN: 0
CONSTIPATION: 0
TROUBLE SWALLOWING: 0
VOMITING: 0
ABDOMINAL PAIN: 0
DIARRHEA: 0
BLOOD IN STOOL: 0
SORE THROAT: 0

## 2023-09-05 PROCEDURE — 93294 REM INTERROG EVL PM/LDLS PM: CPT | Performed by: INTERNAL MEDICINE

## 2023-09-05 PROCEDURE — 93296 REM INTERROG EVL PM/IDS: CPT | Performed by: INTERNAL MEDICINE

## 2023-09-21 ENCOUNTER — OFFICE VISIT (OUTPATIENT)
Dept: PRIMARY CARE CLINIC | Age: 68
End: 2023-09-21

## 2023-09-21 VITALS
HEART RATE: 92 BPM | BODY MASS INDEX: 42.74 KG/M2 | OXYGEN SATURATION: 98 % | TEMPERATURE: 97.2 F | DIASTOLIC BLOOD PRESSURE: 82 MMHG | WEIGHT: 249 LBS | SYSTOLIC BLOOD PRESSURE: 147 MMHG

## 2023-09-21 DIAGNOSIS — E11.65 TYPE 2 DIABETES MELLITUS WITH HYPERGLYCEMIA, WITHOUT LONG-TERM CURRENT USE OF INSULIN (HCC): Primary | Chronic | ICD-10-CM

## 2023-09-21 DIAGNOSIS — E11.9 TYPE 2 DIABETES MELLITUS WITHOUT COMPLICATION, UNSPECIFIED WHETHER LONG TERM INSULIN USE (HCC): ICD-10-CM

## 2023-09-21 DIAGNOSIS — E11.59 OBESITY, DIABETES, AND HYPERTENSION SYNDROME (HCC): ICD-10-CM

## 2023-09-21 DIAGNOSIS — I15.2 OBESITY, DIABETES, AND HYPERTENSION SYNDROME (HCC): ICD-10-CM

## 2023-09-21 DIAGNOSIS — K21.9 GASTROESOPHAGEAL REFLUX DISEASE WITHOUT ESOPHAGITIS: ICD-10-CM

## 2023-09-21 DIAGNOSIS — E78.5 TYPE 2 DIABETES MELLITUS WITH HYPERLIPIDEMIA (HCC): ICD-10-CM

## 2023-09-21 DIAGNOSIS — E11.69 OBESITY, DIABETES, AND HYPERTENSION SYNDROME (HCC): ICD-10-CM

## 2023-09-21 DIAGNOSIS — I10 DIABETES MELLITUS WITH COINCIDENT HYPERTENSION (HCC): Chronic | ICD-10-CM

## 2023-09-21 DIAGNOSIS — I44.2 COMPLETE HEART BLOCK (HCC): Chronic | ICD-10-CM

## 2023-09-21 DIAGNOSIS — E11.69 TYPE 2 DIABETES MELLITUS WITH HYPERLIPIDEMIA (HCC): ICD-10-CM

## 2023-09-21 DIAGNOSIS — E66.9 OBESITY, DIABETES, AND HYPERTENSION SYNDROME (HCC): ICD-10-CM

## 2023-09-21 DIAGNOSIS — E11.9 DIABETES MELLITUS WITH COINCIDENT HYPERTENSION (HCC): Chronic | ICD-10-CM

## 2023-09-21 DIAGNOSIS — M05.9 RHEUMATOID ARTHRITIS WITH RHEUMATOID FACTOR, UNSPECIFIED (HCC): ICD-10-CM

## 2023-09-21 PROBLEM — M06.9 RHEUMATOID ARTHRITIS, UNSPECIFIED (HCC): Status: RESOLVED | Noted: 2022-02-15 | Resolved: 2023-09-21

## 2023-09-21 PROBLEM — M67.441 GANGLION CYST OF FINGER OF RIGHT HAND: Status: RESOLVED | Noted: 2019-10-08 | Resolved: 2023-09-21

## 2023-09-21 LAB — HBA1C MFR BLD: 7.6 %

## 2023-09-21 RX ORDER — GLIMEPIRIDE 4 MG/1
TABLET ORAL
Qty: 90 TABLET | Refills: 2 | Status: SHIPPED | OUTPATIENT
Start: 2023-09-21

## 2023-09-21 RX ORDER — PANTOPRAZOLE SODIUM 40 MG/1
40 TABLET, DELAYED RELEASE ORAL DAILY
Qty: 90 TABLET | Refills: 2 | Status: SHIPPED | OUTPATIENT
Start: 2023-09-21

## 2023-09-21 RX ORDER — ATORVASTATIN CALCIUM 20 MG/1
20 TABLET, FILM COATED ORAL
Qty: 90 TABLET | Refills: 2 | Status: SHIPPED | OUTPATIENT
Start: 2023-09-21

## 2023-09-21 RX ORDER — VALSARTAN AND HYDROCHLOROTHIAZIDE 320; 25 MG/1; MG/1
1 TABLET, FILM COATED ORAL DAILY
Qty: 90 TABLET | Refills: 2 | Status: SHIPPED | OUTPATIENT
Start: 2023-09-21

## 2023-09-21 RX ORDER — CLONIDINE HYDROCHLORIDE 0.2 MG/1
0.2 TABLET ORAL 2 TIMES DAILY
Qty: 180 TABLET | Refills: 2 | Status: SHIPPED | OUTPATIENT
Start: 2023-09-21

## 2023-09-21 RX ORDER — CALCIUM CITRATE/VITAMIN D3 200MG-6.25
TABLET ORAL
Qty: 200 EACH | Refills: 5 | Status: SHIPPED | OUTPATIENT
Start: 2023-09-21

## 2023-09-21 RX ORDER — METFORMIN HYDROCHLORIDE 500 MG/1
TABLET, EXTENDED RELEASE ORAL
Qty: 360 TABLET | Refills: 2 | Status: SHIPPED | OUTPATIENT
Start: 2023-09-21

## 2023-09-21 RX ORDER — AMLODIPINE BESYLATE 5 MG/1
TABLET ORAL
Qty: 90 TABLET | Refills: 2 | Status: SHIPPED | OUTPATIENT
Start: 2023-09-21

## 2023-09-21 SDOH — HEALTH STABILITY: PHYSICAL HEALTH: ON AVERAGE, HOW MANY MINUTES DO YOU ENGAGE IN EXERCISE AT THIS LEVEL?: 0 MIN

## 2023-09-21 SDOH — HEALTH STABILITY: PHYSICAL HEALTH: ON AVERAGE, HOW MANY DAYS PER WEEK DO YOU ENGAGE IN MODERATE TO STRENUOUS EXERCISE (LIKE A BRISK WALK)?: 0 DAYS

## 2023-09-21 NOTE — PROGRESS NOTES
(PROTONIX) 40 MG tablet    Complete heart block (HCC)    Relevant Medications    cloNIDine (CATAPRES) 0.2 MG tablet    amLODIPine (NORVASC) 5 MG tablet    valsartan-hydroCHLOROthiazide (DIOVAN-HCT) 320-25 MG per tablet    atorvastatin (LIPITOR) 20 MG tablet    Rheumatoid arthritis with rheumatoid factor, unspecified    Relevant Medications    oxaprozin (DAYPRO) 600 MG tablet    Obesity, diabetes, and hypertension syndrome (HCC)    Relevant Medications    metFORMIN (GLUCOPHAGE-XR) 500 MG extended release tablet    cloNIDine (CATAPRES) 0.2 MG tablet    amLODIPine (NORVASC) 5 MG tablet    valsartan-hydroCHLOROthiazide (DIOVAN-HCT) 320-25 MG per tablet    glimepiride (AMARYL) 4 MG tablet    Type 2 diabetes mellitus with hyperlipidemia (HCC)    Relevant Medications    metFORMIN (GLUCOPHAGE-XR) 500 MG extended release tablet    cloNIDine (CATAPRES) 0.2 MG tablet    amLODIPine (NORVASC) 5 MG tablet    valsartan-hydroCHLOROthiazide (DIOVAN-HCT) 320-25 MG per tablet    glimepiride (AMARYL) 4 MG tablet    atorvastatin (LIPITOR) 20 MG tablet    Other Relevant Orders    Lipid Panel    Diabetes mellitus with coincident hypertension (HCC)    Relevant Medications    metFORMIN (GLUCOPHAGE-XR) 500 MG extended release tablet    glimepiride (AMARYL) 4 MG tablet    Other Relevant Orders    Comprehensive Metabolic Panel     Orders Placed This Encounter   Procedures    Influenza, FLUAD, (age 72 y+), IM, Preservative Free, 0.5 mL    Comprehensive Metabolic Panel     Standing Status:   Future     Standing Expiration Date:   9/21/2024    Lipid Panel     Standing Status:   Future     Standing Expiration Date:   9/21/2024    POCT glycosylated hemoglobin (Hb A1C)       Return in about 3 months (around 12/21/2023) for diabetic check/visit.         Dr. Juliet Campos and OMT  [address] [facility]        Usual doctor's hours are:       Monday 7:00 am to 5:30 pm  Wednesday 7:00 am to 4:30 pm  Thursday 7:00 am to 4:30

## 2023-10-12 DIAGNOSIS — E11.69 OBESITY, DIABETES, AND HYPERTENSION SYNDROME (HCC): ICD-10-CM

## 2023-10-12 DIAGNOSIS — E66.9 OBESITY, DIABETES, AND HYPERTENSION SYNDROME (HCC): ICD-10-CM

## 2023-10-12 DIAGNOSIS — I15.2 OBESITY, DIABETES, AND HYPERTENSION SYNDROME (HCC): ICD-10-CM

## 2023-10-12 DIAGNOSIS — E11.59 OBESITY, DIABETES, AND HYPERTENSION SYNDROME (HCC): ICD-10-CM

## 2023-10-12 RX ORDER — VALSARTAN AND HYDROCHLOROTHIAZIDE 320; 25 MG/1; MG/1
1 TABLET, FILM COATED ORAL DAILY
Qty: 90 TABLET | Refills: 2 | Status: SHIPPED | OUTPATIENT
Start: 2023-10-12

## 2023-10-12 NOTE — TELEPHONE ENCOUNTER
Patient requesting a medication refill.   Pharmacy: [facility]  Next office visit: 12/21/2023  Last regular office visit: 9/21/2023

## 2023-10-19 ENCOUNTER — TELEPHONE (OUTPATIENT)
Dept: PRIMARY CARE CLINIC | Age: 68
End: 2023-10-19

## 2023-10-19 NOTE — TELEPHONE ENCOUNTER
Patient called asking for refill on to be sent to pharmacy below please; pt has been out of medication for 2 weeks   valsartan-hydroCHLOROthiazide (DIOVAN-HCT) 320-25 MG per tablet    [id number]- [place], [place] - [address] [facility] [address] [facility] [address]  JUAN PERALTA. Abhishek Prescott [phone] - F [phone]    Please advise

## 2023-11-03 DIAGNOSIS — E11.69 TYPE 2 DIABETES MELLITUS WITH HYPERLIPIDEMIA (HCC): ICD-10-CM

## 2023-11-03 DIAGNOSIS — E78.5 TYPE 2 DIABETES MELLITUS WITH HYPERLIPIDEMIA (HCC): ICD-10-CM

## 2023-11-03 NOTE — TELEPHONE ENCOUNTER
Medication:   Requested Prescriptions     Pending Prescriptions Disp Refills    atorvastatin (LIPITOR) 20 MG tablet [Pharmacy Med Name: ATORVASTATIN 20 MG TABLET] 90 tablet 2     Sig: TAKE 1 TABLET BY MOUTH EVERYDAY AT BEDTIME        Last Filled:      Patient Phone Number: [phone] (home) [phone] (work)    Last appt: 9/21/2023   Next appt: 12/21/2023    Last OARRS:       7/28/2015     8:23 AM   RX Monitoring   Periodic Controlled Substance Monitoring No signs of potential drug abuse or diversion identified.

## 2023-11-06 RX ORDER — ATORVASTATIN CALCIUM 20 MG/1
20 TABLET, FILM COATED ORAL
Qty: 90 TABLET | Refills: 2 | Status: SHIPPED | OUTPATIENT
Start: 2023-11-06

## 2023-11-16 DIAGNOSIS — E11.59 OBESITY, DIABETES, AND HYPERTENSION SYNDROME (HCC): ICD-10-CM

## 2023-11-16 DIAGNOSIS — I15.2 OBESITY, DIABETES, AND HYPERTENSION SYNDROME (HCC): ICD-10-CM

## 2023-11-16 DIAGNOSIS — E11.69 OBESITY, DIABETES, AND HYPERTENSION SYNDROME (HCC): ICD-10-CM

## 2023-11-16 DIAGNOSIS — E66.9 OBESITY, DIABETES, AND HYPERTENSION SYNDROME (HCC): ICD-10-CM

## 2023-11-16 RX ORDER — VALSARTAN AND HYDROCHLOROTHIAZIDE 320; 25 MG/1; MG/1
1 TABLET, FILM COATED ORAL DAILY
Qty: 90 TABLET | Refills: 1 | Status: SHIPPED | OUTPATIENT
Start: 2023-11-16

## 2023-12-05 PROCEDURE — 93296 REM INTERROG EVL PM/IDS: CPT | Performed by: INTERNAL MEDICINE

## 2023-12-05 PROCEDURE — 93294 REM INTERROG EVL PM/LDLS PM: CPT | Performed by: INTERNAL MEDICINE

## 2024-01-30 ENCOUNTER — TELEPHONE (OUTPATIENT)
Dept: CARDIOLOGY CLINIC | Age: 69
End: 2024-01-30

## 2024-01-30 NOTE — TELEPHONE ENCOUNTER
Dr. 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    Ok to send letter stating below.    Trino Weller has a cardiac history of paroxysmal atrial tachycardia, complete heart block with implantation of dual chamber pacemaker on 04/27/2021.  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       History  Patient was last seen in office on 08/29/2023 by JANIA Reese.  Device interrogation showed 13 episodes, longest 3 minutes, burden <0.1%     Please advise.    Thanks,  Petty Hernandez RN

## 2024-01-30 NOTE — TELEPHONE ENCOUNTER
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    Please advise.    Trino' callback: [phone]

## 2024-02-23 DIAGNOSIS — E11.65 TYPE 2 DIABETES MELLITUS WITH HYPERGLYCEMIA, WITHOUT LONG-TERM CURRENT USE OF INSULIN (HCC): Chronic | ICD-10-CM

## 2024-02-23 RX ORDER — GLIMEPIRIDE 4 MG/1
TABLET ORAL
Qty: 90 TABLET | Refills: 2 | Status: SHIPPED | OUTPATIENT
Start: 2024-02-23

## 2024-02-23 NOTE — TELEPHONE ENCOUNTER
Medication:   Requested Prescriptions     Pending Prescriptions Disp Refills    glimepiride (AMARYL) 4 MG tablet 90 tablet 2     Sig: TAKE 1 TABLET EVERY MORNING      Last appt: 9/21/2023

## 2024-02-26 ENCOUNTER — TELEPHONE (OUTPATIENT)
Dept: DERMATOLOGY | Age: 69
End: 2024-02-26

## 2024-02-26 NOTE — TELEPHONE ENCOUNTER
Pt calling to schedule appt with  last OV was in 2022 2023 no show appt I offered pt appt for 2/27 she declined she got short with me throughout the phone call and conversation

## 2024-02-29 ENCOUNTER — OFFICE VISIT (OUTPATIENT)
Dept: DERMATOLOGY | Age: 69
End: 2024-02-29
Payer: MEDICARE

## 2024-02-29 DIAGNOSIS — L72.0 EPIDERMOID CYST: Primary | ICD-10-CM

## 2024-02-29 DIAGNOSIS — L71.9 ROSACEA: ICD-10-CM

## 2024-02-29 PROCEDURE — G8399 PT W/DXA RESULTS DOCUMENT: HCPCS | Performed by: DERMATOLOGY

## 2024-02-29 PROCEDURE — 3017F COLORECTAL CA SCREEN DOC REV: CPT | Performed by: DERMATOLOGY

## 2024-02-29 PROCEDURE — G8484 FLU IMMUNIZE NO ADMIN: HCPCS | Performed by: DERMATOLOGY

## 2024-02-29 PROCEDURE — 99213 OFFICE O/P EST LOW 20 MIN: CPT | Performed by: DERMATOLOGY

## 2024-02-29 PROCEDURE — G8427 DOCREV CUR MEDS BY ELIG CLIN: HCPCS | Performed by: DERMATOLOGY

## 2024-02-29 PROCEDURE — 1036F TOBACCO NON-USER: CPT | Performed by: DERMATOLOGY

## 2024-02-29 PROCEDURE — 1090F PRES/ABSN URINE INCON ASSESS: CPT | Performed by: DERMATOLOGY

## 2024-02-29 PROCEDURE — 1123F ACP DISCUSS/DSCN MKR DOCD: CPT | Performed by: DERMATOLOGY

## 2024-02-29 PROCEDURE — G8417 CALC BMI ABV UP PARAM F/U: HCPCS | Performed by: DERMATOLOGY

## 2024-02-29 RX ORDER — MINOCYCLINE HYDROCHLORIDE 50 MG/1
50 CAPSULE ORAL 2 TIMES DAILY
Qty: 60 CAPSULE | Refills: 1 | Status: SHIPPED | OUTPATIENT
Start: 2024-02-29

## 2024-02-29 NOTE — PROGRESS NOTES
[facility]  Jose Alberto Knight MD  [phone]      Trino Weller  1955    68 y.o. female     Date of Visit: 2/29/2024    Chief Complaint: rosacea, skin lesion    History of Present Illness:    1.  She reports a newly noted persistent lesion on the left lower neck.    2.  She has a history of chronic acne rosacea-improved with metronidazole 0.75% cream and minocycline 50 mg twice daily for 2 to 4 weeks for major flares.    Has a pacemaker.      Review of Systems:  Gen: Feels well, good sense of health.    Past Medical History, Family History, Surgical History, Medications and Allergies reviewed.    Past Medical History:   Diagnosis Date    Complete heart block (HCC) 4/23/2021    Diabetes mellitus (HCC)     GERD (gastroesophageal reflux disease)     Hyperlipidemia     Hypertension     Obesity     S/P hip replacement     Sleep apnea      Past Surgical History:   Procedure Laterality Date    COLONOSCOPY  9/28/2021    COLONOSCOPY POLYPECTOMY SNARE/COLD BIOPSY performed by Shanelle Byrd MD at [facility] ENDOSCOPY    JOINT REPLACEMENT      TOTAL HIP ARTHROPLASTY Left Oct '13    UPPER GASTROINTESTINAL ENDOSCOPY N/A 9/28/2021    EGD BIOPSY performed by Shanelle Byrd MD at [facility] ENDOSCOPY    UPPER GASTROINTESTINAL ENDOSCOPY N/A 10/13/2021    ESOPHAGOGASTRODUODENOSCOPY RADIOFREQUENCY ABLATION performed by Shanelle Byrd MD at [facility] ENDOSCOPY    UPPER GASTROINTESTINAL ENDOSCOPY N/A 10/13/2021    EGD BIOPSY performed by Shanelle Byrd MD at [facility] ENDOSCOPY       Allergies   Allergen Reactions    Codeine Itching    Penicillins Itching     Outpatient Medications Marked as Taking for the 2/29/24 encounter (Office Visit) with Jose Alberto Knight MD   Medication Sig Dispense Refill    metroNIDAZOLE (METROCREAM) 0.75 % cream Apply to the face 1 to 2 times daily for rosacea. 45 g 2    minocycline (MINOCIN;DYNACIN) 50 MG capsule Take 1 capsule by mouth 2 times daily 60 capsule 1    glimepiride (AMARYL) 4 MG tablet TAKE 1

## 2024-03-05 PROCEDURE — 93294 REM INTERROG EVL PM/LDLS PM: CPT | Performed by: INTERNAL MEDICINE

## 2024-03-05 PROCEDURE — 93296 REM INTERROG EVL PM/IDS: CPT | Performed by: INTERNAL MEDICINE

## 2024-03-12 ENCOUNTER — TELEPHONE (OUTPATIENT)
Dept: CARDIOLOGY CLINIC | Age: 69
End: 2024-03-12

## 2024-03-12 NOTE — TELEPHONE ENCOUNTER
Trino called in stating he has to take an emergent flight this Thursday for a  but she has misplaced her card for the airline metal detectors to let them know she has pacemaker. Trino would like to know how to obtain a replacement card and/or if there can be a letter drawn up about her having a pacemaker. Trino states she can  in office but she will be leaving Thursday evening.    Please advise    Trino's callback: [phone]

## 2024-03-12 NOTE — TELEPHONE ENCOUNTER
Spoke to pt, informed I have printed off their implant record and can  whenever. Also informed pt to reach out to [employer] to get replacement card. Pt v/u.

## 2024-03-21 ENCOUNTER — OFFICE VISIT (OUTPATIENT)
Dept: SLEEP MEDICINE | Age: 69
End: 2024-03-21
Payer: MEDICARE

## 2024-03-21 VITALS
OXYGEN SATURATION: 96 % | RESPIRATION RATE: 18 BRPM | BODY MASS INDEX: 41.04 KG/M2 | WEIGHT: 240.4 LBS | DIASTOLIC BLOOD PRESSURE: 80 MMHG | TEMPERATURE: 98.1 F | SYSTOLIC BLOOD PRESSURE: 136 MMHG | HEART RATE: 84 BPM | HEIGHT: 64 IN

## 2024-03-21 DIAGNOSIS — I15.2 OBESITY, DIABETES, AND HYPERTENSION SYNDROME (HCC): ICD-10-CM

## 2024-03-21 DIAGNOSIS — Z99.89 DEPENDENCE ON OTHER ENABLING MACHINES AND DEVICES: ICD-10-CM

## 2024-03-21 DIAGNOSIS — G47.33 OSA ON CPAP: Primary | ICD-10-CM

## 2024-03-21 DIAGNOSIS — E11.59 OBESITY, DIABETES, AND HYPERTENSION SYNDROME (HCC): ICD-10-CM

## 2024-03-21 DIAGNOSIS — E11.69 OBESITY, DIABETES, AND HYPERTENSION SYNDROME (HCC): ICD-10-CM

## 2024-03-21 DIAGNOSIS — E66.9 OBESITY, DIABETES, AND HYPERTENSION SYNDROME (HCC): ICD-10-CM

## 2024-03-21 PROCEDURE — G8399 PT W/DXA RESULTS DOCUMENT: HCPCS | Performed by: PSYCHIATRY & NEUROLOGY

## 2024-03-21 PROCEDURE — G8484 FLU IMMUNIZE NO ADMIN: HCPCS | Performed by: PSYCHIATRY & NEUROLOGY

## 2024-03-21 PROCEDURE — 1090F PRES/ABSN URINE INCON ASSESS: CPT | Performed by: PSYCHIATRY & NEUROLOGY

## 2024-03-21 PROCEDURE — G8427 DOCREV CUR MEDS BY ELIG CLIN: HCPCS | Performed by: PSYCHIATRY & NEUROLOGY

## 2024-03-21 PROCEDURE — G8417 CALC BMI ABV UP PARAM F/U: HCPCS | Performed by: PSYCHIATRY & NEUROLOGY

## 2024-03-21 PROCEDURE — 1036F TOBACCO NON-USER: CPT | Performed by: PSYCHIATRY & NEUROLOGY

## 2024-03-21 PROCEDURE — 3079F DIAST BP 80-89 MM HG: CPT | Performed by: PSYCHIATRY & NEUROLOGY

## 2024-03-21 PROCEDURE — 1123F ACP DISCUSS/DSCN MKR DOCD: CPT | Performed by: PSYCHIATRY & NEUROLOGY

## 2024-03-21 PROCEDURE — 3046F HEMOGLOBIN A1C LEVEL >9.0%: CPT | Performed by: PSYCHIATRY & NEUROLOGY

## 2024-03-21 PROCEDURE — 2022F DILAT RTA XM EVC RTNOPTHY: CPT | Performed by: PSYCHIATRY & NEUROLOGY

## 2024-03-21 PROCEDURE — 3075F SYST BP GE 130 - 139MM HG: CPT | Performed by: PSYCHIATRY & NEUROLOGY

## 2024-03-21 PROCEDURE — 3017F COLORECTAL CA SCREEN DOC REV: CPT | Performed by: PSYCHIATRY & NEUROLOGY

## 2024-03-21 PROCEDURE — 99214 OFFICE O/P EST MOD 30 MIN: CPT | Performed by: PSYCHIATRY & NEUROLOGY

## 2024-03-21 ASSESSMENT — SLEEP AND FATIGUE QUESTIONNAIRES
HOW LIKELY ARE YOU TO NOD OFF OR FALL ASLEEP IN A CAR, WHILE STOPPED FOR A FEW MINUTES IN TRAFFIC: WOULD NEVER DOZE
HOW LIKELY ARE YOU TO NOD OFF OR FALL ASLEEP WHEN YOU ARE A PASSENGER IN A CAR FOR AN HOUR WITHOUT A BREAK: WOULD NEVER DOZE
HOW LIKELY ARE YOU TO NOD OFF OR FALL ASLEEP WHILE SITTING INACTIVE IN A PUBLIC PLACE: WOULD NEVER DOZE
HOW LIKELY ARE YOU TO NOD OFF OR FALL ASLEEP WHILE WATCHING TV: WOULD NEVER DOZE
HOW LIKELY ARE YOU TO NOD OFF OR FALL ASLEEP WHILE LYING DOWN TO REST IN THE AFTERNOON WHEN CIRCUMSTANCES PERMIT: SLIGHT CHANCE OF DOZING
HOW LIKELY ARE YOU TO NOD OFF OR FALL ASLEEP WHILE SITTING AND TALKING TO SOMEONE: WOULD NEVER DOZE
HOW LIKELY ARE YOU TO NOD OFF OR FALL ASLEEP WHILE SITTING AND READING: WOULD NEVER DOZE
ESS TOTAL SCORE: 1
HOW LIKELY ARE YOU TO NOD OFF OR FALL ASLEEP WHILE SITTING QUIETLY AFTER LUNCH WITHOUT ALCOHOL: WOULD NEVER DOZE

## 2024-03-21 NOTE — PROGRESS NOTES
UPPER GASTROINTESTINAL ENDOSCOPY N/A 9/28/2021    EGD BIOPSY performed by Shanelle Byrd MD at [facility] ENDOSCOPY    UPPER GASTROINTESTINAL ENDOSCOPY N/A 10/13/2021    ESOPHAGOGASTRODUODENOSCOPY RADIOFREQUENCY ABLATION performed by Shanelle Byrd MD at [facility] ENDOSCOPY    UPPER GASTROINTESTINAL ENDOSCOPY N/A 10/13/2021    EGD BIOPSY performed by Shanelle Byrd MD at [facility] ENDOSCOPY       Family History   Problem Relation Age of Onset    Heart Disease Mother        Review of Systems    Objective:     Vitals:  Weight BMI Neck circumference    Wt Readings from Last 3 Encounters:   03/21/24 109 kg (240 lb 6.4 oz)   09/21/23 112.9 kg (249 lb)   08/29/23 112 kg (247 lb)    Body mass index is 41.24 kg/m².       BP HR SaO2   BP Readings from Last 3 Encounters:   03/21/24 136/80   09/21/23 (!) 147/82   08/29/23 124/70    Pulse Readings from Last 3 Encounters:   03/21/24 84   09/21/23 92   08/29/23 93    SpO2 Readings from Last 3 Encounters:   03/21/24 96%   09/21/23 98%   08/29/23 96%        Themandibular molar Class :   [x]1 []2 []3      Mallampati I Airway Classification:   []1 []2 []3 [x]4      Physical Exam  Vitals and nursing note reviewed.   Constitutional:       Appearance: Normal appearance. She is obese.   Cardiovascular:      Rate and Rhythm: Normal rate and regular rhythm.   Pulmonary:      Effort: Pulmonary effort is normal.      Breath sounds: Normal breath sounds.   Musculoskeletal:      Right lower leg: No edema.      Left lower leg: No edema.         :        Diagnosis Orders   1. VEL on CPAP        2. Dependence on other enabling machines and devices        3. Obesity, diabetes, and hypertension syndrome (HCC)            Assessment/Plan:   1. Moderate Obstructive sleep apnea syndrome syndrome   Assessment & Plan:  Chronic, stable, on PAP at 10.9 cmwp, I will continue the PAP at 8-12 cmwp.  I Encouraged the patient to use the machine on nightly basis, at least 4 hours a night.  I instructed the patient to adjust the

## 2024-03-21 NOTE — PROGRESS NOTES
Trino Weller         : 1955  [x] [facility]     [] [facility]      [] [facility]     [][facility]    [] [facility]  [] [facility]  [] [facility]   [] [facility] [] Other:  Diagnosis: [x] VEL (G47.33) [] CSA (G47.31) [] Apnea (G47.30)   Length of Need: [] 12 Months [x] 99 Months [] Other:    Machine (JASON!):  [x] [employer]Med AirSense     Auto [] Other:       Humidifier: [x] Heated ()        [x] Water chamber replacement ()/ 1 per 6 months        Mask:  Please always start with the mask the patient used during the titraion   [x] Nasal () /1 per 3 months    [x] Patient choice -Size and fit mask    [] Dispense:     [x] Headgear () / 1 per 6 months    [x] Replacement Nasal Cushion ()/2 per month             Tubing: [x] Heated ()/1 per 3 months    [] Standard ()/1 per 3 months [] Other:           Filters: [x] Non-disposable ()/1 per 6 months     [x] Ultra-Fine, Disposable ()/2 per month        Miscellaneous: [x] Chin Strap ()/ 1 per 6 months [] O2 bleed-in:       LPM   [] Oximetry on CPAP/Bilevel []  Other:          Start Order Date: 24    MEDICAL JUSTIFICATION:  I, the undersigned, certify that the above prescribed supplies are medically necessary for this patient’s wellbeing.  In my opinion, the supplies are both reasonable and necessary in reference to accepted standards of medicalpractice in treatment of this patient’s condition.    Joseluis Hays MD      NPI: [id number]       Order Signed Date: 24    Electronically signed by Joseluis Hays MD on 3/21/2024 at 1:27 PM

## 2024-05-22 DIAGNOSIS — E66.9 OBESITY, DIABETES, AND HYPERTENSION SYNDROME (HCC): ICD-10-CM

## 2024-05-22 DIAGNOSIS — E11.59 OBESITY, DIABETES, AND HYPERTENSION SYNDROME (HCC): ICD-10-CM

## 2024-05-22 DIAGNOSIS — E11.69 OBESITY, DIABETES, AND HYPERTENSION SYNDROME (HCC): ICD-10-CM

## 2024-05-22 DIAGNOSIS — I15.2 OBESITY, DIABETES, AND HYPERTENSION SYNDROME (HCC): ICD-10-CM

## 2024-05-22 NOTE — TELEPHONE ENCOUNTER
Medication:   Requested Prescriptions     Pending Prescriptions Disp Refills    valsartan-hydroCHLOROthiazide (DIOVAN-HCT) 320-25 MG per tablet [Pharmacy Med Name: VALSARTAN-HCTZ 320-25 MG TAB] 90 tablet 1     Sig: TAKE 1 TABLET BY MOUTH EVERY DAY        Last Filled:      Patient Phone Number: [phone] (home) [phone] (work)    Last appt: 9/21/2023   Next appt: Visit date not found    Last OARRS:       7/28/2015     8:23 AM   RX Monitoring   Periodic Controlled Substance Monitoring No signs of potential drug abuse or diversion identified.

## 2024-05-23 RX ORDER — VALSARTAN AND HYDROCHLOROTHIAZIDE 320; 25 MG/1; MG/1
1 TABLET, FILM COATED ORAL DAILY
Qty: 90 TABLET | Refills: 1 | Status: SHIPPED | OUTPATIENT
Start: 2024-05-23

## 2024-07-09 ENCOUNTER — TELEMEDICINE (OUTPATIENT)
Dept: PRIMARY CARE CLINIC | Age: 69
End: 2024-07-09

## 2024-07-09 DIAGNOSIS — E11.65 TYPE 2 DIABETES MELLITUS WITH HYPERGLYCEMIA, WITHOUT LONG-TERM CURRENT USE OF INSULIN (HCC): ICD-10-CM

## 2024-07-09 DIAGNOSIS — Z00.00 MEDICARE ANNUAL WELLNESS VISIT, SUBSEQUENT: Primary | ICD-10-CM

## 2024-07-09 DIAGNOSIS — I44.2 COMPLETE HEART BLOCK (HCC): ICD-10-CM

## 2024-07-09 DIAGNOSIS — M05.9 RHEUMATOID ARTHRITIS WITH RHEUMATOID FACTOR, UNSPECIFIED (HCC): ICD-10-CM

## 2024-07-09 DIAGNOSIS — E66.01 OBESITY, CLASS III, BMI 40-49.9 (MORBID OBESITY) (HCC): ICD-10-CM

## 2024-07-09 SDOH — ECONOMIC STABILITY: FOOD INSECURITY: WITHIN THE PAST 12 MONTHS, THE FOOD YOU BOUGHT JUST DIDN'T LAST AND YOU DIDN'T HAVE MONEY TO GET MORE.: NEVER TRUE

## 2024-07-09 SDOH — ECONOMIC STABILITY: FOOD INSECURITY: WITHIN THE PAST 12 MONTHS, YOU WORRIED THAT YOUR FOOD WOULD RUN OUT BEFORE YOU GOT MONEY TO BUY MORE.: NEVER TRUE

## 2024-07-09 SDOH — ECONOMIC STABILITY: INCOME INSECURITY: HOW HARD IS IT FOR YOU TO PAY FOR THE VERY BASICS LIKE FOOD, HOUSING, MEDICAL CARE, AND HEATING?: NOT HARD AT ALL

## 2024-07-09 ASSESSMENT — PATIENT HEALTH QUESTIONNAIRE - PHQ9
1. LITTLE INTEREST OR PLEASURE IN DOING THINGS: NOT AT ALL
SUM OF ALL RESPONSES TO PHQ9 QUESTIONS 1 & 2: 0
SUM OF ALL RESPONSES TO PHQ QUESTIONS 1-9: 0
SUM OF ALL RESPONSES TO PHQ QUESTIONS 1-9: 0
2. FEELING DOWN, DEPRESSED OR HOPELESS: NOT AT ALL
SUM OF ALL RESPONSES TO PHQ QUESTIONS 1-9: 0
SUM OF ALL RESPONSES TO PHQ QUESTIONS 1-9: 0

## 2024-07-12 NOTE — PATIENT INSTRUCTIONS
Recommendations:    A preventive eye exam performed by an eye specialist is recommended every 1-2 years to screen for glaucoma; cataracts, macular degeneration, and other eye disorders.  A preventive dental visit is recommended every 6 months.  Try to get at least 150 minutes of exercise per week or 10,000 steps per day on a pedometer .  Order or download the FREE \"Exercise & Physical Activity: Your Everyday Guide\" from [employer]. Call 1-985.348.1740 or search [employer] online.  You need 7600-6322 mg of calcium and 6633-5488 IU of vitamin D per day. It is possible to meet your calcium requirement with diet alone, but a vitamin D supplement is usually necessary to meet this goal.  When exposed to the sun, use a sunscreen that protects against both UVA and UVB radiation with an SPF of 30 or greater. Reapply every 2 to 3 hours or after sweating, drying off with a towel, or swimming.  Always wear a seat belt when traveling in a car. Always wear a helmet when riding a bicycle or motorcycle.

## 2024-07-12 NOTE — PROGRESS NOTES
Patient is would like discuss getting on Trulicity or other insulin options and would like to know when to follow up   
MG per tablet TAKE 1 TABLET BY MOUTH EVERY DAY Yes Sara Rosario DO   minocycline (MINOCIN;DYNACIN) 50 MG capsule Take 1 capsule by mouth 2 times daily Yes Jose Alberto Knight MD   glimepiride (AMARYL) 4 MG tablet TAKE 1 TABLET EVERY MORNING Yes Rajesh Shaw MD   atorvastatin (LIPITOR) 20 MG tablet TAKE 1 TABLET BY MOUTH EVERYDAY AT BEDTIME Yes Sara Rosario DO   oxaprozin (DAYPRO) 600 MG tablet TAKE 1 TABLET BY MOUTH EVERY DAY Yes Sara Rosario DO   metFORMIN (GLUCOPHAGE-XR) 500 MG extended release tablet TAKE 2 TABLETS BY MOUTH TWICE DAILY Yes Sara Rosario DO   cloNIDine (CATAPRES) 0.2 MG tablet Take 1 tablet by mouth 2 times daily Yes Sara Rosario DO   amLODIPine (NORVASC) 5 MG tablet TAKE 1 TABLET EVERY DAY Yes Sara Rosario DO   blood glucose test strips (TRUE METRIX BLOOD GLUCOSE TEST) strip Test glucose  ONCE a day Yes Sara Rosario DO   pantoprazole (PROTONIX) 40 MG tablet Take 1 tablet by mouth daily Yes Sara Rosario DO   albuterol sulfate HFA (PROVENTIL HFA) 108 (90 Base) MCG/ACT inhaler Inhale 2 puffs into the lungs every 6 hours as needed for Wheezing Yes Johnson Pretty MD       [facility] (Including outside providers/suppliers regularly involved in providing care):   Patient Care Team:  Sara Rosario DO as PCP - General (Family Medicine)  Sara Rosario DO as PCP - Empaneled Provider  Jose Alberto Knight MD as Consulting Physician (Dermatology)  Magnus Rutherford III, MD as Consulting Physician (Ophthalmology)      Reviewed and updated this visit:  Tobacco  Allergies  Meds  Problems  Med Hx  Surg Hx  Soc Hx  Fam Hx           Trino Weller, was evaluated through a synchronous (real-time) audio-video encounter. The patient (or guardian if applicable) is aware that this is a billable service, which includes applicable co-pays. This Virtual Visit was conducted with patient's (and/or legal guardian's) consent. Patient identification was verified, and

## 2024-08-01 DIAGNOSIS — E11.69 TYPE 2 DIABETES MELLITUS WITH HYPERLIPIDEMIA (HCC): ICD-10-CM

## 2024-08-01 DIAGNOSIS — E78.5 TYPE 2 DIABETES MELLITUS WITH HYPERLIPIDEMIA (HCC): ICD-10-CM

## 2024-08-01 RX ORDER — ATORVASTATIN CALCIUM 20 MG/1
20 TABLET, FILM COATED ORAL NIGHTLY
Qty: 90 TABLET | Refills: 2 | Status: SHIPPED | OUTPATIENT
Start: 2024-08-01

## 2024-08-01 NOTE — TELEPHONE ENCOUNTER
Medication:   Requested Prescriptions     Pending Prescriptions Disp Refills    atorvastatin (LIPITOR) 20 MG tablet [Pharmacy Med Name: ATORVASTATIN 20MG TABLETS] 90 tablet 2     Sig: TAKE 1 TABLET BY MOUTH EVERY NIGHT        Last Filled:  [unfilled]    Patient Phone Number: [phone] (home) [phone] (work)    Last appt: 7/9/2024   Next appt: Visit date not found    Last OARRS:       7/28/2015     8:23 AM   RX Monitoring   Periodic Controlled Substance Monitoring No signs of potential drug abuse or diversion identified.

## 2024-08-09 DIAGNOSIS — E11.65 TYPE 2 DIABETES MELLITUS WITH HYPERGLYCEMIA, WITHOUT LONG-TERM CURRENT USE OF INSULIN (HCC): Chronic | ICD-10-CM

## 2024-08-09 RX ORDER — METFORMIN HYDROCHLORIDE 500 MG/1
TABLET, EXTENDED RELEASE ORAL
Qty: 360 TABLET | Refills: 2 | Status: SHIPPED | OUTPATIENT
Start: 2024-08-09

## 2024-08-09 NOTE — TELEPHONE ENCOUNTER
Medication:   Requested Prescriptions     Pending Prescriptions Disp Refills    metFORMIN (GLUCOPHAGE-XR) 500 MG extended release tablet [Pharmacy Med Name: METFORMIN ER 500MG 24HR TABS] 360 tablet 2     Sig: TAKE 2 TABLETS BY MOUTH TWICE DAILY        Last Filled:      Patient Phone Number: [phone] (home) [phone] (work)    Last appt: 7/9/2024   Next appt: Visit date not found    Last OARRS:       7/28/2015     8:23 AM   RX Monitoring   Periodic Controlled Substance Monitoring No signs of potential drug abuse or diversion identified.

## 2024-08-17 DIAGNOSIS — K21.9 GASTROESOPHAGEAL REFLUX DISEASE WITHOUT ESOPHAGITIS: ICD-10-CM

## 2024-08-19 ENCOUNTER — OFFICE VISIT (OUTPATIENT)
Dept: PRIMARY CARE CLINIC | Age: 69
End: 2024-08-19

## 2024-08-19 VITALS
OXYGEN SATURATION: 98 % | RESPIRATION RATE: 16 BRPM | WEIGHT: 228.8 LBS | DIASTOLIC BLOOD PRESSURE: 80 MMHG | HEART RATE: 98 BPM | SYSTOLIC BLOOD PRESSURE: 128 MMHG | HEIGHT: 62 IN | BODY MASS INDEX: 42.1 KG/M2 | TEMPERATURE: 97.5 F

## 2024-08-19 DIAGNOSIS — E66.01 OBESITY, CLASS III, BMI 40-49.9 (MORBID OBESITY) (HCC): ICD-10-CM

## 2024-08-19 DIAGNOSIS — E11.65 TYPE 2 DIABETES MELLITUS WITH HYPERGLYCEMIA, WITHOUT LONG-TERM CURRENT USE OF INSULIN (HCC): Primary | ICD-10-CM

## 2024-08-19 LAB — HBA1C MFR BLD: 8 %

## 2024-08-19 RX ORDER — PANTOPRAZOLE SODIUM 40 MG/1
40 TABLET, DELAYED RELEASE ORAL DAILY
Qty: 90 TABLET | Refills: 2 | Status: SHIPPED | OUTPATIENT
Start: 2024-08-19

## 2024-08-19 SDOH — ECONOMIC STABILITY: FOOD INSECURITY: WITHIN THE PAST 12 MONTHS, YOU WORRIED THAT YOUR FOOD WOULD RUN OUT BEFORE YOU GOT MONEY TO BUY MORE.: NEVER TRUE

## 2024-08-19 SDOH — ECONOMIC STABILITY: INCOME INSECURITY: HOW HARD IS IT FOR YOU TO PAY FOR THE VERY BASICS LIKE FOOD, HOUSING, MEDICAL CARE, AND HEATING?: NOT HARD AT ALL

## 2024-08-19 SDOH — ECONOMIC STABILITY: FOOD INSECURITY: WITHIN THE PAST 12 MONTHS, THE FOOD YOU BOUGHT JUST DIDN'T LAST AND YOU DIDN'T HAVE MONEY TO GET MORE.: NEVER TRUE

## 2024-08-19 ASSESSMENT — ENCOUNTER SYMPTOMS
COUGH: 0
VOMITING: 0
NAUSEA: 0
DIARRHEA: 0
CONSTIPATION: 0
SHORTNESS OF BREATH: 0

## 2024-08-19 ASSESSMENT — PATIENT HEALTH QUESTIONNAIRE - PHQ9
2. FEELING DOWN, DEPRESSED OR HOPELESS: NOT AT ALL
SUM OF ALL RESPONSES TO PHQ QUESTIONS 1-9: 0
SUM OF ALL RESPONSES TO PHQ QUESTIONS 1-9: 0
1. LITTLE INTEREST OR PLEASURE IN DOING THINGS: NOT AT ALL
SUM OF ALL RESPONSES TO PHQ9 QUESTIONS 1 & 2: 0
SUM OF ALL RESPONSES TO PHQ QUESTIONS 1-9: 0
SUM OF ALL RESPONSES TO PHQ QUESTIONS 1-9: 0

## 2024-08-19 NOTE — TELEPHONE ENCOUNTER
Medication:   Requested Prescriptions     Pending Prescriptions Disp Refills    pantoprazole (PROTONIX) 40 MG tablet [Pharmacy Med Name: PANTOPRAZOLE 40MG TABLETS] 90 tablet 2     Sig: TAKE 1 TABLET BY MOUTH DAILY        Last Filled:      Patient Phone Number: [phone] (home) [phone] (work)    Last appt: 7/9/2024   Next appt: 8/19/2024    Last OARRS:       7/28/2015     8:23 AM   RX Monitoring   Periodic Controlled Substance Monitoring No signs of potential drug abuse or diversion identified.

## 2024-08-19 NOTE — PROGRESS NOTES
Genitourinary:  Negative for frequency.   Skin:  Negative for rash.       Vitals:    08/19/24 1326   BP: 128/80   Pulse: 98   Resp: 16   Temp: 97.5 °F (36.4 °C)   TempSrc: Tympanic   SpO2: 98%   Weight: 103.8 kg (228 lb 12.8 oz)   Height: 1.575 m (5' 2\")        Body mass index is 41.85 kg/m².    Physical Exam  Constitutional:       Appearance: Normal appearance. She is obese.   Cardiovascular:      Rate and Rhythm: Normal rate and regular rhythm.      Pulses: Normal pulses.      Heart sounds: Normal heart sounds.   Pulmonary:      Effort: Pulmonary effort is normal.      Breath sounds: Normal breath sounds.   Musculoskeletal:      Right lower leg: No edema.      Left lower leg: No edema.   Neurological:      General: No focal deficit present.      Mental Status: She is alert. Mental status is at baseline.           Lab Results   Component Value Date    LABA1C 8.0 08/19/2024     Lab Results   Component Value Date    WBC 7.5 09/25/2022    HGB 11.6 (L) 09/25/2022    HCT 35.1 (L) 09/25/2022    MCV 85.7 09/25/2022     09/25/2022      Lab Results   Component Value Date/Time     09/25/2022 03:19 PM    K 4.3 09/25/2022 03:19 PM     09/25/2022 03:19 PM    CO2 28 09/25/2022 03:19 PM    BUN 22 09/25/2022 03:19 PM    CREATININE 0.9 09/25/2022 03:19 PM    GLUCOSE 168 09/25/2022 03:19 PM    CALCIUM 9.6 09/25/2022 03:19 PM       Lab Results   Component Value Date    CHOL 158 05/27/2016    CHOL 162 06/03/2015    CHOL 103 12/28/2012     Lab Results   Component Value Date    TRIG 88 05/27/2016    TRIG 98 06/03/2015    TRIG 109 08/15/2012     Lab Results   Component Value Date    HDL 43 05/27/2016    HDL 44 06/03/2015    HDL 42 08/15/2012     No components found for: \"LDLCHOLESTEROL\", \"LDLCALC\"    Lab Results   Component Value Date    VLDL 18 05/27/2016    VLDL 20 06/03/2015    VLDL 21 01/30/2012     Lab Results   Component Value Date    CHOLHDLRATIO 4.2 08/15/2012        The ASCVD Risk score (Yi HILLIARD, et al.,

## 2024-09-09 DIAGNOSIS — E11.69 OBESITY, DIABETES, AND HYPERTENSION SYNDROME (HCC): ICD-10-CM

## 2024-09-09 DIAGNOSIS — I15.2 OBESITY, DIABETES, AND HYPERTENSION SYNDROME (HCC): ICD-10-CM

## 2024-09-09 DIAGNOSIS — E66.9 OBESITY, DIABETES, AND HYPERTENSION SYNDROME (HCC): ICD-10-CM

## 2024-09-09 DIAGNOSIS — E11.59 OBESITY, DIABETES, AND HYPERTENSION SYNDROME (HCC): ICD-10-CM

## 2024-09-11 RX ORDER — AMLODIPINE BESYLATE 5 MG/1
TABLET ORAL
Qty: 90 TABLET | Refills: 2 | Status: SHIPPED | OUTPATIENT
Start: 2024-09-11

## 2024-09-13 ENCOUNTER — TELEPHONE (OUTPATIENT)
Dept: CARDIOLOGY CLINIC | Age: 69
End: 2024-09-13

## 2024-10-01 DIAGNOSIS — E66.9 OBESITY, DIABETES, AND HYPERTENSION SYNDROME (HCC): ICD-10-CM

## 2024-10-01 DIAGNOSIS — E11.59 OBESITY, DIABETES, AND HYPERTENSION SYNDROME (HCC): ICD-10-CM

## 2024-10-01 DIAGNOSIS — E11.69 OBESITY, DIABETES, AND HYPERTENSION SYNDROME (HCC): ICD-10-CM

## 2024-10-01 DIAGNOSIS — I15.2 OBESITY, DIABETES, AND HYPERTENSION SYNDROME (HCC): ICD-10-CM

## 2024-10-01 NOTE — TELEPHONE ENCOUNTER
Medication:   Requested Prescriptions     Pending Prescriptions Disp Refills    cloNIDine (CATAPRES) 0.2 MG tablet [Pharmacy Med Name: CLONIDINE 0.2MG TABLETS] 180 tablet 2     Sig: TAKE 1 TABLET BY MOUTH TWICE DAILY        Last Filled:  [unfilled]    Patient Phone Number: [phone] (home) [phone] (work)    Last appt: 8/19/2024   Next appt: Visit date not found    Last OARRS:       7/28/2015     8:23 AM   RX Monitoring   Periodic Controlled Substance Monitoring No signs of potential drug abuse or diversion identified.

## 2024-10-03 RX ORDER — CLONIDINE HYDROCHLORIDE 0.2 MG/1
0.2 TABLET ORAL 2 TIMES DAILY
Qty: 180 TABLET | Refills: 2 | Status: SHIPPED | OUTPATIENT
Start: 2024-10-03

## 2024-11-05 ENCOUNTER — OFFICE VISIT (OUTPATIENT)
Dept: CARDIOLOGY CLINIC | Age: 69
End: 2024-11-05

## 2024-11-05 ENCOUNTER — NURSE ONLY (OUTPATIENT)
Dept: CARDIOLOGY CLINIC | Age: 69
End: 2024-11-05

## 2024-11-05 VITALS
DIASTOLIC BLOOD PRESSURE: 82 MMHG | WEIGHT: 228.6 LBS | BODY MASS INDEX: 41.81 KG/M2 | SYSTOLIC BLOOD PRESSURE: 134 MMHG | OXYGEN SATURATION: 93 % | HEART RATE: 83 BPM

## 2024-11-05 DIAGNOSIS — I44.2 CHB (COMPLETE HEART BLOCK) (HCC): ICD-10-CM

## 2024-11-05 DIAGNOSIS — Z95.0 PACEMAKER: Primary | ICD-10-CM

## 2024-11-05 DIAGNOSIS — I44.2 COMPLETE HEART BLOCK (HCC): Primary | ICD-10-CM

## 2024-11-05 DIAGNOSIS — I47.19 PAT (PAROXYSMAL ATRIAL TACHYCARDIA) (HCC): ICD-10-CM

## 2024-11-05 DIAGNOSIS — I44.2 COMPLETE HEART BLOCK (HCC): ICD-10-CM

## 2024-11-05 DIAGNOSIS — Z95.0 PRESENCE OF CARDIAC PACEMAKER: ICD-10-CM

## 2024-11-05 NOTE — PROGRESS NOTES
[facility]   Electrophysiology follow up     Date: 11/5/2024  Reason for Consultation: Heart block   Consult Requesting Physician: No att. providers found     CC: Pacemaker follow up     HPI:   Trino Weller is a 68 y.o. female history of hypertension, diabetes, complete heart status post dual chamber pacemaker 4/27/2021 ([employer]).    Patient presents today as follow up for management of her device. Denies shortness of breath, chest pain, palpitations or racing heart. Lower extremity swelling that is unchanged.     Review of System:  Complete 10 point ROS performed and negative unless noted in above HPI or below    Prior to Admission medications    Medication Sig Start Date End Date Taking? Authorizing Provider   cloNIDine (CATAPRES) 0.2 MG tablet TAKE 1 TABLET BY MOUTH TWICE DAILY 10/3/24  Yes Sara Rosario DO   amLODIPine (NORVASC) 5 MG tablet TAKE 1 TABLET BY MOUTH EVERY DAY 9/11/24  Yes Sara Rosario DO   pantoprazole (PROTONIX) 40 MG tablet TAKE 1 TABLET BY MOUTH DAILY 8/19/24  Yes Sara Rosario DO   metFORMIN (GLUCOPHAGE-XR) 500 MG extended release tablet TAKE 2 TABLETS BY MOUTH TWICE DAILY 8/9/24  Yes Sara Rosario DO   atorvastatin (LIPITOR) 20 MG tablet TAKE 1 TABLET BY MOUTH EVERY NIGHT 8/1/24  Yes Sara Rosario DO   valsartan-hydroCHLOROthiazide (DIOVAN-HCT) 320-25 MG per tablet TAKE 1 TABLET BY MOUTH EVERY DAY 5/23/24  Yes Sara Rosario DO   minocycline (MINOCIN;DYNACIN) 50 MG capsule Take 1 capsule by mouth 2 times daily  Patient taking differently: Take 1 capsule by mouth as needed 2/29/24  Yes Jose Alberto Knight MD   glimepiride (AMARYL) 4 MG tablet TAKE 1 TABLET EVERY MORNING 2/23/24  Yes Rajesh Shaw MD   oxaprozin (DAYPRO) 600 MG tablet TAKE 1 TABLET BY MOUTH EVERY DAY 9/21/23  Yes Sara Rosario DO   blood glucose test strips (TRUE METRIX BLOOD GLUCOSE TEST) strip Test glucose  ONCE a day 9/21/23  Yes Sara Rosario DO   albuterol sulfate HFA

## 2024-11-05 NOTE — PROGRESS NOTES
[facility]   Electrophysiology follow up     Date: 11/5/2024  Reason for Consultation: CHB s/p PPM   Consult Requesting Physician: No att. providers found     CC: ***     HPI:   Trino Weller is a 68 y.o. female with a past medical history significant for diabetes, hypertension, and CHB s/p [employer] dual chamber pacemaker 4/27/21 with Dr. Sellers. She was last seen in office 8/29/23 and presents today for a follow up for device management.                   Review of System:  Complete 10 point ROS performed and negative unless noted in above HPI or below    Prior to Admission medications    Medication Sig Start Date End Date Taking? Authorizing Provider   cloNIDine (CATAPRES) 0.2 MG tablet TAKE 1 TABLET BY MOUTH TWICE DAILY 10/3/24   Sara Rosario,    amLODIPine (NORVASC) 5 MG tablet TAKE 1 TABLET BY MOUTH EVERY DAY 9/11/24   Sara Rosario DO   pantoprazole (PROTONIX) 40 MG tablet TAKE 1 TABLET BY MOUTH DAILY 8/19/24   Sara Rosario DO   metFORMIN (GLUCOPHAGE-XR) 500 MG extended release tablet TAKE 2 TABLETS BY MOUTH TWICE DAILY 8/9/24   Sara Rosario DO   atorvastatin (LIPITOR) 20 MG tablet TAKE 1 TABLET BY MOUTH EVERY NIGHT 8/1/24   Sara Rosario DO   valsartan-hydroCHLOROthiazide (DIOVAN-HCT) 320-25 MG per tablet TAKE 1 TABLET BY MOUTH EVERY DAY 5/23/24   Sara Rosario DO   minocycline (MINOCIN;DYNACIN) 50 MG capsule Take 1 capsule by mouth 2 times daily 2/29/24   Jose Alberto Knight MD   glimepiride (AMARYL) 4 MG tablet TAKE 1 TABLET EVERY MORNING 2/23/24   Rajesh Shaw MD   oxaprozin (DAYPRO) 600 MG tablet TAKE 1 TABLET BY MOUTH EVERY DAY 9/21/23   Sara Rosario DO   blood glucose test strips (TRUE METRIX BLOOD GLUCOSE TEST) strip Test glucose  ONCE a day 9/21/23   Sara Rosario DO   albuterol sulfate HFA (PROVENTIL HFA) 108 (90 Base) MCG/ACT inhaler Inhale 2 puffs into the lungs every 6 hours as needed for Wheezing 4/19/21   Johnson Pretty MD       Past

## 2024-11-05 NOTE — PATIENT INSTRUCTIONS
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

## 2024-11-15 DIAGNOSIS — I15.2 OBESITY, DIABETES, AND HYPERTENSION SYNDROME (HCC): ICD-10-CM

## 2024-11-15 DIAGNOSIS — E66.9 OBESITY, DIABETES, AND HYPERTENSION SYNDROME (HCC): ICD-10-CM

## 2024-11-15 DIAGNOSIS — E11.69 OBESITY, DIABETES, AND HYPERTENSION SYNDROME (HCC): ICD-10-CM

## 2024-11-15 DIAGNOSIS — E11.59 OBESITY, DIABETES, AND HYPERTENSION SYNDROME (HCC): ICD-10-CM

## 2024-11-15 NOTE — TELEPHONE ENCOUNTER
Medication:   Requested Prescriptions     Pending Prescriptions Disp Refills    valsartan-hydroCHLOROthiazide (DIOVAN-HCT) 320-25 MG per tablet [Pharmacy Med Name: VALSARTAN-HCTZ 320-25 MG TAB] 90 tablet 1     Sig: TAKE 1 TABLET BY MOUTH EVERY DAY        Last Filled:      Patient Phone Number: [phone] (home) [phone] (work)    Last appt: 8/19/2024   Next appt: Visit date not found    Last OARRS:       7/28/2015     8:23 AM   RX Monitoring   Periodic Controlled Substance Monitoring No signs of potential drug abuse or diversion identified.

## 2024-11-18 RX ORDER — VALSARTAN AND HYDROCHLOROTHIAZIDE 320; 25 MG/1; MG/1
1 TABLET, FILM COATED ORAL DAILY
Qty: 90 TABLET | Refills: 1 | Status: SHIPPED | OUTPATIENT
Start: 2024-11-18

## 2024-11-22 DIAGNOSIS — E11.65 TYPE 2 DIABETES MELLITUS WITH HYPERGLYCEMIA, WITHOUT LONG-TERM CURRENT USE OF INSULIN (HCC): Chronic | ICD-10-CM

## 2024-11-22 NOTE — TELEPHONE ENCOUNTER
Medication:   Requested Prescriptions     Pending Prescriptions Disp Refills    glimepiride (AMARYL) 4 MG tablet [Pharmacy Med Name: GLIMEPIRIDE 4MG TABLETS] 90 tablet 2     Sig: TAKE 1 TABLET BY MOUTH EVERY MORNING        Last Filled:      Patient Phone Number: [phone] (home) [phone] (work)    Last appt: 8/19/2024   Next appt: Visit date not found    Last OARRS:       7/28/2015     8:23 AM   RX Monitoring   Periodic Controlled Substance Monitoring No signs of potential drug abuse or diversion identified.

## 2024-11-25 RX ORDER — GLIMEPIRIDE 4 MG/1
TABLET ORAL
Qty: 90 TABLET | Refills: 2 | Status: SHIPPED | OUTPATIENT
Start: 2024-11-25

## 2024-12-15 DIAGNOSIS — M05.9 RHEUMATOID ARTHRITIS WITH RHEUMATOID FACTOR, UNSPECIFIED (HCC): ICD-10-CM

## 2024-12-16 NOTE — TELEPHONE ENCOUNTER
Medication:   Requested Prescriptions     Pending Prescriptions Disp Refills    oxaprozin (DAYPRO) 600 MG tablet [Pharmacy Med Name: OXAPROZIN 600 MG TABLET] 30 tablet 17     Sig: TAKE 1 TABLET BY MOUTH EVERY DAY        Last Filled:      Patient Phone Number: [phone] (home) [phone] (work)    Last appt: 8/19/2024   Next appt: Visit date not found    Last OARRS:       7/28/2015     8:23 AM   RX Monitoring   Periodic Controlled Substance Monitoring No signs of potential drug abuse or diversion identified.

## 2024-12-27 ENCOUNTER — TELEPHONE (OUTPATIENT)
Age: 69
End: 2024-12-27

## 2024-12-27 NOTE — TELEPHONE ENCOUNTER
Patient has upcoming appointment on 3/5/2025.  Left message advising patient that I will put her on the cancellation list and call if something sooner becomes available.

## 2024-12-27 NOTE — TELEPHONE ENCOUNTER
Patient has a mole on her back with irregular edges that she wants checked.  How soon could she come in?  [phone]

## 2025-01-03 ASSESSMENT — PATIENT HEALTH QUESTIONNAIRE - PHQ9
1. LITTLE INTEREST OR PLEASURE IN DOING THINGS: NOT AT ALL
2. FEELING DOWN, DEPRESSED OR HOPELESS: NOT AT ALL
SUM OF ALL RESPONSES TO PHQ QUESTIONS 1-9: 0
SUM OF ALL RESPONSES TO PHQ QUESTIONS 1-9: 0
SUM OF ALL RESPONSES TO PHQ9 QUESTIONS 1 & 2: 0
SUM OF ALL RESPONSES TO PHQ QUESTIONS 1-9: 0
SUM OF ALL RESPONSES TO PHQ QUESTIONS 1-9: 0
1. LITTLE INTEREST OR PLEASURE IN DOING THINGS: NOT AT ALL
2. FEELING DOWN, DEPRESSED OR HOPELESS: NOT AT ALL
SUM OF ALL RESPONSES TO PHQ9 QUESTIONS 1 & 2: 0

## 2025-01-09 ENCOUNTER — OFFICE VISIT (OUTPATIENT)
Dept: PRIMARY CARE CLINIC | Age: 70
End: 2025-01-09
Payer: MEDICARE

## 2025-01-09 DIAGNOSIS — M05.9 RHEUMATOID ARTHRITIS WITH RHEUMATOID FACTOR, UNSPECIFIED (HCC): ICD-10-CM

## 2025-01-09 DIAGNOSIS — E11.9 DIABETES MELLITUS WITH COINCIDENT HYPERTENSION (HCC): ICD-10-CM

## 2025-01-09 DIAGNOSIS — E11.65 TYPE 2 DIABETES MELLITUS WITH HYPERGLYCEMIA, WITHOUT LONG-TERM CURRENT USE OF INSULIN (HCC): Primary | ICD-10-CM

## 2025-01-09 DIAGNOSIS — I44.2 COMPLETE HEART BLOCK (HCC): ICD-10-CM

## 2025-01-09 DIAGNOSIS — E78.5 TYPE 2 DIABETES MELLITUS WITH HYPERLIPIDEMIA (HCC): ICD-10-CM

## 2025-01-09 DIAGNOSIS — E11.69 TYPE 2 DIABETES MELLITUS WITH HYPERLIPIDEMIA (HCC): ICD-10-CM

## 2025-01-09 DIAGNOSIS — I10 DIABETES MELLITUS WITH COINCIDENT HYPERTENSION (HCC): ICD-10-CM

## 2025-01-09 LAB — HBA1C MFR BLD: 6.9 %

## 2025-01-09 PROCEDURE — 3075F SYST BP GE 130 - 139MM HG: CPT | Performed by: FAMILY MEDICINE

## 2025-01-09 PROCEDURE — 1123F ACP DISCUSS/DSCN MKR DOCD: CPT | Performed by: FAMILY MEDICINE

## 2025-01-09 PROCEDURE — G2211 COMPLEX E/M VISIT ADD ON: HCPCS | Performed by: FAMILY MEDICINE

## 2025-01-09 PROCEDURE — 83036 HEMOGLOBIN GLYCOSYLATED A1C: CPT | Performed by: FAMILY MEDICINE

## 2025-01-09 PROCEDURE — 2022F DILAT RTA XM EVC RTNOPTHY: CPT | Performed by: FAMILY MEDICINE

## 2025-01-09 PROCEDURE — 1160F RVW MEDS BY RX/DR IN RCRD: CPT | Performed by: FAMILY MEDICINE

## 2025-01-09 PROCEDURE — 1090F PRES/ABSN URINE INCON ASSESS: CPT | Performed by: FAMILY MEDICINE

## 2025-01-09 PROCEDURE — 1159F MED LIST DOCD IN RCRD: CPT | Performed by: FAMILY MEDICINE

## 2025-01-09 PROCEDURE — 99214 OFFICE O/P EST MOD 30 MIN: CPT | Performed by: FAMILY MEDICINE

## 2025-01-09 PROCEDURE — 3079F DIAST BP 80-89 MM HG: CPT | Performed by: FAMILY MEDICINE

## 2025-01-09 PROCEDURE — 3017F COLORECTAL CA SCREEN DOC REV: CPT | Performed by: FAMILY MEDICINE

## 2025-01-09 PROCEDURE — G8427 DOCREV CUR MEDS BY ELIG CLIN: HCPCS | Performed by: FAMILY MEDICINE

## 2025-01-09 PROCEDURE — 3044F HG A1C LEVEL LT 7.0%: CPT | Performed by: FAMILY MEDICINE

## 2025-01-09 PROCEDURE — G8417 CALC BMI ABV UP PARAM F/U: HCPCS | Performed by: FAMILY MEDICINE

## 2025-01-09 PROCEDURE — G8399 PT W/DXA RESULTS DOCUMENT: HCPCS | Performed by: FAMILY MEDICINE

## 2025-01-09 PROCEDURE — 1036F TOBACCO NON-USER: CPT | Performed by: FAMILY MEDICINE

## 2025-01-09 SDOH — ECONOMIC STABILITY: FOOD INSECURITY: WITHIN THE PAST 12 MONTHS, YOU WORRIED THAT YOUR FOOD WOULD RUN OUT BEFORE YOU GOT MONEY TO BUY MORE.: NEVER TRUE

## 2025-01-09 SDOH — ECONOMIC STABILITY: FOOD INSECURITY: WITHIN THE PAST 12 MONTHS, THE FOOD YOU BOUGHT JUST DIDN'T LAST AND YOU DIDN'T HAVE MONEY TO GET MORE.: NEVER TRUE

## 2025-01-09 NOTE — PROGRESS NOTES
Chronic, at goal (stable), cont statin         Relevant Medications    atorvastatin (LIPITOR) 20 MG tablet    metFORMIN (GLUCOPHAGE-XR) 500 MG extended release tablet    amLODIPine (NORVASC) 5 MG tablet    cloNIDine (CATAPRES) 0.2 MG tablet    valsartan-hydroCHLOROthiazide (DIOVAN-HCT) 320-25 MG per tablet    glimepiride (AMARYL) 4 MG tablet    Semaglutide,0.25 or 0.5MG/DOS, 2 MG/3ML SOPN    Other Relevant Orders    Hepatic Function Panel    Lipid Panel    Diabetes mellitus with coincident hypertension (HCC) (Chronic)       Chronic, at goal (stable), cont valsartan-hctz, amlodipine, and clonidine         Relevant Medications    metFORMIN (GLUCOPHAGE-XR) 500 MG extended release tablet    glimepiride (AMARYL) 4 MG tablet    Semaglutide,0.25 or 0.5MG/DOS, 2 MG/3ML SOPN    Other Relevant Orders    Comprehensive Metabolic Panel    Rheumatoid arthritis with rheumatoid factor, unspecified     Relevant Medications    oxaprozin (DAYPRO) 600 MG tablet       Orders Placed This Encounter   Procedures    Comprehensive Metabolic Panel     Standing Status:   Future     Standing Expiration Date:   1/9/2026    Hemoglobin A1C     Standing Status:   Future     Standing Expiration Date:   1/9/2026    Hepatic Function Panel     Standing Status:   Future     Standing Expiration Date:   1/9/2026    Lipid Panel     Standing Status:   Future     Standing Expiration Date:   1/9/2026    Albumin/Creatinine Ratio, Urine     Standing Status:   Future     Standing Expiration Date:   1/9/2026    POCT glycosylated hemoglobin (Hb A1C)       No follow-ups on file.        Dr. Sara Rosario D.O.  - Family Medicine and [facility]        Usual doctor's hours are:       Monday 7:00 am to 5:30 pm  Wednesday 7:00 am to 4:30 pm  Thursday 7:00 am to 4:30 pm  Friday 7:00 am to 3:30 pm  Saturdays, Sundays, and after hours: E-Visits are available    We observe most federal holidays and Good Friday.    We ask that you only contact the office

## 2025-01-13 ENCOUNTER — TELEPHONE (OUTPATIENT)
Dept: ADMINISTRATIVE | Age: 70
End: 2025-01-13

## 2025-01-13 VITALS
BODY MASS INDEX: 41.77 KG/M2 | DIASTOLIC BLOOD PRESSURE: 80 MMHG | WEIGHT: 227 LBS | HEART RATE: 96 BPM | HEIGHT: 62 IN | OXYGEN SATURATION: 96 % | SYSTOLIC BLOOD PRESSURE: 138 MMHG | TEMPERATURE: 97.2 F

## 2025-01-13 PROBLEM — E11.69 TYPE 2 DIABETES MELLITUS WITH HYPERLIPIDEMIA (HCC): Chronic | Status: ACTIVE | Noted: 2023-09-21

## 2025-01-13 PROBLEM — I10 DIABETES MELLITUS WITH COINCIDENT HYPERTENSION (HCC): Chronic | Status: ACTIVE | Noted: 2023-09-21

## 2025-01-13 PROBLEM — E11.9 DIABETES MELLITUS WITH COINCIDENT HYPERTENSION (HCC): Chronic | Status: ACTIVE | Noted: 2023-09-21

## 2025-01-13 PROBLEM — E78.5 TYPE 2 DIABETES MELLITUS WITH HYPERLIPIDEMIA (HCC): Chronic | Status: ACTIVE | Noted: 2023-09-21

## 2025-01-13 PROBLEM — I44.2 COMPLETE HEART BLOCK (HCC): Chronic | Status: ACTIVE | Noted: 2021-04-23

## 2025-01-13 NOTE — ASSESSMENT & PLAN NOTE
Chronic, at goal (stable), cont amaryl and metformin.  Start semaglutide 0.25 then 0.5 if tolerating

## 2025-01-13 NOTE — TELEPHONE ENCOUNTER
Submitted PA for Ozempic (0.25 or 0.5 MG/DOSE) 2MG/3ML pen-injectors  Via CMM (Key: [id number]) STATUS: PENDING.    Follow up done daily; if no decision with in three days we will refax.  If another three days goes by with no decision will call the insurance for status.

## 2025-01-13 NOTE — TELEPHONE ENCOUNTER
The medication is APPROVED.    CaseId:[id number];Status:Approved;Review Type:Prior Auth;Coverage Start Date:01/01/2025;Coverage End Date:01/13/2026;. Authorization Expiration Date: January 13, 2026.     If this requires a response please respond to the pool ( P MHCX P[facility] MEDICATION PRE-AUTH).      Thank you please advise patient.

## 2025-02-03 ENCOUNTER — OFFICE VISIT (OUTPATIENT)
Dept: PRIMARY CARE CLINIC | Age: 70
End: 2025-02-03
Payer: MEDICARE

## 2025-02-03 VITALS
BODY MASS INDEX: 38.24 KG/M2 | TEMPERATURE: 97.5 F | WEIGHT: 224 LBS | OXYGEN SATURATION: 97 % | HEIGHT: 64 IN | DIASTOLIC BLOOD PRESSURE: 85 MMHG | HEART RATE: 72 BPM | SYSTOLIC BLOOD PRESSURE: 136 MMHG

## 2025-02-03 DIAGNOSIS — J18.9 PNEUMONIA OF BOTH LOWER LOBES DUE TO INFECTIOUS ORGANISM: Primary | ICD-10-CM

## 2025-02-03 DIAGNOSIS — E66.01 MORBID (SEVERE) OBESITY DUE TO EXCESS CALORIES: ICD-10-CM

## 2025-02-03 PROCEDURE — 3017F COLORECTAL CA SCREEN DOC REV: CPT | Performed by: FAMILY MEDICINE

## 2025-02-03 PROCEDURE — 1159F MED LIST DOCD IN RCRD: CPT | Performed by: FAMILY MEDICINE

## 2025-02-03 PROCEDURE — 1090F PRES/ABSN URINE INCON ASSESS: CPT | Performed by: FAMILY MEDICINE

## 2025-02-03 PROCEDURE — 1036F TOBACCO NON-USER: CPT | Performed by: FAMILY MEDICINE

## 2025-02-03 PROCEDURE — 99214 OFFICE O/P EST MOD 30 MIN: CPT | Performed by: FAMILY MEDICINE

## 2025-02-03 PROCEDURE — G8427 DOCREV CUR MEDS BY ELIG CLIN: HCPCS | Performed by: FAMILY MEDICINE

## 2025-02-03 PROCEDURE — 3079F DIAST BP 80-89 MM HG: CPT | Performed by: FAMILY MEDICINE

## 2025-02-03 PROCEDURE — 1123F ACP DISCUSS/DSCN MKR DOCD: CPT | Performed by: FAMILY MEDICINE

## 2025-02-03 PROCEDURE — G8417 CALC BMI ABV UP PARAM F/U: HCPCS | Performed by: FAMILY MEDICINE

## 2025-02-03 PROCEDURE — G8399 PT W/DXA RESULTS DOCUMENT: HCPCS | Performed by: FAMILY MEDICINE

## 2025-02-03 PROCEDURE — G2211 COMPLEX E/M VISIT ADD ON: HCPCS | Performed by: FAMILY MEDICINE

## 2025-02-03 PROCEDURE — 1160F RVW MEDS BY RX/DR IN RCRD: CPT | Performed by: FAMILY MEDICINE

## 2025-02-03 PROCEDURE — 3075F SYST BP GE 130 - 139MM HG: CPT | Performed by: FAMILY MEDICINE

## 2025-02-03 RX ORDER — GUAIFENESIN 600 MG/1
1200 TABLET, EXTENDED RELEASE ORAL 2 TIMES DAILY
Qty: 40 TABLET | Refills: 0 | Status: SHIPPED | OUTPATIENT
Start: 2025-02-03 | End: 2025-02-13

## 2025-02-03 RX ORDER — DOXYCYCLINE HYCLATE 100 MG
100 TABLET ORAL 2 TIMES DAILY
Qty: 20 TABLET | Refills: 0 | Status: SHIPPED | OUTPATIENT
Start: 2025-02-03 | End: 2025-02-13

## 2025-02-03 RX ORDER — ALBUTEROL SULFATE 90 UG/1
2 INHALANT RESPIRATORY (INHALATION) EVERY 6 HOURS PRN
Qty: 1 EACH | Refills: 5 | Status: SHIPPED | OUTPATIENT
Start: 2025-02-03

## 2025-02-03 RX ORDER — CEFPODOXIME PROXETIL 200 MG/1
200 TABLET, FILM COATED ORAL 2 TIMES DAILY
Qty: 20 TABLET | Refills: 0 | Status: SHIPPED | OUTPATIENT
Start: 2025-02-03 | End: 2025-02-13

## 2025-02-03 NOTE — PROGRESS NOTES
calculate for the following reasons:    Cannot find a previous HDL lab    Cannot find a previous total cholesterol lab     Patient received counseling and, if relevant, printed instructions for all symptoms listed in CC and HPI, as well as for all diagnoses brought onto today's visit note below. Typical counseling includes, but is not limited to, non-pharmacologic measures to manage listed symptoms and conditions; appropriate use, risks and benefits for all prescribed medications; potential interactions between medications both prescribed and OTC; diet; exercise; healthy behaviors; and goalsetting to improve health. Patient or responsible party was involved in shared decision making and had opportunity to have all questions answered.  Except as noted below, all chronic problems have been reviewed and are stable to continue medications or other therapy as previously documented in the patient's chart, with changes per orders or documentation below:    1. Pneumonia of both lower lobes due to infectious organism  -     cefpodoxime (VANTIN) 200 MG tablet; Take 1 tablet by mouth 2 times daily for 10 days, Disp-20 tablet, R-0Normal  -     doxycycline hyclate (VIBRA-TABS) 100 MG tablet; Take 1 tablet by mouth 2 times daily for 10 days, Disp-20 tablet, R-0Normal  -     guaiFENesin (MUCINEX) 600 MG extended release tablet; Take 2 tablets by mouth 2 times daily for 10 days, Disp-40 tablet, R-0Normal  -     albuterol sulfate HFA (PROVENTIL HFA) 108 (90 Base) MCG/ACT inhaler; Inhale 2 puffs into the lungs every 6 hours as needed for Wheezing, Disp-1 each, R-5Normal  2. Morbid (severe) obesity due to excess calories (E66.01)  3. Body mass index [BMI] 40.0-44.9, adult (Z68.41)              Problem List             Diagnosed       Unprioritized    Morbid (severe) obesity due to excess calories (E66.01)     Relevant Medications    metFORMIN (GLUCOPHAGE-XR) 500 MG extended release tablet    glimepiride (AMARYL) 4 MG tablet

## 2025-02-05 ENCOUNTER — TELEPHONE (OUTPATIENT)
Dept: CARDIOLOGY CLINIC | Age: 70
End: 2025-02-05

## 2025-02-05 NOTE — TELEPHONE ENCOUNTER
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    Please assist.    Trino's callback: [phone]

## 2025-02-05 NOTE — TELEPHONE ENCOUNTER
Dr. Bui,    Patient is requesting clearance letter to drive DOT.    Previous letter provided by David Sellers MD     Trino Weller has a cardiac history of paroxysmal atrial tachycardia, complete heart block with implantation of dual chamber pacemaker on 04/27/2021.  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       Ok to send letter stating above.  Patient was seen by you in office on 11/5/24

## 2025-02-05 NOTE — TELEPHONE ENCOUNTER
Called pt to ask if there is a form with requirements that we need to fill out or if she just needs a letter that states OK to drive. Pt states she just needs a letter and will .

## 2025-02-06 NOTE — TELEPHONE ENCOUNTER
I called patient to offer open appt for today.  She is recovering from pneumonia.  She cannot come in today.

## 2025-02-07 NOTE — TELEPHONE ENCOUNTER
Trino called to f/u.    Relayed that Dr. Bui is in procedures all day, per SULAIMAN Dove.    Trino shared that she needs the later signed and ready for pickup on Monday, 2/10 at the latest.

## 2025-02-20 DIAGNOSIS — E11.69 TYPE 2 DIABETES MELLITUS WITH HYPERLIPIDEMIA (HCC): ICD-10-CM

## 2025-02-20 DIAGNOSIS — E78.5 TYPE 2 DIABETES MELLITUS WITH HYPERLIPIDEMIA (HCC): ICD-10-CM

## 2025-02-20 RX ORDER — ATORVASTATIN CALCIUM 20 MG/1
20 TABLET, FILM COATED ORAL NIGHTLY
Qty: 90 TABLET | Refills: 2 | Status: SHIPPED | OUTPATIENT
Start: 2025-02-20

## 2025-02-20 NOTE — TELEPHONE ENCOUNTER
Medication:   Requested Prescriptions     Pending Prescriptions Disp Refills    atorvastatin (LIPITOR) 20 MG tablet [Pharmacy Med Name: ATORVASTATIN 20MG TABLETS] 90 tablet 2     Sig: TAKE 1 TABLET BY MOUTH EVERY NIGHT        Last Filled:      Patient Phone Number: [phone] (home) [phone] (work)    Last appt: 2/3/2025   Next appt: Visit date not found    Last OARRS:       7/28/2015     8:23 AM   RX Monitoring   Periodic Controlled Substance Monitoring No signs of potential drug abuse or diversion identified.

## 2025-02-24 ENCOUNTER — OFFICE VISIT (OUTPATIENT)
Age: 70
End: 2025-02-24
Payer: MEDICARE

## 2025-02-24 DIAGNOSIS — L71.9 ROSACEA: ICD-10-CM

## 2025-02-24 DIAGNOSIS — L82.1 SK (SEBORRHEIC KERATOSIS): Primary | ICD-10-CM

## 2025-02-24 PROCEDURE — 1159F MED LIST DOCD IN RCRD: CPT | Performed by: DERMATOLOGY

## 2025-02-24 PROCEDURE — 99213 OFFICE O/P EST LOW 20 MIN: CPT | Performed by: DERMATOLOGY

## 2025-02-24 PROCEDURE — 99212 OFFICE O/P EST SF 10 MIN: CPT | Performed by: DERMATOLOGY

## 2025-02-24 PROCEDURE — G8399 PT W/DXA RESULTS DOCUMENT: HCPCS | Performed by: DERMATOLOGY

## 2025-02-24 PROCEDURE — G8417 CALC BMI ABV UP PARAM F/U: HCPCS | Performed by: DERMATOLOGY

## 2025-02-24 PROCEDURE — 1123F ACP DISCUSS/DSCN MKR DOCD: CPT | Performed by: DERMATOLOGY

## 2025-02-24 PROCEDURE — 1036F TOBACCO NON-USER: CPT | Performed by: DERMATOLOGY

## 2025-02-24 PROCEDURE — 1160F RVW MEDS BY RX/DR IN RCRD: CPT | Performed by: DERMATOLOGY

## 2025-02-24 PROCEDURE — G8427 DOCREV CUR MEDS BY ELIG CLIN: HCPCS | Performed by: DERMATOLOGY

## 2025-02-24 PROCEDURE — 1090F PRES/ABSN URINE INCON ASSESS: CPT | Performed by: DERMATOLOGY

## 2025-02-24 PROCEDURE — 3017F COLORECTAL CA SCREEN DOC REV: CPT | Performed by: DERMATOLOGY

## 2025-02-24 RX ORDER — MINOCYCLINE HYDROCHLORIDE 50 MG/1
50 CAPSULE ORAL 2 TIMES DAILY
Qty: 60 CAPSULE | Refills: 1 | Status: SHIPPED | OUTPATIENT
Start: 2025-02-24

## 2025-02-24 NOTE — PROGRESS NOTES
[facility]  Jose Alberto Knight MD  [phone]      Trino Weller  1955    69 y.o. female     Date of Visit: 2/24/2025    Chief Complaint: skin lesion    History of Present Illness:    1.  She reports skin lesion of concern on the right lower back that was noted during a massage.    2.  She also has a history of chronic acne rosacea-reports good control with use of metronidazole 0.75% cream.  She has rarely had to take minocycline 50 mg twice daily for 2 or 4 weeks for major flares.     Has a pacemaker.      Review of Systems:  Gen: Feels well, good sense of health.    Past Medical History, Family History, Surgical History, Medications and Allergies reviewed.    Past Medical History:   Diagnosis Date    Complete heart block (HCC) 4/23/2021    Diabetes mellitus (HCC)     GERD (gastroesophageal reflux disease)     Hyperlipidemia     Hypertension     Obesity     S/P hip replacement     Sleep apnea      Past Surgical History:   Procedure Laterality Date    COLONOSCOPY  9/28/2021    COLONOSCOPY POLYPECTOMY SNARE/COLD BIOPSY performed by Shanelle Byrd MD at [facility] ENDOSCOPY    JOINT REPLACEMENT      TOTAL HIP ARTHROPLASTY Left Oct '13    UPPER GASTROINTESTINAL ENDOSCOPY N/A 9/28/2021    EGD BIOPSY performed by Shanelle Byrd MD at [facility] ENDOSCOPY    UPPER GASTROINTESTINAL ENDOSCOPY N/A 10/13/2021    ESOPHAGOGASTRODUODENOSCOPY RADIOFREQUENCY ABLATION performed by Shanelle Byrd MD at [facility] ENDOSCOPY    UPPER GASTROINTESTINAL ENDOSCOPY N/A 10/13/2021    EGD BIOPSY performed by Shanelle Byrd MD at [facility] ENDOSCOPY       Allergies   Allergen Reactions    Codeine Itching    Penicillins Itching     Outpatient Medications Marked as Taking for the 2/24/25 encounter (Office Visit) with Jose Alberto Knight MD   Medication Sig Dispense Refill    minocycline (MINOCIN;DYNACIN) 50 MG capsule Take 1 capsule by mouth 2 times daily 60 capsule 1    atorvastatin (LIPITOR) 20 MG tablet TAKE 1 TABLET BY MOUTH EVERY NIGHT 90

## 2025-04-14 ENCOUNTER — TELEPHONE (OUTPATIENT)
Dept: CARDIOLOGY CLINIC | Age: 70
End: 2025-04-14

## 2025-04-14 DIAGNOSIS — E11.69 OBESITY, DIABETES, AND HYPERTENSION SYNDROME (HCC): ICD-10-CM

## 2025-04-14 DIAGNOSIS — E11.59 OBESITY, DIABETES, AND HYPERTENSION SYNDROME (HCC): ICD-10-CM

## 2025-04-14 DIAGNOSIS — E66.9 OBESITY, DIABETES, AND HYPERTENSION SYNDROME (HCC): ICD-10-CM

## 2025-04-14 DIAGNOSIS — I15.2 OBESITY, DIABETES, AND HYPERTENSION SYNDROME (HCC): ICD-10-CM

## 2025-04-14 NOTE — TELEPHONE ENCOUNTER
CARDIAC CLEARANCE     What type of procedure are you having?  Right knee replacement     Which physician is performing your procedure?  Dr. Cohn    When is your procedure scheduled?  TBD on clearance     Where are you having this procedure?  [facility]    Are you taking Blood Thinners?   If so what? (Name/dose/frequesncy)  Pt states she doesn't need to hold     Does the surgeon want you to stop your blood thinner?  If so for how long?    Are you having any new or worsening cardiac symptoms?     Phone Number and Contact Name for Physicians office:  [phone]    Fax number to send information:  [phone]    Cardiac History:  Last office visit with Cardiologist:  11/5/24  Cardiac Procedures:    Cardiac Testing:

## 2025-04-15 RX ORDER — CLONIDINE HYDROCHLORIDE 0.2 MG/1
0.2 TABLET ORAL 2 TIMES DAILY
Qty: 180 TABLET | Refills: 2 | Status: SHIPPED | OUTPATIENT
Start: 2025-04-15

## 2025-04-15 NOTE — TELEPHONE ENCOUNTER
Preoperative risk assessment    Patient is planning to have right knee replacement    Last office visit 11/5/2024    Hx complete heart blocker s/p PPM 4/24/2021

## 2025-04-15 NOTE — TELEPHONE ENCOUNTER
Medication:   Requested Prescriptions     Pending Prescriptions Disp Refills    cloNIDine (CATAPRES) 0.2 MG tablet [Pharmacy Med Name: CLONIDINE 0.2MG TABLETS] 180 tablet 2     Sig: TAKE 1 TABLET BY MOUTH TWICE DAILY        Last Filled:      Patient Phone Number: [phone] (home) [phone] (work)    Last appt: 2/3/2025   Next appt: Visit date not found    Last OARRS:       7/28/2015     8:23 AM   RX Monitoring   Periodic Controlled Substance Monitoring No signs of potential drug abuse or diversion identified.

## 2025-04-15 NOTE — TELEPHONE ENCOUNTER
Dr. Bui,     Dr. Cohn is requesting advisement on patient's cardiac risk to undergo Right Knee replacement.  No medications to hold.    Last seen in office on 11/9/2024 for management of her pacemaker, history of CHB.    She had cath in 2021 which revealed normal coronary arteries.  Last Echo 2021 LVEF 60%.    Repeat Echo was ordered at last office visit, and scheduled for 5/8/2025  Last remote device interrogation 2/5/2025, one episode of AT in January lasting 2 minutes, 10 seconds.    Please advise.    Thanks,  Petty Hernandez, RN

## 2025-05-05 ENCOUNTER — TELEPHONE (OUTPATIENT)
Dept: CARDIOLOGY CLINIC | Age: 70
End: 2025-05-05

## 2025-05-05 NOTE — TELEPHONE ENCOUNTER
Dr. Bui,    Please advise if you agree new onset atrial fibrillation on device. (6 min 55 sec)    Not currently on anticoagulation.    Elevated [id number] Score 4 (HTN, DM, AGE, GENDER).    History of CHB s/p PPM 2021.     Creatine 0.9  Weight 101.6 kg  AGE 69      Please advise.    Thanks,  Petty Hernandez, RN

## 2025-05-05 NOTE — TELEPHONE ENCOUNTER
Patients pacemaker remote shows 6 mins and 55 seconds of AF. The waveform was suspended so you are unable to see the full AF event. Pt is not on anti coags. Report sent to Murj for review. Thanks.

## 2025-05-06 NOTE — TELEPHONE ENCOUNTER
Spoke with patient advised of new onset atrial fibrillation seen on device.  Educated on atrial fibrillation.    I discussed need for  oral anticoagulation to decrease the risk of stroke.   Patient agreeable to start.  RX sent to [facility]    Follow up with Dr Mayen scheduled for Thursday 5/8/2025 at 9 am.

## 2025-05-06 NOTE — TELEPHONE ENCOUNTER
Cameron Bui MD  You3 hours ago (8:07 AM)       Agree with AF diagnosis, > 6 min, should be placed on anticoagulation, eliquis 5mg BID, happy to call her if needed.    Thanks    Ramya

## 2025-05-07 NOTE — PROGRESS NOTES
[facility]   Electrophysiology Follow up     Date: 5/7/2025    CC: New onset atrial fibrillation    HPI:  Trino Weller is a 69 y.o. female with a history of hypertension, diabetes, complete heart block status post dual-chamber pacemaker 4/27/2021 (Marlo), device interrogation 5/6/2025 with evidence of new onset atrial fibrillation on 4/8/2025 lasting approximately 6 minutes and 55 seconds, contacted patient by phone, started Eliquis 5 mg twice daily    Patient presents today as follow-up for the management of device and atrial fibrillation.  She denies heart rates or palpitations, has had no recurrences of atrial fibrillation on device.  Tolerating Eliquis well.  She is planning on getting her echo today.    Review of System:  Complete 10 point ROS performed and negative unless noted in above HPI or below    Prior to Admission medications    Medication Sig Start Date End Date Taking? Authorizing Provider   apixaban (ELIQUIS) 5 MG TABS tablet Take 1 tablet by mouth 2 times daily 5/6/25   Cameron Bui MD   cloNIDine (CATAPRES) 0.2 MG tablet TAKE 1 TABLET BY MOUTH TWICE DAILY 4/15/25   Leslee Solis MD   minocycline (MINOCIN;DYNACIN) 50 MG capsule Take 1 capsule by mouth 2 times daily 2/24/25   Jose Alberto Knight MD   atorvastatin (LIPITOR) 20 MG tablet TAKE 1 TABLET BY MOUTH EVERY NIGHT 2/20/25   Sara Rosario DO   albuterol sulfate HFA (PROVENTIL HFA) 108 (90 Base) MCG/ACT inhaler Inhale 2 puffs into the lungs every 6 hours as needed for Wheezing 2/3/25   Sara Rosario DO   oxaprozin (DAYPRO) 600 MG tablet TAKE 1 TABLET BY MOUTH EVERY DAY 12/16/24   Sara Rosario DO   glimepiride (AMARYL) 4 MG tablet TAKE 1 TABLET BY MOUTH EVERY MORNING 11/25/24   Sara Rosario DO   valsartan-hydroCHLOROthiazide (DIOVAN-HCT) 320-25 MG per tablet TAKE 1 TABLET BY MOUTH EVERY DAY 11/18/24   Sara Rosario DO   amLODIPine (NORVASC) 5 MG tablet TAKE 1 TABLET BY MOUTH EVERY DAY 9/11/24

## 2025-05-08 ENCOUNTER — HOSPITAL ENCOUNTER (OUTPATIENT)
Age: 70
Discharge: HOME | End: 2025-05-10
Attending: INTERNAL MEDICINE
Payer: MEDICARE

## 2025-05-08 ENCOUNTER — OFFICE VISIT (OUTPATIENT)
Dept: CARDIOLOGY CLINIC | Age: 70
End: 2025-05-08
Payer: MEDICARE

## 2025-05-08 ENCOUNTER — CLINICAL SUPPORT (OUTPATIENT)
Dept: CARDIOLOGY CLINIC | Age: 70
End: 2025-05-08

## 2025-05-08 VITALS
SYSTOLIC BLOOD PRESSURE: 136 MMHG | HEIGHT: 62 IN | WEIGHT: 229 LBS | BODY MASS INDEX: 42.14 KG/M2 | DIASTOLIC BLOOD PRESSURE: 84 MMHG

## 2025-05-08 VITALS
HEIGHT: 62 IN | HEART RATE: 80 BPM | DIASTOLIC BLOOD PRESSURE: 84 MMHG | OXYGEN SATURATION: 97 % | SYSTOLIC BLOOD PRESSURE: 136 MMHG | WEIGHT: 229 LBS | BODY MASS INDEX: 42.14 KG/M2

## 2025-05-08 DIAGNOSIS — Z95.0 PACEMAKER: ICD-10-CM

## 2025-05-08 DIAGNOSIS — I44.2 COMPLETE HEART BLOCK (HCC): ICD-10-CM

## 2025-05-08 DIAGNOSIS — Z95.0 PRESENCE OF CARDIAC PACEMAKER: ICD-10-CM

## 2025-05-08 DIAGNOSIS — I48.91 NEW ONSET ATRIAL FIBRILLATION (HCC): Primary | ICD-10-CM

## 2025-05-08 DIAGNOSIS — I48.0 PAROXYSMAL ATRIAL FIBRILLATION (HCC): ICD-10-CM

## 2025-05-08 LAB
ECHO AO ASC DIAM: 3.8 CM
ECHO AO ASCENDING AORTA INDEX: 1.88 CM/M2
ECHO AO ROOT DIAM: 3.3 CM
ECHO AO ROOT INDEX: 1.63 CM/M2
ECHO AV CUSP MM: 1.7 CM
ECHO BSA: 2.13 M2
ECHO EST RA PRESSURE: 3 MMHG
ECHO LA AREA 2C: 18 CM2
ECHO LA AREA 4C: 23.6 CM2
ECHO LA DIAMETER INDEX: 1.88 CM/M2
ECHO LA DIAMETER: 3.8 CM
ECHO LA MAJOR AXIS: 5.7 CM
ECHO LA MINOR AXIS: 5.4 CM
ECHO LA TO AORTIC ROOT RATIO: 1.15
ECHO LA VOL BP: 63 ML (ref 22–52)
ECHO LA VOL MOD A2C: 49 ML (ref 22–52)
ECHO LA VOL MOD A4C: 81 ML (ref 22–52)
ECHO LA VOL/BSA BIPLANE: 31 ML/M2 (ref 16–34)
ECHO LA VOLUME INDEX MOD A2C: 24 ML/M2 (ref 16–34)
ECHO LA VOLUME INDEX MOD A4C: 40 ML/M2 (ref 16–34)
ECHO LV EDV A4C: 92 ML
ECHO LV EDV INDEX A4C: 46 ML/M2
ECHO LV EF PHYSICIAN: 48 %
ECHO LV EJECTION FRACTION A4C: 56 %
ECHO LV ESV A4C: 41 ML
ECHO LV ESV INDEX A4C: 20 ML/M2
ECHO LV FRACTIONAL SHORTENING: 27 % (ref 28–44)
ECHO LV INTERNAL DIMENSION DIASTOLE INDEX: 2.38 CM/M2
ECHO LV INTERNAL DIMENSION DIASTOLIC: 4.8 CM (ref 3.9–5.3)
ECHO LV INTERNAL DIMENSION SYSTOLIC INDEX: 1.73 CM/M2
ECHO LV INTERNAL DIMENSION SYSTOLIC: 3.5 CM
ECHO LV IVSD: 1.1 CM (ref 0.6–0.9)
ECHO LV MASS 2D: 206.4 G (ref 67–162)
ECHO LV MASS INDEX 2D: 102.2 G/M2 (ref 43–95)
ECHO LV POSTERIOR WALL DIASTOLIC: 1.2 CM (ref 0.6–0.9)
ECHO LV RELATIVE WALL THICKNESS RATIO: 0.5
ECHO LVOT AREA: 3.1 CM2
ECHO LVOT DIAM: 2 CM
ECHO RA AREA 4C: 17.7 CM2
ECHO RA END SYSTOLIC VOLUME APICAL 4 CHAMBER INDEX BSA: 24 ML/M2
ECHO RA VOLUME: 49 ML
ECHO RV INTERNAL DIMENSION: 3.3 CM

## 2025-05-08 PROCEDURE — 93325 DOPPLER ECHO COLOR FLOW MAPG: CPT

## 2025-05-08 PROCEDURE — 3079F DIAST BP 80-89 MM HG: CPT | Performed by: INTERNAL MEDICINE

## 2025-05-08 PROCEDURE — 1159F MED LIST DOCD IN RCRD: CPT | Performed by: INTERNAL MEDICINE

## 2025-05-08 PROCEDURE — G8417 CALC BMI ABV UP PARAM F/U: HCPCS | Performed by: INTERNAL MEDICINE

## 2025-05-08 PROCEDURE — 99214 OFFICE O/P EST MOD 30 MIN: CPT | Performed by: INTERNAL MEDICINE

## 2025-05-08 PROCEDURE — 93000 ELECTROCARDIOGRAM COMPLETE: CPT | Performed by: INTERNAL MEDICINE

## 2025-05-08 PROCEDURE — G8427 DOCREV CUR MEDS BY ELIG CLIN: HCPCS | Performed by: INTERNAL MEDICINE

## 2025-05-08 PROCEDURE — 3017F COLORECTAL CA SCREEN DOC REV: CPT | Performed by: INTERNAL MEDICINE

## 2025-05-08 PROCEDURE — 3075F SYST BP GE 130 - 139MM HG: CPT | Performed by: INTERNAL MEDICINE

## 2025-05-08 PROCEDURE — 1123F ACP DISCUSS/DSCN MKR DOCD: CPT | Performed by: INTERNAL MEDICINE

## 2025-05-08 PROCEDURE — 1090F PRES/ABSN URINE INCON ASSESS: CPT | Performed by: INTERNAL MEDICINE

## 2025-05-08 PROCEDURE — G2211 COMPLEX E/M VISIT ADD ON: HCPCS | Performed by: INTERNAL MEDICINE

## 2025-05-08 PROCEDURE — G8399 PT W/DXA RESULTS DOCUMENT: HCPCS | Performed by: INTERNAL MEDICINE

## 2025-05-08 PROCEDURE — 1036F TOBACCO NON-USER: CPT | Performed by: INTERNAL MEDICINE

## 2025-05-08 RX ORDER — GABAPENTIN 300 MG/1
300 CAPSULE ORAL 3 TIMES DAILY
COMMUNITY

## 2025-05-08 NOTE — PATIENT INSTRUCTIONS
Electrophysiology (EP) study    Overview  An electrophysiology (EP) study is a series of tests that examine the heart's electrical activity. It's also called an invasive cardiac electrophysiology test.  The heart's electrical system produces signals that control the timing of the heartbeats. During an EP study, heart doctors, called cardiologists, can create a very detailed map of how these signals move between each heartbeat.  An EP study can help determine the cause of irregular heart rhythms, called arrhythmias. Sometimes it's done to predict the risk of sudden cardiac death.  An EP study is done in a hospital by doctors with special training in heart rhythm disorders. These healthcare professionals are called electrophysiologists.    Why it's done  An EP study gives your healthcare team a very detailed look at how electrical signals move through the heart. You may need an EP study if:  You have an irregular heart rhythm, called an arrhythmia. If you have an irregular or fast heartbeat, such as supraventricular tachycardia (SVT) or any other type of tachycardia, an EP study can help determine the best treatment.  You fainted. If you had a sudden loss of consciousness, an EP study can help determine the cause.  You're at risk of sudden cardiac death. If you have certain heart conditions, an EP study can help determine your risk of sudden cardiac death.  You need a treatment called cardiac ablation. Cardiac ablation uses heat or cold energy to correct irregular heart rhythms. An EP study is always done before cardiac ablation to find the area of the irregular heart rhythm. If you're having heart surgery, you may have cardiac ablation and an EP study on the same day.

## 2025-05-09 ENCOUNTER — RESULTS FOLLOW-UP (OUTPATIENT)
Dept: CARDIOLOGY CLINIC | Age: 70
End: 2025-05-09

## 2025-05-12 ENCOUNTER — TELEPHONE (OUTPATIENT)
Dept: CARDIOLOGY CLINIC | Age: 70
End: 2025-05-12

## 2025-05-12 DIAGNOSIS — I50.20 HFREF (HEART FAILURE WITH REDUCED EJECTION FRACTION) (HCC): Primary | ICD-10-CM

## 2025-05-12 NOTE — TELEPHONE ENCOUNTER
Please reach  out to patient and schedule upgrade of device to BIV PPM with Dr. Bui at [facility].     See EP case request.      Upgrade to BIV PPM  Pre procedure Instructions    Date:______________________________    Arrive at:__________________________    Procedure time:____________________    The morning of your procedure you will park in the hospital parking lot and report directly to the cath lab to check in.   At the information desk stay right and go all the way to the end of the concepcion, this will take you directly to your check in desk for the cath lab.    Pre-Procedure Instructions   You will need to fast (nothing to eat or drink) after midnight the day of your procedure  Do NOT chew gum or eat mints the day of your procedure.  You will need to hold your Eliquis, for 2 days prior to the procedure.  All other medications may be taken the morning of the procedure with sips of water.  You will need to hold all diabetic medications including, Metformin, glimepiride (AMARYL) the morning of the procedure.    Do not use any lotions, creams or perfume the morning of procedure.   You will need to complete pre-procedure lab work 5-7 days prior to your procedure.  Please have a responsible adult to drive you home after procedure, you should go home same day, but there is always a possibility of an overnight stay.  Cath lab will provide you with all post procedure instructions                                          [facility]     [facility]  [address]  [address]   [place] [address]  Phone: [phone]  The hours are Mon -Fri.  6:30 am - 4:00 pm   Saturday 8:00 am - noon  No appointment necessary

## 2025-05-13 PROBLEM — I50.20 HFREF (HEART FAILURE WITH REDUCED EJECTION FRACTION) (HCC): Status: ACTIVE | Noted: 2025-05-12

## 2025-05-13 NOTE — TELEPHONE ENCOUNTER
Date of Procedure: Monday 5/19/25 @ [facility] with Dr. Bui     Time of arrival: 6:30 am     Procedure time: 7:30 am     Called and spoke to Trino and she is agreeable to date and time. Reviewed and emailed Trino her procedure date, time and instructions and she verbalized understanding. Encouraged to call with any questions or concerns.     Published on [employer] and e-mail to Mikala.

## 2025-05-14 ENCOUNTER — HOSPITAL ENCOUNTER (OUTPATIENT)
Age: 70
Discharge: HOME | End: 2025-05-14
Payer: MEDICARE

## 2025-05-14 DIAGNOSIS — I50.20 HFREF (HEART FAILURE WITH REDUCED EJECTION FRACTION) (HCC): ICD-10-CM

## 2025-05-14 LAB
ANION GAP SERPL CALCULATED.3IONS-SCNC: 10 MMOL/L (ref 3–16)
BUN SERPL-MCNC: 31 MG/DL (ref 7–20)
CALCIUM SERPL-MCNC: 10.6 MG/DL (ref 8.3–10.6)
CHLORIDE SERPL-SCNC: 100 MMOL/L (ref 99–110)
CO2 SERPL-SCNC: 29 MMOL/L (ref 21–32)
CREAT SERPL-MCNC: 1.5 MG/DL (ref 0.6–1.2)
DEPRECATED RDW RBC AUTO: 16.2 % (ref 12.4–15.4)
GFR SERPLBLD CREATININE-BSD FMLA CKD-EPI: 37 ML/MIN/{1.73_M2}
GLUCOSE SERPL-MCNC: 136 MG/DL (ref 70–99)
HCT VFR BLD AUTO: 34.2 % (ref 36–48)
HGB BLD-MCNC: 11.2 G/DL (ref 12–16)
MCH RBC QN AUTO: 27.9 PG (ref 26–34)
MCHC RBC AUTO-ENTMCNC: 32.9 G/DL (ref 31–36)
MCV RBC AUTO: 84.9 FL (ref 80–100)
PLATELET # BLD AUTO: 242 K/UL (ref 135–450)
PMV BLD AUTO: 9.6 FL (ref 5–10.5)
POTASSIUM SERPL-SCNC: 4.3 MMOL/L (ref 3.5–5.1)
RBC # BLD AUTO: 4.02 M/UL (ref 4–5.2)
SODIUM SERPL-SCNC: 139 MMOL/L (ref 136–145)
WBC # BLD AUTO: 7.4 K/UL (ref 4–11)

## 2025-05-14 PROCEDURE — 80048 BASIC METABOLIC PNL TOTAL CA: CPT

## 2025-05-14 PROCEDURE — 36415 COLL VENOUS BLD VENIPUNCTURE: CPT

## 2025-05-14 PROCEDURE — 85027 COMPLETE CBC AUTOMATED: CPT

## 2025-05-19 ENCOUNTER — TELEPHONE (OUTPATIENT)
Dept: CARDIOLOGY CLINIC | Age: 70
End: 2025-05-19

## 2025-05-19 ENCOUNTER — HOSPITAL ENCOUNTER (OUTPATIENT)
Age: 70
Setting detail: OUTPATIENT SURGERY
Discharge: HOME | End: 2025-05-19
Attending: INTERNAL MEDICINE | Admitting: INTERNAL MEDICINE
Payer: MEDICARE

## 2025-05-19 ENCOUNTER — APPOINTMENT (OUTPATIENT)
Dept: GENERAL RADIOLOGY | Age: 70
End: 2025-05-19
Attending: INTERNAL MEDICINE
Payer: MEDICARE

## 2025-05-19 VITALS
HEIGHT: 63 IN | OXYGEN SATURATION: 98 % | RESPIRATION RATE: 22 BRPM | WEIGHT: 230 LBS | TEMPERATURE: 98.6 F | SYSTOLIC BLOOD PRESSURE: 125 MMHG | BODY MASS INDEX: 40.75 KG/M2 | HEART RATE: 64 BPM | DIASTOLIC BLOOD PRESSURE: 87 MMHG

## 2025-05-19 DIAGNOSIS — I50.20 HFREF (HEART FAILURE WITH REDUCED EJECTION FRACTION) (HCC): ICD-10-CM

## 2025-05-19 LAB
ECHO BSA: 2.15 M2
EKG ATRIAL RATE: 62 BPM
EKG ATRIAL RATE: 68 BPM
EKG DIAGNOSIS: NORMAL
EKG DIAGNOSIS: NORMAL
EKG P AXIS: 44 DEGREES
EKG P AXIS: 49 DEGREES
EKG P-R INTERVAL: 136 MS
EKG P-R INTERVAL: 174 MS
EKG Q-T INTERVAL: 412 MS
EKG Q-T INTERVAL: 466 MS
EKG QRS DURATION: 132 MS
EKG QRS DURATION: 94 MS
EKG QTC CALCULATION (BAZETT): 438 MS
EKG QTC CALCULATION (BAZETT): 472 MS
EKG R AXIS: 0 DEGREES
EKG R AXIS: 143 DEGREES
EKG T AXIS: -78 DEGREES
EKG T AXIS: -79 DEGREES
EKG VENTRICULAR RATE: 62 BPM
EKG VENTRICULAR RATE: 68 BPM

## 2025-05-19 PROCEDURE — 33229 REMV&REPLC PM GEN MULT LEADS: CPT | Performed by: INTERNAL MEDICINE

## 2025-05-19 PROCEDURE — C2621 PMKR, OTHER THAN SING/DUAL: HCPCS | Performed by: INTERNAL MEDICINE

## 2025-05-19 PROCEDURE — 99152 MOD SED SAME PHYS/QHP 5/>YRS: CPT | Performed by: INTERNAL MEDICINE

## 2025-05-19 PROCEDURE — 2720000010 HC SURG SUPPLY STERILE: Performed by: INTERNAL MEDICINE

## 2025-05-19 PROCEDURE — 2709999900 HC NON-CHARGEABLE SUPPLY: Performed by: INTERNAL MEDICINE

## 2025-05-19 PROCEDURE — 2580000003 HC RX 258: Performed by: INTERNAL MEDICINE

## 2025-05-19 PROCEDURE — 99153 MOD SED SAME PHYS/QHP EA: CPT | Performed by: INTERNAL MEDICINE

## 2025-05-19 PROCEDURE — 93010 ELECTROCARDIOGRAM REPORT: CPT | Performed by: INTERNAL MEDICINE

## 2025-05-19 PROCEDURE — 6360000002 HC RX W HCPCS: Performed by: INTERNAL MEDICINE

## 2025-05-19 PROCEDURE — C1894 INTRO/SHEATH, NON-LASER: HCPCS | Performed by: INTERNAL MEDICINE

## 2025-05-19 PROCEDURE — 33225 L VENTRIC PACING LEAD ADD-ON: CPT | Performed by: INTERNAL MEDICINE

## 2025-05-19 PROCEDURE — C1889 IMPLANT/INSERT DEVICE, NOC: HCPCS | Performed by: INTERNAL MEDICINE

## 2025-05-19 PROCEDURE — 7100000011 HC PHASE II RECOVERY - ADDTL 15 MIN: Performed by: INTERNAL MEDICINE

## 2025-05-19 PROCEDURE — C1892 INTRO/SHEATH,FIXED,PEEL-AWAY: HCPCS | Performed by: INTERNAL MEDICINE

## 2025-05-19 PROCEDURE — 93005 ELECTROCARDIOGRAM TRACING: CPT | Performed by: INTERNAL MEDICINE

## 2025-05-19 PROCEDURE — C1769 GUIDE WIRE: HCPCS | Performed by: INTERNAL MEDICINE

## 2025-05-19 PROCEDURE — 71046 X-RAY EXAM CHEST 2 VIEWS: CPT

## 2025-05-19 PROCEDURE — 7100000010 HC PHASE II RECOVERY - FIRST 15 MIN: Performed by: INTERNAL MEDICINE

## 2025-05-19 PROCEDURE — C1898 LEAD, PMKR, OTHER THAN TRANS: HCPCS | Performed by: INTERNAL MEDICINE

## 2025-05-19 PROCEDURE — 33207 INSERT HEART PM VENTRICULAR: CPT | Performed by: INTERNAL MEDICINE

## 2025-05-19 DEVICE — IMPLANTABLE DEVICE: Type: IMPLANTABLE DEVICE | Status: FUNCTIONAL

## 2025-05-19 DEVICE — LEAD PACE 4.1FR L69CM ATR VENT TRNSVEN SIL POLYUR INSULATOR: Type: IMPLANTABLE DEVICE | Status: FUNCTIONAL

## 2025-05-19 DEVICE — ENVELOPE CMRM6133 ABSORB LRG MR
Type: IMPLANTABLE DEVICE | Status: FUNCTIONAL
Brand: TYRX™

## 2025-05-19 RX ORDER — BUPIVACAINE HYDROCHLORIDE 5 MG/ML
INJECTION, SOLUTION EPIDURAL; INTRACAUDAL PRN
Status: DISCONTINUED | OUTPATIENT
Start: 2025-05-19 | End: 2025-05-19 | Stop reason: HOSPADM

## 2025-05-19 RX ORDER — SODIUM CHLORIDE 0.9 % (FLUSH) 0.9 %
5-40 SYRINGE (ML) INJECTION PRN
Status: DISCONTINUED | OUTPATIENT
Start: 2025-05-19 | End: 2025-05-21 | Stop reason: HOSPADM

## 2025-05-19 RX ORDER — DIPHENHYDRAMINE HYDROCHLORIDE 50 MG/ML
INJECTION, SOLUTION INTRAMUSCULAR; INTRAVENOUS PRN
Status: DISCONTINUED | OUTPATIENT
Start: 2025-05-19 | End: 2025-05-19 | Stop reason: HOSPADM

## 2025-05-19 RX ORDER — MIDAZOLAM 1 MG/ML
INJECTION INTRAMUSCULAR; INTRAVENOUS PRN
Status: DISCONTINUED | OUTPATIENT
Start: 2025-05-19 | End: 2025-05-19 | Stop reason: HOSPADM

## 2025-05-19 RX ORDER — SODIUM CHLORIDE 0.9 % (FLUSH) 0.9 %
5-40 SYRINGE (ML) INJECTION EVERY 12 HOURS SCHEDULED
Status: DISCONTINUED | OUTPATIENT
Start: 2025-05-19 | End: 2025-05-21 | Stop reason: HOSPADM

## 2025-05-19 RX ORDER — SODIUM CHLORIDE 9 MG/ML
INJECTION, SOLUTION INTRAVENOUS PRN
Status: DISCONTINUED | OUTPATIENT
Start: 2025-05-19 | End: 2025-05-21 | Stop reason: HOSPADM

## 2025-05-19 NOTE — PROGRESS NOTES
Patients incision is healing nicely. Outside dressing removed today. Wound clean, dry and intact. Incision well approximated. Small hematoma noted. Advised Patient to apply ice 2-3 daily. No S/S of infection. Not warm, no redness, no drainage. Patient and family educated on wound care. All questions answered.  Patient to call the office immediately with any signs on infection.     Home Monitor/[employer] Renita connected and transmitting. Instructions given.

## 2025-05-19 NOTE — PROGRESS NOTES
Patient returns from procedure and xray    Dressing has small amount of drainage noted, will continue to monitor    EKG complete with studies done by [employer] per Dr Bui, Dr Bui at bedside during this    Pt eating and drinking, tolerating well    IV d/c'd, angio intact    Discharge instructions reviewed with patient and family member.  Patient and family verbalized understanding.  New medications have been reviewed, questions answered and patient voiced understanding. All medication side effects reviewed and patient and family verbalized understanding. Follow up appointment(s) reviewed with patient and family.       Patient given discharge instructions, and appointment times. Taken to discharge bridge via wheelchair.  Patient discharged to home with family

## 2025-05-19 NOTE — H&P
[facility]          Electrophysiology H&P Note       Date of Procedure: 5/19/2025  Patient's Name: Trino Weller  YOB: 1955   Medical Record Number: [id number]    Indication:  Device upgrade for reduced LVEF in setting of high  burden  Addition of LB or CS lead     Consent:   I have discussed with the patient and/or the patient representative the indication, alternatives, and the possible risks and/or complications of the planned procedure and the anesthesia methods. The patient and/or patient representative appear to understand and agree to proceed.    Past Medical History:   Diagnosis Date    Complete heart block (HCC) 4/23/2021    Diabetes mellitus (HCC)     GERD (gastroesophageal reflux disease)     Hyperlipidemia     Hypertension     Obesity     S/P hip replacement     Sleep apnea        Past Surgical History:   Procedure Laterality Date    COLONOSCOPY  9/28/2021    COLONOSCOPY POLYPECTOMY SNARE/COLD BIOPSY performed by Shanelle Byrd MD at [facility] ENDOSCOPY    JOINT REPLACEMENT      TOTAL HIP ARTHROPLASTY Left Oct '13    UPPER GASTROINTESTINAL ENDOSCOPY N/A 9/28/2021    EGD BIOPSY performed by Shanelle Byrd MD at [facility] ENDOSCOPY    UPPER GASTROINTESTINAL ENDOSCOPY N/A 10/13/2021    ESOPHAGOGASTRODUODENOSCOPY RADIOFREQUENCY ABLATION performed by Shanelle Byrd MD at [facility] ENDOSCOPY    UPPER GASTROINTESTINAL ENDOSCOPY N/A 10/13/2021    EGD BIOPSY performed by Shanelle Byrd MD at [facility] ENDOSCOPY       Prior to Admission medications    Medication Sig Start Date End Date Taking? Authorizing Provider   gabapentin (NEURONTIN) 300 MG capsule Take 1 capsule by mouth 3 times daily.   Yes ProviderNba MD   cloNIDine (CATAPRES) 0.2 MG tablet TAKE 1 TABLET BY MOUTH TWICE DAILY 4/15/25  Yes Leslee Solis MD   minocycline (MINOCIN;DYNACIN) 50 MG capsule Take 1 capsule by mouth 2 times daily 2/24/25  Yes Jose Alberto Knight MD   atorvastatin (LIPITOR) 20 MG tablet TAKE 1 TABLET BY MOUTH EVERY  [phone]

## 2025-05-19 NOTE — DISCHARGE INSTRUCTIONS
ICD/PACEMAKER PLACEMENT DISCHARGE INSTRUCTIONS        No Driving for 2 days.  We strongly recommend that a responsible adult stay with you for the next 24 hours.  Do not make important / legal decisions within 24 hours post procedure.  Do not lift more than 25 pounds for 4 weeks.  Do not lift affected arm above head for 4 weeks.  Leave dressing in place for one week.    Do not get the incision wet for one week.  Remove pressure dressing 24 hours after procedure.  It is normal for site to be sore, bruised,  and/ or have a small amount of drainage around the incision site.    Please contact the office if you notice any signs of infection:  Redness / swelling at the incision site  Fever  Yellow drainage at the site  Pain    Follow up appointment:   Phone: [phone]

## 2025-05-19 NOTE — PATIENT INSTRUCTIONS
New Cardiac Device Implant (Pacemaker and/or Defibrillator) Post Op Instructions  Bathe with water indirectly hitting the incision site. Water and soap may run over the incision site. Do not scrub. Pat dry with a clean towel after bathing.   Leave incision open to the air; do not apply any dressings, ointments, or bandages to the area. Do not apply lotion, perfume/cologne, or powders to the area until it is completely healed.   Any scabbing or skin glue that is noted will fall off within 1-2 weeks after the post op appointment.   If any oozing, bleeding, or pus drainage occurs, please call the office immediately at [phone].        Patient has movement restrictions in place until 4 weeks post op (to the day of implant) unless otherwise instructed by physician.   Patient may not lift the device side arm above shoulder height.   Do not far reach or stretch across body or behind body with effected side.   Do not use this arm for pushing, pulling, or lifting body.   Do not use cane on the effected side.   Patient may not lift anything heavier than a gallon of milk with the associated arm.     Appointments to expect going forward:  Post operatively the patient will have had a 1-week post op check and a 3 month follow up with NP/MD and the device clinic.        Remote Monitoring Instructions     Within 2-3 weeks of your device being implanted, you will receive a call from the  of your device. Please answer this call as it is to set up remote monitoring for your device. Once you receive your in-home monitor, please follow the instructions provided to sync the home monitor to your implanted device. Once you have paired your home monitor to your implanted device, keep your monitor plugged in within 6 feet of where you sleep. Your monitor will routinely check in with your device during sleep hours and transmit any urgent events to the Device Clinic for review.     Please do not send additional routine

## 2025-05-19 NOTE — TELEPHONE ENCOUNTER
Patients daughter Komal called in.   She states Trino had a Biventricular pacemaker upgrade done today 5/19.   She is having trouble getting her Renita to work.   They asked for help in the hospital but were told it would work once they got home. However, it is still not pairing and the patient is upset.     Please advise.     Callback: [phone]

## 2025-05-19 NOTE — TELEPHONE ENCOUNTER
Called and spoke with daughter. I was unable to troubleshoot so I gave her [employer] stay connected number [phone]. Will make sure we receive transmission tomorrow.

## 2025-05-20 NOTE — TELEPHONE ENCOUNTER
Please call Patients daughter and let her know [employer] Renita is connected and we have received the initial transmission. Thanks

## 2025-05-23 ENCOUNTER — TELEPHONE (OUTPATIENT)
Dept: CARDIOLOGY CLINIC | Age: 70
End: 2025-05-23

## 2025-05-23 NOTE — TELEPHONE ENCOUNTER
Trino called the office to relay that she had an episode of nausea and vomiting this morning and wants to make sure it is not from her surgery she had on 05/19. She states it has since subsided.    Patient would also like clarity on what the compressor is that she was instructed to take off after her surgery.    Please advise.    Trino's callback: [phone]

## 2025-05-27 DIAGNOSIS — E66.9 OBESITY, DIABETES, AND HYPERTENSION SYNDROME (HCC): ICD-10-CM

## 2025-05-27 DIAGNOSIS — E11.69 OBESITY, DIABETES, AND HYPERTENSION SYNDROME (HCC): ICD-10-CM

## 2025-05-27 DIAGNOSIS — E11.59 OBESITY, DIABETES, AND HYPERTENSION SYNDROME (HCC): ICD-10-CM

## 2025-05-27 DIAGNOSIS — I15.2 OBESITY, DIABETES, AND HYPERTENSION SYNDROME (HCC): ICD-10-CM

## 2025-05-27 NOTE — TELEPHONE ENCOUNTER
Spoke with patient she reports that she has a square white dressing overtop of the wound. They way described the dressing it did not sound like it was compression dressing.  Advised that we would remove the dressing when she comes into the office tomorrow for post op site check. Verbalized understanding.

## 2025-05-27 NOTE — TELEPHONE ENCOUNTER
Subjective:     Patient ID: Chau Price is a 67 y.o. male.    Chief Complaint: Post-op Evaluation  Patient presents to clinic 1 week S/P resection of a bone spur along the base of the Rt. 5th metatarsal.  Notes having mild pain at the surgical site with weight bearing, however, notes incremental improvement each day.  Reports discontinuing pain medication as of Sunday.  States increasing the frequency of said medication finally resulted in a breakthrough in pain.  Has kept the prior surgical dressing clean, dry, and intact for the past week.  Notes minimizing ambulation while in the postoperative shoe.  Denies experiencing N/V/F/C/D.  Denies experiencing chest pain, SOB, and calf/thigh pain.  Denies any additional pedal complaints.        Past Medical History:   Diagnosis Date    Anticoagulant long-term use     Arthritis     Glaucoma     Hypertension     Other ulcerative colitis with rectal bleeding     Sleep apnea     USES C-PAP       Past Surgical History:   Procedure Laterality Date    BACK SURGERY  2008 2008-2017; cervical fusion x2 & lumbar fusion x3    INCONTINENCE SURGERY      Urolift    LEFT HEART CATHETERIZATION  9/21/2023    Procedure: Left heart cath;  Surgeon: Lakeisha Mcdaniel MD;  Location: [facility] CATH;  Service: Cardiology;;    REPAIR OF MENISCUS OF KNEE Left 2010    ROTATOR CUFF REPAIR Right 1997    SURGICAL REMOVAL OF BONE SPUR Right 2/28/2024    Procedure: EXCISION, BONE SPUR- 5th metatarsal;  Surgeon: Emmanuel Liao DPM;  Location: [facility] OR;  Service: Podiatry;  Laterality: Right;    TRANSFORAMINAL EPIDURAL INJECTION OF STEROID Left 10/21/2022    Procedure: Injection,steroid,epidural,transforaminal approach L4/5+L5/S1;  Surgeon: Diogo Travis MD;  Location: [facility] OR;  Service: Pain Management;  Laterality: Left;       Family History   Problem Relation Age of Onset    Cancer Mother         lung    Glaucoma Father     Cancer Father         bladder    Macular degeneration Neg Hx   Trino returned call for Petty.         Social History     Socioeconomic History    Marital status:    Tobacco Use    Smoking status: Former     Types: Cigars    Smokeless tobacco: Never   Substance and Sexual Activity    Alcohol use: Not Currently     Comment: social       Current Outpatient Medications   Medication Sig Dispense Refill    amitriptyline (ELAVIL) 25 MG tablet Take 1 tablet (25 mg total) by mouth every evening. 90 tablet 3    ascorbic acid, vitamin C, (VITAMIN C) 500 MG tablet Take 500 mg by mouth once daily.      aspirin (ECOTRIN) 81 MG EC tablet Take 1 tablet (81 mg total) by mouth once daily. 90 tablet 3    augmented betamethasone dipropionate (DIPROLENE-AF) 0.05 % cream Apply topically 2 (two) times daily as needed. 50 g 3    clobetasoL (TEMOVATE) 0.05 % cream Apply topically.      diltiaZEM (DILACOR XR) 120 MG CDCR Take 1 capsule (120 mg total) by mouth once daily. 90 capsule 3    fexofenadine (ALLEGRA) 180 MG tablet Take 180 mg by mouth once daily.      fluticasone propionate (CUTIVATE) 0.05 % cream Apply topically once daily. 60 g 0    HYDROcodone-acetaminophen (NORCO) 5-325 mg per tablet Take 1 tablet by mouth every 4 (four) hours as needed for Pain. 42 tablet 0    HYDROcodone-acetaminophen (NORCO) 7.5-325 mg per tablet Take 1 tablet by mouth every 8 (eight) hours as needed for Pain. 21 tablet 0    hydrocortisone butyrate (LOCOID) 0.1 % Lotn Apply 1 Application topically daily as needed. 59 mL 3    latanoprost 0.005 % ophthalmic solution INSTILL 1 DROP INTO EACH EYE IN THE EVENING (Patient taking differently: Place 1 drop into both eyes every evening. INSTILL 1 DROP INTO EACH EYE IN THE EVENING) 9 mL 3    losartan (COZAAR) 50 MG tablet Take 1 tablet (50 mg total) by mouth once daily. 90 tablet 3    mesalamine (LIALDA) 1.2 gram TbEC Take 4 tablets (4.8 g total) by mouth daily with breakfast. 360 tablet 4    methocarbamoL (ROBAXIN) 750 MG Tab Take 1 tablet (750 mg total) by mouth 4 (four) times daily as needed. 270  "tablet 3    metoprolol succinate (TOPROL-XL) 25 MG 24 hr tablet Take 1 tablet (25 mg total) by mouth once daily. 90 tablet 3    naproxen (NAPROSYN) 500 MG tablet Take 500 mg by mouth 2 (two) times daily as needed.      omega 3-dha-epa-fish oil (FISH OIL) 1,200 (144-216) mg Cap Take 1 capsule by mouth Daily.      ondansetron (ZOFRAN-ODT) 4 MG TbDL Take 1 tablet (4 mg total) by mouth every 6 (six) hours as needed. 10 tablet 1    pantoprazole (PROTONIX) 20 MG tablet Take 1 tablet (20 mg total) by mouth once daily. 90 tablet 2    traZODone (DESYREL) 50 MG tablet Take 1 tablet (50 mg total) by mouth every evening. 90 tablet 4     No current facility-administered medications for this visit.     Facility-Administered Medications Ordered in Other Visits   Medication Dose Route Frequency Provider Last Rate Last Admin    lactated ringers infusion   Intravenous Continuous Radha Bishop MD   New Bag at 02/28/24 1438    LIDOcaine (PF) 10 mg/ml (1%) injection 10 mg  1 mL Intradermal Once Radha Bishop MD           Review of patient's allergies indicates:   Allergen Reactions    Azathioprine Other (See Comments)     Megacolon, "colon almost burst"  Other reaction(s): Not available    Mercaptopurine Other (See Comments)     Megacolon, "colon almost burst"        Hemoglobin A1C   Date Value Ref Range Status   06/01/2023 5.0 0.0 - 5.6 % Final     Comment:     Reference Interval:  5.0 - 5.6 Normal   5.7 - 6.4 High Risk   > 6.5 Diabetic      Hgb A1c results are standardized based on the (NGSP) National   Glycohemoglobin Standardization Program.      Hemoglobin A1C levels are related to mean serum/plasma glucose   during the preceding 2-3 months.            Review of Systems   Constitutional: Negative for chills and fever.   Skin:  Negative for color change, nail changes and suspicious lesions.   Musculoskeletal:  Positive for myalgias. Negative for muscle cramps and muscle weakness.   Gastrointestinal:  Negative for nausea " and vomiting.   Neurological:  Negative for numbness and paresthesias.   Psychiatric/Behavioral:  Negative for altered mental status.         Objective:     Physical Exam  Constitutional:       Appearance: Normal appearance. He is not ill-appearing.   Cardiovascular:      Pulses:           Dorsalis pedis pulses are 2+ on the right side and 2+ on the left side.        Posterior tibial pulses are 2+ on the right side and 2+ on the left side.      Comments: CFT is < 3 seconds bilateral.  Pedal hair growth is present bilateral.  Moderate localized edema overlying the Rt. Styloid process.  Toes are warm to touch bilateral.    Musculoskeletal:         General: Tenderness present.      Comments: Muscle strength 5/5 in all muscle groups bilateral.  No tenderness nor crepitation with ROM of foot/ankle joints bilateral.  Pain with palpation to the incision overlying the Rt. Styloid process.  Prominent styloid process noted on the Lt.   Skin:     Capillary Refill: Capillary refill takes 2 to 3 seconds.      Findings: No bruising, ecchymosis, erythema, signs of injury, laceration, lesion, petechiae or rash.      Comments: Incision overlying the Rt. Styloid process is well approximated with all suture intact.  No evidence of dehiscence, necrosis, ischemia, or infection the length of the incision.     Neurological:      General: No focal deficit present.      Mental Status: He is alert.      Sensory: No sensory deficit.      Motor: No atrophy.      Comments: Light touch is intact bilateral.             Assessment:      Encounter Diagnosis   Name Primary?    Postoperative state Yes     Plan:     Chau was seen today for post-op evaluation.    Diagnoses and all orders for this visit:    Postoperative state      I counseled the patient on his conditions, their implications and medical management.    Overall, satisfactory postoperative results noted.  Incision remains well maintained with suture, with no evidence of postoperative  infection.     The incision site was painted with betadine, and a football dressing was applied     Advised to keep the dressing CDI x 1 week.     Instructed to minimize weight bearing to facilitate healing.     RTC in 1 week for suture removal.       Emmanuel Liao DPM

## 2025-05-28 ENCOUNTER — CLINICAL SUPPORT (OUTPATIENT)
Dept: CARDIOLOGY CLINIC | Age: 70
End: 2025-05-28

## 2025-05-28 DIAGNOSIS — I44.2 COMPLETE HEART BLOCK (HCC): ICD-10-CM

## 2025-05-28 DIAGNOSIS — I48.0 PAROXYSMAL ATRIAL FIBRILLATION (HCC): ICD-10-CM

## 2025-05-28 DIAGNOSIS — Z95.0 PACEMAKER: Primary | ICD-10-CM

## 2025-05-28 NOTE — TELEPHONE ENCOUNTER
Medication:   Requested Prescriptions     Pending Prescriptions Disp Refills    valsartan-hydroCHLOROthiazide (DIOVAN-HCT) 320-25 MG per tablet [Pharmacy Med Name: VALSARTAN-HCTZ 320-25 MG TAB] 90 tablet 1     Sig: TAKE 1 TABLET BY MOUTH EVERY DAY        Last Filled:      Patient Phone Number: [phone] (home) [phone] (work)    Last appt: 2/3/2025   Next appt: Visit date not found    Last OARRS:       7/28/2015     8:23 AM   RX Monitoring   Periodic Controlled Substance Monitoring No signs of potential drug abuse or diversion identified.

## 2025-05-29 DIAGNOSIS — E11.65 TYPE 2 DIABETES MELLITUS WITH HYPERGLYCEMIA, WITHOUT LONG-TERM CURRENT USE OF INSULIN (HCC): Chronic | ICD-10-CM

## 2025-05-29 RX ORDER — VALSARTAN AND HYDROCHLOROTHIAZIDE 320; 25 MG/1; MG/1
1 TABLET, FILM COATED ORAL DAILY
Qty: 90 TABLET | Refills: 1 | Status: SHIPPED | OUTPATIENT
Start: 2025-05-29

## 2025-05-29 RX ORDER — GLIMEPIRIDE 4 MG/1
4 TABLET ORAL EVERY MORNING
Qty: 90 TABLET | Refills: 2 | Status: SHIPPED | OUTPATIENT
Start: 2025-05-29

## 2025-05-29 NOTE — TELEPHONE ENCOUNTER
Medication:   Requested Prescriptions     Pending Prescriptions Disp Refills    glimepiride (AMARYL) 4 MG tablet [Pharmacy Med Name: GLIMEPIRIDE 4MG TABLETS] 90 tablet 2     Sig: TAKE 1 TABLET BY MOUTH EVERY MORNING        Last Filled:      Patient Phone Number: [phone] (home) [phone] (work)    Last appt: 2/3/2025   Next appt: Visit date not found    Last OARRS:       7/28/2015     8:23 AM   RX Monitoring   Periodic Controlled Substance Monitoring No signs of potential drug abuse or diversion identified.

## 2025-05-30 DIAGNOSIS — K21.9 GASTROESOPHAGEAL REFLUX DISEASE WITHOUT ESOPHAGITIS: ICD-10-CM

## 2025-05-30 RX ORDER — PANTOPRAZOLE SODIUM 40 MG/1
40 TABLET, DELAYED RELEASE ORAL DAILY
Qty: 90 TABLET | Refills: 2 | Status: SHIPPED | OUTPATIENT
Start: 2025-05-30

## 2025-05-30 NOTE — TELEPHONE ENCOUNTER
Patient requested refill for pantoprazole (PROTONIX) 40 MG tablet please send to [facility] pharmacy on [address]

## 2025-05-30 NOTE — TELEPHONE ENCOUNTER
Medication:   Requested Prescriptions     Pending Prescriptions Disp Refills    pantoprazole (PROTONIX) 40 MG tablet 90 tablet 2     Sig: Take 1 tablet by mouth daily        Last Filled:      Patient Phone Number: [phone] (home) [phone] (work)    Last appt: 2/3/2025   Next appt: Visit date not found    Last OARRS:       7/28/2015     8:23 AM   RX Monitoring   Periodic Controlled Substance Monitoring No signs of potential drug abuse or diversion identified.

## 2025-06-03 ENCOUNTER — TELEPHONE (OUTPATIENT)
Dept: CARDIOLOGY CLINIC | Age: 70
End: 2025-06-03

## 2025-06-03 RX ORDER — APIXABAN 5 MG/1
5 TABLET, FILM COATED ORAL 2 TIMES DAILY
Qty: 60 TABLET | Refills: 0 | Status: SHIPPED | OUTPATIENT
Start: 2025-06-03

## 2025-06-03 NOTE — TELEPHONE ENCOUNTER
Letter pended. Spoke with HEMAL Matt who will print letter and obtain signature from [facility].

## 2025-06-03 NOTE — TELEPHONE ENCOUNTER
Trino called to see if she could get a return to work note for when she had her pacemaker changed on 5/19? She states she was out for 5 days, until 5/23 and returned to work on 5/26.     Please assist.     Trino would like to  letter in office    Callback: [phone]

## 2025-06-09 DIAGNOSIS — E11.69 OBESITY, DIABETES, AND HYPERTENSION SYNDROME (HCC): ICD-10-CM

## 2025-06-09 DIAGNOSIS — E66.9 OBESITY, DIABETES, AND HYPERTENSION SYNDROME (HCC): ICD-10-CM

## 2025-06-09 DIAGNOSIS — E11.65 TYPE 2 DIABETES MELLITUS WITH HYPERGLYCEMIA, WITHOUT LONG-TERM CURRENT USE OF INSULIN (HCC): Chronic | ICD-10-CM

## 2025-06-09 DIAGNOSIS — E11.59 OBESITY, DIABETES, AND HYPERTENSION SYNDROME (HCC): ICD-10-CM

## 2025-06-09 DIAGNOSIS — I15.2 OBESITY, DIABETES, AND HYPERTENSION SYNDROME (HCC): ICD-10-CM

## 2025-06-09 RX ORDER — METFORMIN HYDROCHLORIDE 500 MG/1
1000 TABLET, EXTENDED RELEASE ORAL 2 TIMES DAILY
Qty: 360 TABLET | Refills: 2 | Status: SHIPPED | OUTPATIENT
Start: 2025-06-09

## 2025-06-09 RX ORDER — AMLODIPINE BESYLATE 5 MG/1
5 TABLET ORAL DAILY
Qty: 90 TABLET | Refills: 2 | Status: SHIPPED | OUTPATIENT
Start: 2025-06-09

## 2025-06-09 NOTE — TELEPHONE ENCOUNTER
Medication:   Requested Prescriptions     Pending Prescriptions Disp Refills    amLODIPine (NORVASC) 5 MG tablet [Pharmacy Med Name: AMLODIPINE BESYLATE 5MG TABLETS] 90 tablet 2     Sig: TAKE 1 TABLET BY MOUTH EVERY DAY    metFORMIN (GLUCOPHAGE-XR) 500 MG extended release tablet [Pharmacy Med Name: METFORMIN ER 500MG 24HR TABS] 360 tablet 2     Sig: TAKE 2 TABLETS BY MOUTH TWICE DAILY        Last Filled:      Patient Phone Number: [phone] (home) [phone] (work)    Last appt: 2/3/2025   Next appt: Visit date not found    Last OARRS:       7/28/2015     8:23 AM   RX Monitoring   Periodic Controlled Substance Monitoring No signs of potential drug abuse or diversion identified.

## 2025-07-05 ENCOUNTER — HOSPITAL ENCOUNTER (EMERGENCY)
Age: 70
Discharge: HOME | End: 2025-07-05
Attending: EMERGENCY MEDICINE
Payer: MEDICARE

## 2025-07-05 ENCOUNTER — APPOINTMENT (OUTPATIENT)
Dept: CT IMAGING | Age: 70
End: 2025-07-05
Payer: MEDICARE

## 2025-07-05 VITALS
BODY MASS INDEX: 38.65 KG/M2 | TEMPERATURE: 99.8 F | DIASTOLIC BLOOD PRESSURE: 66 MMHG | WEIGHT: 226.41 LBS | OXYGEN SATURATION: 96 % | HEART RATE: 101 BPM | SYSTOLIC BLOOD PRESSURE: 150 MMHG | RESPIRATION RATE: 20 BRPM | HEIGHT: 64 IN

## 2025-07-05 DIAGNOSIS — H93.11 TINNITUS OF RIGHT EAR: ICD-10-CM

## 2025-07-05 DIAGNOSIS — N30.01 ACUTE CYSTITIS WITH HEMATURIA: Primary | ICD-10-CM

## 2025-07-05 LAB
AMORPH SED URNS QL MICRO: ABNORMAL /HPF
BACTERIA URNS QL MICRO: ABNORMAL /HPF
BILIRUB UR QL STRIP.AUTO: NEGATIVE
CLARITY UR: ABNORMAL
COLOR UR: YELLOW
EPI CELLS #/AREA URNS HPF: ABNORMAL /HPF (ref 0–5)
GLUCOSE UR STRIP.AUTO-MCNC: NEGATIVE MG/DL
HGB UR QL STRIP.AUTO: ABNORMAL
KETONES UR STRIP.AUTO-MCNC: NEGATIVE MG/DL
LEUKOCYTE ESTERASE UR QL STRIP.AUTO: ABNORMAL
NITRITE UR QL STRIP.AUTO: NEGATIVE
PH UR STRIP.AUTO: 5.5 [PH] (ref 5–8)
PROT UR STRIP.AUTO-MCNC: 30 MG/DL
RBC #/AREA URNS HPF: >100 /HPF (ref 0–4)
SP GR UR STRIP.AUTO: 1.01 (ref 1–1.03)
UA COMPLETE W REFLEX CULTURE PNL UR: YES
UA DIPSTICK W REFLEX MICRO PNL UR: YES
URN SPEC COLLECT METH UR: ABNORMAL
UROBILINOGEN UR STRIP-ACNC: 0.2 E.U./DL
WBC #/AREA URNS HPF: >100 /HPF (ref 0–5)

## 2025-07-05 PROCEDURE — 70450 CT HEAD/BRAIN W/O DYE: CPT

## 2025-07-05 PROCEDURE — 99284 EMERGENCY DEPT VISIT MOD MDM: CPT

## 2025-07-05 PROCEDURE — 81001 URINALYSIS AUTO W/SCOPE: CPT

## 2025-07-05 PROCEDURE — 87077 CULTURE AEROBIC IDENTIFY: CPT

## 2025-07-05 PROCEDURE — 6370000000 HC RX 637 (ALT 250 FOR IP): Performed by: EMERGENCY MEDICINE

## 2025-07-05 PROCEDURE — 87086 URINE CULTURE/COLONY COUNT: CPT

## 2025-07-05 PROCEDURE — 87186 SC STD MICRODIL/AGAR DIL: CPT

## 2025-07-05 RX ORDER — NITROFURANTOIN 25; 75 MG/1; MG/1
100 CAPSULE ORAL ONCE
Status: COMPLETED | OUTPATIENT
Start: 2025-07-05 | End: 2025-07-05

## 2025-07-05 RX ORDER — NITROFURANTOIN 25; 75 MG/1; MG/1
100 CAPSULE ORAL 2 TIMES DAILY
Qty: 14 CAPSULE | Refills: 0 | Status: SHIPPED | OUTPATIENT
Start: 2025-07-05 | End: 2025-07-12

## 2025-07-05 RX ADMIN — NITROFURANTOIN (MONOHYDRATE/MACROCRYSTALS) 100 MG: 25; 75 CAPSULE ORAL at 22:22

## 2025-07-06 NOTE — ED PROVIDER NOTES
[facility] EMERGENCY DEPARTMENT  eMERGENCY dEPARTMENT eNCOUnter      Pt Name: Trino Weller  MRN: [id number]  Birthdate 1955  Date of evaluation: 7/5/2025  Provider: Jatinder Kee MD  PCP: Sara Rosario DO      CHIEF COMPLAINT       Chief Complaint   Patient presents with    Urinary Frequency     Pt c/o frequent urination x 2 days, dark urine in am, pt is diabetic    Tinnitus     Pt c/o ringing in right ear that started yesterday    Eye Problem     Pt states yesterday when she would look up at the monica it looked purple instead of blue.       HISTORY OFPRESENT ILLNESS   (Location/Symptom, Timing/Onset, Context/Setting, Quality, Duration, Modifying Factors,Severity)  Note limiting factors.     Trino Weller is a 69 y.o. female presents with complaints of 2 days of frequent urination dark urine in the morning she is a diabetic also complained of ringing in her right ear that started yesterday and has some associated purple spots in her right eye this has stopped    Nursing Notes were all reviewed and agreed with or any disagreements were addressed  in the HPI.    REVIEW OF SYSTEMS    (2-9 systems for level 4, 10 or more for level 5)     Review of Systems    Positives and Pertinent negatives as per HPI.  Except as noted above in the ROS, all other systems were reviewed andnegative.       PASTMEDICAL HISTORY     Past Medical History:   Diagnosis Date    Complete heart block (HCC) 4/23/2021    Diabetes mellitus (HCC)     GERD (gastroesophageal reflux disease)     Hyperlipidemia     Hypertension     Obesity     S/P hip replacement     Sleep apnea          SURGICAL HISTORY       Past Surgical History:   Procedure Laterality Date    COLONOSCOPY  9/28/2021    COLONOSCOPY POLYPECTOMY SNARE/COLD BIOPSY performed by Shanelle Byrd MD at [facility] ENDOSCOPY    EP DEVICE PROCEDURE N/A 5/19/2025    Biventricular pacemaker upgrade performed by Cameron Bui MD at [facility] CARDIAC CATH LAB    JOINT

## 2025-07-06 NOTE — DISCHARGE INSTR - COC
Continuity of Care Form    Patient Name: Trino Weller   :  1955  MRN:  [id number]    Admit date:  2025  Discharge date:  ***    Code Status Order: Prior   Advance Directives:     Admitting Physician:  No admitting provider for patient encounter.  PCP: Sara Rosario DO    Discharging Nurse: ***  Discharging Hospital Unit/Room#: RW-[facility]AL[facility]/NONE  Discharging Unit Phone Number: ***    Emergency Contact:   Extended Emergency Contact Information  Primary Emergency Contact: PashaKomal   [facility]  Home Phone: [phone]  Mobile Phone: [phone]  Relation: Child  Secondary Emergency Contact: NATHANIEL BLAND  Home Phone: [phone]  Mobile Phone: [phone]  Relation: Child    Past Surgical History:  Past Surgical History:   Procedure Laterality Date    COLONOSCOPY  2021    COLONOSCOPY POLYPECTOMY SNARE/COLD BIOPSY performed by Shanelle Byrd MD at [facility] ENDOSCOPY    EP DEVICE PROCEDURE N/A 2025    Biventricular pacemaker upgrade performed by Cameron Bui MD at [facility] CARDIAC CATH LAB    JOINT REPLACEMENT      TOTAL HIP ARTHROPLASTY Left Oct '13    UPPER GASTROINTESTINAL ENDOSCOPY N/A 2021    EGD BIOPSY performed by Shanelle Byrd MD at [facility] ENDOSCOPY    UPPER GASTROINTESTINAL ENDOSCOPY N/A 10/13/2021    ESOPHAGOGASTRODUODENOSCOPY RADIOFREQUENCY ABLATION performed by Shanelle Byrd MD at [facility] ENDOSCOPY    UPPER GASTROINTESTINAL ENDOSCOPY N/A 10/13/2021    EGD BIOPSY performed by Shanelle Byrd MD at [facility] ENDOSCOPY       Immunization History:   Immunization History   Administered Date(s) Administered    COVID-19, PFIZER PURPLE top, DILUTE for use, (age 12 y+), 30mcg/0.3mL 2021, 2021    Influenza Vaccine, unspecified formulation 10/28/2016    Influenza Virus Vaccine 10/01/2021, 2022    Influenza, FLUAD, (age 65 y+), IM, Quadv, 0.5mL 2023    Influenza, FLUARIX, FLULAVAL, FLUZONE (age 6 mo+) and AFLURIA, (age 3 y+), Quadv PF, 0.5mL 10/08/2019

## 2025-07-08 ENCOUNTER — RESULTS FOLLOW-UP (OUTPATIENT)
Dept: EMERGENCY DEPT | Age: 70
End: 2025-07-08

## 2025-07-08 LAB
BACTERIA UR CULT: ABNORMAL
ORGANISM: ABNORMAL

## 2025-07-12 DIAGNOSIS — I15.2 OBESITY, DIABETES, AND HYPERTENSION SYNDROME (HCC): ICD-10-CM

## 2025-07-12 DIAGNOSIS — E11.59 OBESITY, DIABETES, AND HYPERTENSION SYNDROME (HCC): ICD-10-CM

## 2025-07-12 DIAGNOSIS — E66.9 OBESITY, DIABETES, AND HYPERTENSION SYNDROME (HCC): ICD-10-CM

## 2025-07-12 DIAGNOSIS — E11.69 OBESITY, DIABETES, AND HYPERTENSION SYNDROME (HCC): ICD-10-CM

## 2025-07-14 RX ORDER — APIXABAN 5 MG/1
5 TABLET, FILM COATED ORAL 2 TIMES DAILY
Qty: 180 TABLET | Refills: 3 | Status: SHIPPED | OUTPATIENT
Start: 2025-07-14

## 2025-07-14 RX ORDER — CLONIDINE HYDROCHLORIDE 0.2 MG/1
0.2 TABLET ORAL 2 TIMES DAILY
Qty: 180 TABLET | Refills: 2 | Status: SHIPPED | OUTPATIENT
Start: 2025-07-14

## 2025-07-14 NOTE — TELEPHONE ENCOUNTER
Received refill request for   ELIQUIS 5 MG   from [facility]  pharmacy.     Last OV:5/8/2025  [facility]    Next OV: 9/2/025 [facility]    Last Labs: 5/14/2025 CBC

## 2025-07-14 NOTE — TELEPHONE ENCOUNTER
Medication:   Requested Prescriptions     Pending Prescriptions Disp Refills    cloNIDine (CATAPRES) 0.2 MG tablet [Pharmacy Med Name: CLONIDINE 0.2MG TABLETS] 180 tablet 2     Sig: TAKE 1 TABLET BY MOUTH TWICE DAILY        Last Filled:      Patient Phone Number: [phone] (home) [phone] (work)    Last appt: 2/3/2025   Next appt: Visit date not found    Last OARRS:       7/28/2015     8:23 AM   RX Monitoring   Periodic Controlled Substance Monitoring No signs of potential drug abuse or diversion identified.

## 2025-07-16 ENCOUNTER — OFFICE VISIT (OUTPATIENT)
Dept: PRIMARY CARE CLINIC | Age: 70
End: 2025-07-16
Payer: MEDICARE

## 2025-07-16 VITALS
HEIGHT: 64 IN | OXYGEN SATURATION: 99 % | HEART RATE: 87 BPM | WEIGHT: 222 LBS | TEMPERATURE: 97.2 F | SYSTOLIC BLOOD PRESSURE: 122 MMHG | BODY MASS INDEX: 37.9 KG/M2 | DIASTOLIC BLOOD PRESSURE: 78 MMHG

## 2025-07-16 DIAGNOSIS — E11.65 TYPE 2 DIABETES MELLITUS WITH HYPERGLYCEMIA, WITHOUT LONG-TERM CURRENT USE OF INSULIN (HCC): Chronic | ICD-10-CM

## 2025-07-16 DIAGNOSIS — N39.0 ACUTE UTI: Primary | ICD-10-CM

## 2025-07-16 DIAGNOSIS — I50.20 HFREF (HEART FAILURE WITH REDUCED EJECTION FRACTION) (HCC): ICD-10-CM

## 2025-07-16 DIAGNOSIS — H93.11 TINNITUS, RIGHT EAR: ICD-10-CM

## 2025-07-16 PROCEDURE — 2022F DILAT RTA XM EVC RTNOPTHY: CPT | Performed by: FAMILY MEDICINE

## 2025-07-16 PROCEDURE — G8417 CALC BMI ABV UP PARAM F/U: HCPCS | Performed by: FAMILY MEDICINE

## 2025-07-16 PROCEDURE — G8427 DOCREV CUR MEDS BY ELIG CLIN: HCPCS | Performed by: FAMILY MEDICINE

## 2025-07-16 PROCEDURE — 1123F ACP DISCUSS/DSCN MKR DOCD: CPT | Performed by: FAMILY MEDICINE

## 2025-07-16 PROCEDURE — 3074F SYST BP LT 130 MM HG: CPT | Performed by: FAMILY MEDICINE

## 2025-07-16 PROCEDURE — 1159F MED LIST DOCD IN RCRD: CPT | Performed by: FAMILY MEDICINE

## 2025-07-16 PROCEDURE — 3078F DIAST BP <80 MM HG: CPT | Performed by: FAMILY MEDICINE

## 2025-07-16 PROCEDURE — 1090F PRES/ABSN URINE INCON ASSESS: CPT | Performed by: FAMILY MEDICINE

## 2025-07-16 PROCEDURE — 3044F HG A1C LEVEL LT 7.0%: CPT | Performed by: FAMILY MEDICINE

## 2025-07-16 PROCEDURE — 1036F TOBACCO NON-USER: CPT | Performed by: FAMILY MEDICINE

## 2025-07-16 PROCEDURE — 99214 OFFICE O/P EST MOD 30 MIN: CPT | Performed by: FAMILY MEDICINE

## 2025-07-16 PROCEDURE — G8399 PT W/DXA RESULTS DOCUMENT: HCPCS | Performed by: FAMILY MEDICINE

## 2025-07-16 PROCEDURE — 3017F COLORECTAL CA SCREEN DOC REV: CPT | Performed by: FAMILY MEDICINE

## 2025-07-16 SDOH — ECONOMIC STABILITY: FOOD INSECURITY: WITHIN THE PAST 12 MONTHS, YOU WORRIED THAT YOUR FOOD WOULD RUN OUT BEFORE YOU GOT MONEY TO BUY MORE.: NEVER TRUE

## 2025-07-16 SDOH — ECONOMIC STABILITY: FOOD INSECURITY: WITHIN THE PAST 12 MONTHS, THE FOOD YOU BOUGHT JUST DIDN'T LAST AND YOU DIDN'T HAVE MONEY TO GET MORE.: NEVER TRUE

## 2025-07-16 ASSESSMENT — ENCOUNTER SYMPTOMS
ABDOMINAL PAIN: 0
SHORTNESS OF BREATH: 0
DIARRHEA: 0
CONSTIPATION: 0
BLOOD IN STOOL: 0

## 2025-07-16 NOTE — PROGRESS NOTES
2025     Trino Weller (:  1955) is a 69 y.o. female, here for evaluation of the following medical concerns:    HPI  Patient is 69 years old female, [place] , medical history significant for diabetes, hypertension, hyperlipidemia, was seen at [facility] emergency room last 2025 due to urinary frequency and tinnitus.  She was found to have acute cystitis with hematuria as well as tinnitus.  She was discharged home on Macrobid and meclizine.  She presented to the office today for follow-up.  Tinnitus is gone.  She has no urinary symptoms and she has done with the antibiotics.  She wants a note for work.  She reported that she been OFF work  from  until  and returned  to work  but needs a note for work    Review of Systems   Constitutional:  Negative for activity change and appetite change.   Eyes:  Negative for visual disturbance.   Respiratory:  Negative for shortness of breath.    Cardiovascular:  Negative for chest pain and leg swelling.   Gastrointestinal:  Negative for abdominal pain, blood in stool, constipation and diarrhea.   Genitourinary:  Negative for difficulty urinating, frequency, hematuria, menstrual problem and urgency.   Neurological:  Negative for dizziness and syncope.   Psychiatric/Behavioral:  Negative for behavioral problems.        Prior to Visit Medications    Medication Sig Taking? Authorizing Provider   cloNIDine (CATAPRES) 0.2 MG tablet TAKE 1 TABLET BY MOUTH TWICE DAILY Yes Sara Rosario DO   apixaban (ELIQUIS) 5 MG TABS tablet TAKE 1 TABLET BY MOUTH TWICE DAILY Yes Cameron Bui MD   amLODIPine (NORVASC) 5 MG tablet TAKE 1 TABLET BY MOUTH EVERY DAY Yes Sara Rosario DO   metFORMIN (GLUCOPHAGE-XR) 500 MG extended release tablet TAKE 2 TABLETS BY MOUTH TWICE DAILY Yes Sara Rosario DO   pantoprazole (PROTONIX) 40 MG tablet Take 1 tablet by mouth daily Yes Sara Rosario DO   valsartan-hydroCHLOROthiazide

## 2025-08-25 DIAGNOSIS — K21.9 GASTROESOPHAGEAL REFLUX DISEASE WITHOUT ESOPHAGITIS: ICD-10-CM

## 2025-08-26 RX ORDER — PANTOPRAZOLE SODIUM 40 MG/1
40 TABLET, DELAYED RELEASE ORAL DAILY
Qty: 90 TABLET | Refills: 2 | Status: SHIPPED | OUTPATIENT
Start: 2025-08-26

## 2025-08-28 DIAGNOSIS — E11.69 TYPE 2 DIABETES MELLITUS WITH HYPERLIPIDEMIA (HCC): ICD-10-CM

## 2025-08-28 DIAGNOSIS — E78.5 TYPE 2 DIABETES MELLITUS WITH HYPERLIPIDEMIA (HCC): ICD-10-CM

## 2025-08-28 RX ORDER — ATORVASTATIN CALCIUM 20 MG/1
20 TABLET, FILM COATED ORAL NIGHTLY
Qty: 90 TABLET | Refills: 2 | Status: SHIPPED | OUTPATIENT
Start: 2025-08-28

## 2025-09-02 ENCOUNTER — CLINICAL SUPPORT (OUTPATIENT)
Dept: CARDIOLOGY CLINIC | Age: 70
End: 2025-09-02

## 2025-09-02 ENCOUNTER — OFFICE VISIT (OUTPATIENT)
Dept: CARDIOLOGY CLINIC | Age: 70
End: 2025-09-02
Payer: MEDICARE

## 2025-09-02 VITALS
OXYGEN SATURATION: 95 % | HEIGHT: 64 IN | DIASTOLIC BLOOD PRESSURE: 70 MMHG | BODY MASS INDEX: 38.24 KG/M2 | WEIGHT: 224 LBS | HEART RATE: 80 BPM | SYSTOLIC BLOOD PRESSURE: 124 MMHG

## 2025-09-02 DIAGNOSIS — Z95.0 PACEMAKER: Primary | ICD-10-CM

## 2025-09-02 DIAGNOSIS — I48.0 PAROXYSMAL ATRIAL FIBRILLATION (HCC): Primary | ICD-10-CM

## 2025-09-02 DIAGNOSIS — Z95.0 PACEMAKER: ICD-10-CM

## 2025-09-02 DIAGNOSIS — I42.9 CARDIOMYOPATHY, UNSPECIFIED TYPE (HCC): ICD-10-CM

## 2025-09-02 DIAGNOSIS — I44.2 CHB (COMPLETE HEART BLOCK) (HCC): ICD-10-CM

## 2025-09-02 DIAGNOSIS — I44.2 COMPLETE HEART BLOCK (HCC): Chronic | ICD-10-CM

## 2025-09-02 PROCEDURE — 1123F ACP DISCUSS/DSCN MKR DOCD: CPT | Performed by: INTERNAL MEDICINE

## 2025-09-02 PROCEDURE — 1090F PRES/ABSN URINE INCON ASSESS: CPT | Performed by: INTERNAL MEDICINE

## 2025-09-02 PROCEDURE — G8399 PT W/DXA RESULTS DOCUMENT: HCPCS | Performed by: INTERNAL MEDICINE

## 2025-09-02 PROCEDURE — 93000 ELECTROCARDIOGRAM COMPLETE: CPT | Performed by: INTERNAL MEDICINE

## 2025-09-02 PROCEDURE — 3078F DIAST BP <80 MM HG: CPT | Performed by: INTERNAL MEDICINE

## 2025-09-02 PROCEDURE — G8427 DOCREV CUR MEDS BY ELIG CLIN: HCPCS | Performed by: INTERNAL MEDICINE

## 2025-09-02 PROCEDURE — 3074F SYST BP LT 130 MM HG: CPT | Performed by: INTERNAL MEDICINE

## 2025-09-02 PROCEDURE — 99214 OFFICE O/P EST MOD 30 MIN: CPT | Performed by: INTERNAL MEDICINE

## 2025-09-02 PROCEDURE — 1036F TOBACCO NON-USER: CPT | Performed by: INTERNAL MEDICINE

## 2025-09-02 PROCEDURE — G8417 CALC BMI ABV UP PARAM F/U: HCPCS | Performed by: INTERNAL MEDICINE

## 2025-09-02 PROCEDURE — 3017F COLORECTAL CA SCREEN DOC REV: CPT | Performed by: INTERNAL MEDICINE

## 2025-09-02 PROCEDURE — 1159F MED LIST DOCD IN RCRD: CPT | Performed by: INTERNAL MEDICINE

## 2025-09-02 PROCEDURE — G2211 COMPLEX E/M VISIT ADD ON: HCPCS | Performed by: INTERNAL MEDICINE

## (undated) DEVICE — INTRODUCER SHTH L13CM OD7FR SH ORNG HUB SEAMLESS SAFSHTH

## (undated) DEVICE — CATHETER ENDOSCP L135CM W7.5XL15.7MM DIA2.8MM FLX RFA

## (undated) DEVICE — FORCEPS BX L240CM JAW DIA2.4MM ORNG L CAP W/ NDL DISP RAD

## (undated) DEVICE — TRAP SPEC RETRV CLR PLAS POLYP IN LN SUCT QUIK CTCH

## (undated) DEVICE — INTRODUCER SHTH 0.018 IN 4 FRX40 CM KT SFT TIP NIT SS VSI

## (undated) DEVICE — MASK CAPNOGRAPHY AD W35IN DIA58IN SAMP LN L10FT O2 LN

## (undated) DEVICE — FORCEPS BX L240CM JAW DIA2.8MM L CAP W/ NDL MIC MESH TOOTH

## (undated) DEVICE — CATHETER GUID OD7FR ID5.4FR L40CM C315

## (undated) DEVICE — SNARE COLD DIAMOND 10MM THIN

## (undated) DEVICE — RADIFOCUS GLIDEWIRE: Brand: GLIDEWIRE

## (undated) DEVICE — PLASMABLADE PS200-040 4.0: Brand: PLASMABLADE™

## (undated) DEVICE — SLITTER LD GUID ADJ DISP